# Patient Record
Sex: FEMALE | Race: WHITE | Employment: OTHER | URBAN - METROPOLITAN AREA
[De-identification: names, ages, dates, MRNs, and addresses within clinical notes are randomized per-mention and may not be internally consistent; named-entity substitution may affect disease eponyms.]

---

## 2017-11-30 PROBLEM — M17.11 PRIMARY OSTEOARTHRITIS OF RIGHT KNEE: Status: ACTIVE | Noted: 2017-11-30

## 2019-06-24 ENCOUNTER — OFFICE VISIT (OUTPATIENT)
Dept: OTOLARYNGOLOGY | Facility: CLINIC | Age: 59
End: 2019-06-24
Payer: COMMERCIAL

## 2019-06-24 VITALS
RESPIRATION RATE: 20 BRPM | HEART RATE: 86 BPM | DIASTOLIC BLOOD PRESSURE: 76 MMHG | TEMPERATURE: 97 F | SYSTOLIC BLOOD PRESSURE: 118 MMHG

## 2019-06-24 DIAGNOSIS — R51.9 FACIAL PAIN: ICD-10-CM

## 2019-06-24 DIAGNOSIS — H92.02 REFERRED OTALGIA OF LEFT EAR: Primary | ICD-10-CM

## 2019-06-24 PROCEDURE — 99244 OFF/OP CNSLTJ NEW/EST MOD 40: CPT | Performed by: OTOLARYNGOLOGY

## 2019-06-24 PROCEDURE — 99212 OFFICE O/P EST SF 10 MIN: CPT | Performed by: OTOLARYNGOLOGY

## 2019-06-24 NOTE — PROGRESS NOTES
Tanja Khoury is a 60year old female. Patient presents with:  Ear Pain: Patient present with left ear pain x 2 weeks      HISTORY OF PRESENT ILLNESS  6/24/2019  Patient   with sharp shooting pain in the left ear.   This occurs when she eats cold ice crea member of club or organization: Not on file        Attends meetings of clubs or organizations: Not on file        Relationship status: Not on file      Intimate partner violence:        Fear of current or ex partner: Not on file        Emotionally abused: Exam Normal  normal to inspection   Psychiatric Normal   Appropriate mood and affect.    Lymph Detail Normal  no lymphadenopathy  Minimal neck tension and range of motion    Eyes Normal Conjunctiva - Right: Normal, Left: Normal. Pupil - Right: Normal, Left: tablet, Rfl: 0    ASSESSMENT AND PLAN  1. Referred otalgia of left ear  I believe she has odontogenic pain ordered a CAT scan of the sinuses per her request  - CT SINUS (CPT=70486); Future    2.  Facial pain       Ella Gr MD    6/24/2019    3:17 PM

## 2019-06-30 ENCOUNTER — HOSPITAL ENCOUNTER (OUTPATIENT)
Dept: CT IMAGING | Facility: HOSPITAL | Age: 59
Discharge: HOME | End: 2019-06-30
Attending: OTOLARYNGOLOGY
Payer: COMMERCIAL

## 2019-06-30 DIAGNOSIS — H92.02 REFERRED OTALGIA OF LEFT EAR: ICD-10-CM

## 2019-06-30 PROCEDURE — 70486 CT MAXILLOFACIAL W/O DYE: CPT | Performed by: OTOLARYNGOLOGY

## 2020-02-05 ENCOUNTER — OFFICE VISIT (OUTPATIENT)
Dept: NEUROLOGY | Facility: CLINIC | Age: 60
End: 2020-02-05
Payer: COMMERCIAL

## 2020-02-05 ENCOUNTER — TELEPHONE (OUTPATIENT)
Dept: NEUROLOGY | Facility: CLINIC | Age: 60
End: 2020-02-05

## 2020-02-05 VITALS
SYSTOLIC BLOOD PRESSURE: 126 MMHG | WEIGHT: 205 LBS | HEIGHT: 61 IN | DIASTOLIC BLOOD PRESSURE: 74 MMHG | BODY MASS INDEX: 38.71 KG/M2

## 2020-02-05 DIAGNOSIS — R20.2 PARESTHESIA: ICD-10-CM

## 2020-02-05 DIAGNOSIS — G43.009 MIGRAINE WITHOUT AURA AND WITHOUT STATUS MIGRAINOSUS, NOT INTRACTABLE: ICD-10-CM

## 2020-02-05 DIAGNOSIS — R27.0 ATAXIA: Primary | ICD-10-CM

## 2020-02-05 PROCEDURE — 99204 OFFICE O/P NEW MOD 45 MIN: CPT | Performed by: OTHER

## 2020-02-05 RX ORDER — GABAPENTIN 100 MG/1
CAPSULE ORAL
Qty: 90 CAPSULE | Refills: 3 | Status: SHIPPED | OUTPATIENT
Start: 2020-02-05 | End: 2020-03-04

## 2020-02-05 NOTE — PROGRESS NOTES
Neurology Initial Visit     Referred By: Dr. Gutiérrez ref. provider found    Chief Complaint: Patient presents with:  Neuropathy: Patient presents today c/o numbness in bilateral legs, started a couple years ago.  She states that it goes from right thigh down t that showed some degenerative changes but relatively mild in nature. She was taking tramadol, diclofenac and some other pain medications on a regular basis.     For migraines she was most recently prescribed ajovy, and with that and Maxalt she was able to •  TraMADol HCl (ULTRAM) 50 MG Oral Tab, Take 1 tablet (50 mg total) by mouth every 6 (six) hours as needed for Pain., Disp: 50 tablet, Rfl: 0  •  Triamterene-HCTZ 37.5-25 MG Oral Cap, Take 1 capsule by mouth as needed.   , Disp: , Rfl: 0    No Known Alexandro intact    Gait:  Limping right side gait, possibly antalgic    Labs:    No results found for: TSH  No results found for: HDL, LDL, TRIG  No results found for: HGB, HCT, MCV, WBC, ALC, PLT   No results found for: GLUCOSE, BUN, CREAT, CA, ALT, AST, ALKPHOS, given. All questions were answered. All side effects of drugs were discussed. Return to clinic in: Return in about 4 weeks (around 3/4/2020).     Keyona Salas MD

## 2020-02-05 NOTE — TELEPHONE ENCOUNTER
Called [facility]  for authorization of approval for MRI SPINE THORACIC CPT Code 64438,   MRI SPINE CERVICAL CPT Code 82460, and MRI BRAIN CPT Code 37966-Drqed to Faith Barahona. who states authorization is required from [facility]. Call is transferred to [facility].    Reference # 1-

## 2020-02-06 ENCOUNTER — MED REC SCAN ONLY (OUTPATIENT)
Dept: NEUROLOGY | Facility: CLINIC | Age: 60
End: 2020-02-06

## 2020-02-06 ENCOUNTER — LAB ENCOUNTER (OUTPATIENT)
Dept: LAB | Facility: HOSPITAL | Age: 60
End: 2020-02-06
Attending: Other
Payer: COMMERCIAL

## 2020-02-06 DIAGNOSIS — R20.2 PARESTHESIA: ICD-10-CM

## 2020-02-06 LAB
BASOPHILS # BLD AUTO: 0.07 X10(3) UL (ref 0–0.2)
BASOPHILS NFR BLD AUTO: 1.1 %
DEPRECATED RDW RBC AUTO: 43.7 FL (ref 35.1–46.3)
EOSINOPHIL # BLD AUTO: 0.18 X10(3) UL (ref 0–0.7)
EOSINOPHIL NFR BLD AUTO: 2.7 %
ERYTHROCYTE [DISTWIDTH] IN BLOOD BY AUTOMATED COUNT: 13.4 % (ref 11–15)
HCT VFR BLD AUTO: 39.8 % (ref 35–48)
HCYS SERPL-SCNC: 11.1 UMOL/L (ref 3.2–10.7)
HGB BLD-MCNC: 12.7 G/DL (ref 12–16)
IGA SERPL-MCNC: 132 MG/DL (ref 70–312)
IMM GRANULOCYTES # BLD AUTO: 0.02 X10(3) UL (ref 0–1)
IMM GRANULOCYTES NFR BLD: 0.3 %
LYMPHOCYTES # BLD AUTO: 1.82 X10(3) UL (ref 1–4)
LYMPHOCYTES NFR BLD AUTO: 27.5 %
MCH RBC QN AUTO: 28.1 PG (ref 26–34)
MCHC RBC AUTO-ENTMCNC: 31.9 G/DL (ref 31–37)
MCV RBC AUTO: 88.1 FL (ref 80–100)
MONOCYTES # BLD AUTO: 0.66 X10(3) UL (ref 0.1–1)
MONOCYTES NFR BLD AUTO: 10 %
NEUTROPHILS # BLD AUTO: 3.88 X10 (3) UL (ref 1.5–7.7)
NEUTROPHILS # BLD AUTO: 3.88 X10(3) UL (ref 1.5–7.7)
NEUTROPHILS NFR BLD AUTO: 58.4 %
PLATELET # BLD AUTO: 259 10(3)UL (ref 150–450)
RBC # BLD AUTO: 4.52 X10(6)UL (ref 3.8–5.3)
RHEUMATOID FACT SERPL-ACNC: <10 IU/ML (ref ?–15)
VIT B12 SERPL-MCNC: 846 PG/ML (ref 193–986)
WBC # BLD AUTO: 6.6 X10(3) UL (ref 4–11)

## 2020-02-06 PROCEDURE — 82607 VITAMIN B-12: CPT | Performed by: OTHER

## 2020-02-06 PROCEDURE — 86431 RHEUMATOID FACTOR QUANT: CPT | Performed by: OTHER

## 2020-02-06 PROCEDURE — 86147 CARDIOLIPIN ANTIBODY EA IG: CPT | Performed by: OTHER

## 2020-02-06 PROCEDURE — 83516 IMMUNOASSAY NONANTIBODY: CPT

## 2020-02-06 PROCEDURE — 86039 ANTINUCLEAR ANTIBODIES (ANA): CPT

## 2020-02-06 PROCEDURE — 36415 COLL VENOUS BLD VENIPUNCTURE: CPT

## 2020-02-06 PROCEDURE — 82784 ASSAY IGA/IGD/IGG/IGM EACH: CPT

## 2020-02-06 PROCEDURE — 86255 FLUORESCENT ANTIBODY SCREEN: CPT

## 2020-02-06 PROCEDURE — 86617 LYME DISEASE ANTIBODY: CPT

## 2020-02-06 PROCEDURE — 85025 COMPLETE CBC W/AUTO DIFF WBC: CPT | Performed by: OTHER

## 2020-02-06 PROCEDURE — 86235 NUCLEAR ANTIGEN ANTIBODY: CPT

## 2020-02-06 PROCEDURE — 83876 ASSAY MYELOPEROXIDASE: CPT

## 2020-02-06 PROCEDURE — 86618 LYME DISEASE ANTIBODY: CPT

## 2020-02-06 PROCEDURE — 86225 DNA ANTIBODY NATIVE: CPT

## 2020-02-06 PROCEDURE — 86021 WBC ANTIBODY IDENTIFICATION: CPT

## 2020-02-06 PROCEDURE — 86256 FLUORESCENT ANTIBODY TITER: CPT

## 2020-02-06 PROCEDURE — 83090 ASSAY OF HOMOCYSTEINE: CPT

## 2020-02-06 PROCEDURE — 86038 ANTINUCLEAR ANTIBODIES: CPT

## 2020-02-06 PROCEDURE — 86334 IMMUNOFIX E-PHORESIS SERUM: CPT

## 2020-02-06 NOTE — TELEPHONE ENCOUNTER
Called  [facility] for status authorization for approval  for MRI SPINE THORACIC CPT Code 25042   and MRI SPINE CERVICAL CPT Code 68377. Spoke to clinical nurse Ameena Graf. Who states request above are authorized.    Authorization # [id number] Effective date: 02/05/2

## 2020-02-07 LAB
GLIADIN IGA SER-ACNC: 18 U/ML (ref ?–7)
GLIADIN IGG SER-ACNC: 0.5 U/ML (ref ?–7)
TTG IGA SER-ACNC: 0.2 U/ML (ref ?–7)

## 2020-02-08 LAB
CARDIOLIPIN ANTIBODY, IGA: 0 APL
MYELOPEROX ANTIBODIES, IGG: 0 AU/ML
SERINE PROTEASE3, IGG: 5 AU/ML

## 2020-02-10 ENCOUNTER — TELEPHONE (OUTPATIENT)
Dept: NEUROLOGY | Facility: CLINIC | Age: 60
End: 2020-02-10

## 2020-02-10 DIAGNOSIS — R20.2 PARESTHESIA: Primary | ICD-10-CM

## 2020-02-10 LAB
NEUTROPHIL ASSOCIATED AB: NEGATIVE
NUCLEAR IGG TITR SER IF: POSITIVE {TITER}

## 2020-02-10 NOTE — TELEPHONE ENCOUNTER
I do not think was specifically suggesting any EMG. However it is reasonable to do EMG of both lower extremities if she wants to, it could be scheduled with anyone.

## 2020-02-11 ENCOUNTER — TELEPHONE (OUTPATIENT)
Dept: NEUROLOGY | Facility: CLINIC | Age: 60
End: 2020-02-11

## 2020-02-11 DIAGNOSIS — K90.0 CELIAC DISEASE: Primary | ICD-10-CM

## 2020-02-11 LAB
B. BURGDORFERI AB, IGM BY WB: NEGATIVE
B. BURGDORFERI, IGG WB: POSITIVE
DSDNA AB TITR SER: <10 {TITER}
ENA SM IGG SER QL: NEGATIVE
ENA SM+RNP AB SER QL: NEGATIVE
ENA SS-A AB SER QL IA: NEGATIVE
ENA SS-B AB SER QL IA: NEGATIVE

## 2020-02-11 NOTE — TELEPHONE ENCOUNTER
Left message of patient's test results(hipaa verified).       Also left contact information for YUNIOR KNOX. Dr. Brianna Nazario at [phone]    Order placed

## 2020-02-11 NOTE — TELEPHONE ENCOUNTER
----- Message from Armenta Canavan, MD sent at 2/11/2020  2:54 PM CST -----  Spoke with the patient about some abnormalities that found on her testing, she will need to be seen by gastroenterologist to look into the possibility of celiac disease.     Wo

## 2020-02-12 LAB — ANA NUCLEOLAR TITR SER IF: 160 {TITER}

## 2020-02-13 LAB
CARDIOLIPIN IGG SERPL-ACNC: 4.7 GPL (ref 0–14.9)
CARDIOLIPIN IGM SERPL-ACNC: 15.7 MPL (ref 0–12.4)

## 2020-02-14 ENCOUNTER — TELEPHONE (OUTPATIENT)
Dept: NEUROLOGY | Facility: CLINIC | Age: 60
End: 2020-02-14

## 2020-02-14 NOTE — TELEPHONE ENCOUNTER
Spoke to patient and gave name and number for ID physician, Dr. Benito Louise. She was understanding and thankful for the call.

## 2020-02-14 NOTE — TELEPHONE ENCOUNTER
Spoke with the patient about positive Lyme disease antibody IgG. She will be referred to the ID doctor.

## 2020-02-18 ENCOUNTER — HOSPITAL ENCOUNTER (OUTPATIENT)
Dept: MRI IMAGING | Age: 60
Discharge: HOME | End: 2020-02-18
Attending: Other
Payer: COMMERCIAL

## 2020-02-18 ENCOUNTER — TELEPHONE (OUTPATIENT)
Dept: NEUROLOGY | Facility: CLINIC | Age: 60
End: 2020-02-18

## 2020-02-18 DIAGNOSIS — R20.2 PARESTHESIA: ICD-10-CM

## 2020-02-18 DIAGNOSIS — R27.0 ATAXIA: ICD-10-CM

## 2020-02-18 PROCEDURE — 72141 MRI NECK SPINE W/O DYE: CPT | Performed by: OTHER

## 2020-02-18 PROCEDURE — 72146 MRI CHEST SPINE W/O DYE: CPT | Performed by: OTHER

## 2020-02-18 PROCEDURE — 70551 MRI BRAIN STEM W/O DYE: CPT | Performed by: OTHER

## 2020-02-18 NOTE — TELEPHONE ENCOUNTER
----- Message from Riya MD Jessica sent at 2/18/2020  2:15 PM CST -----  Please let patient know that MRI brain didn't show significant abnormalities.

## 2020-02-18 NOTE — TELEPHONE ENCOUNTER
----- Message from Hao Best MD sent at 2/18/2020  2:17 PM CST -----  Please let patient know that MRI cervical and thoracic spine showed some mild degenerative disc disease, however unlikely to account for her balance problems.

## 2020-02-21 ENCOUNTER — TELEPHONE (OUTPATIENT)
Dept: NEUROLOGY | Facility: CLINIC | Age: 60
End: 2020-02-21

## 2020-02-21 NOTE — TELEPHONE ENCOUNTER
Esther Braun from [facility] is calling in regards to Lime disease form. If  was able to fill out .  please advise [phone]

## 2020-02-21 NOTE — TELEPHONE ENCOUNTER
Called and spoke with Chelo Arce from [facility].     She would like to know the status of the form that was faxed to .    [phone]    Spoke with  and stated it was to be given to ID at the hospital to complete

## 2020-02-24 NOTE — TELEPHONE ENCOUNTER
Spoke to Reagan Cross and notified her that Dr. Holly Barrera referred patient to ID and was not able to answer questions on forms. Informed Reagan Cross of the symptoms patient presented with. She will reach out to patient to get ID physician information.

## 2020-02-26 NOTE — TELEPHONE ENCOUNTER
Pt calling stating that labs need to be sent to [facility], Dr Sigifredo Farrell at [phone]

## 2020-02-27 ENCOUNTER — PROCEDURE VISIT (OUTPATIENT)
Dept: NEUROLOGY | Facility: CLINIC | Age: 60
End: 2020-02-27
Payer: COMMERCIAL

## 2020-02-27 VITALS — BODY MASS INDEX: 41.54 KG/M2 | HEIGHT: 61 IN | WEIGHT: 220 LBS

## 2020-02-27 PROCEDURE — 95886 MUSC TEST DONE W/N TEST COMP: CPT | Performed by: OTHER

## 2020-02-27 PROCEDURE — 95910 NRV CNDJ TEST 7-8 STUDIES: CPT | Performed by: OTHER

## 2020-02-27 NOTE — PROCEDURES
HISTORY:    Severo Alaniz is a 63year old female with a complaint of numbness in her feet. PHYSICAL:    Please refer to the clinic note. TECHNICAL SUMMARY:    There were no significant technical difficulty encountered during this study.   Nerve con R Deep peroneal (Fibular) - EDB      Ankle EDB 3.54 3.6 6.20 Ankle - EDB 8        Ref. ?6.20 ?2.0  Ref. Fib Head EDB 10.36 2.8 6.41 Fib Head - Ankle 30 6.82 44      Ref.      Ref.   ?39      Knee EDB 12.14 3.2 6.56 Knee - Fib Head 10 1.77 56      R Rosa Maria Snell MD

## 2020-03-04 ENCOUNTER — OFFICE VISIT (OUTPATIENT)
Dept: NEUROLOGY | Facility: CLINIC | Age: 60
End: 2020-03-04
Payer: COMMERCIAL

## 2020-03-04 VITALS
BODY MASS INDEX: 41.54 KG/M2 | RESPIRATION RATE: 16 BRPM | DIASTOLIC BLOOD PRESSURE: 80 MMHG | HEART RATE: 64 BPM | SYSTOLIC BLOOD PRESSURE: 124 MMHG | HEIGHT: 61 IN | WEIGHT: 220 LBS

## 2020-03-04 DIAGNOSIS — R20.2 PARESTHESIA: ICD-10-CM

## 2020-03-04 DIAGNOSIS — R27.0 ATAXIA: ICD-10-CM

## 2020-03-04 DIAGNOSIS — G43.009 MIGRAINE WITHOUT AURA AND WITHOUT STATUS MIGRAINOSUS, NOT INTRACTABLE: Primary | ICD-10-CM

## 2020-03-04 PROCEDURE — 99214 OFFICE O/P EST MOD 30 MIN: CPT | Performed by: OTHER

## 2020-03-04 RX ORDER — GABAPENTIN 100 MG/1
CAPSULE ORAL
Qty: 90 CAPSULE | Refills: 3 | Status: SHIPPED | OUTPATIENT
Start: 2020-03-04 | End: 2020-05-07

## 2020-03-04 NOTE — PROGRESS NOTES
Neurology follow-up visit     Referred By: Dr. Gutiérrez ref. provider found    Chief Complaint: Patient presents with:  Numbness: Patient here for follow up for neuropathy to BLE.  States since LOV on 2/5/2020 patient has noticed approx 25% improvement with the 2 migraines she was most recently prescribed ajovy, and with that and Maxalt she was able to manage her migraines much better, she was getting them maybe twice a month and able to abort them very quickly with Maxalt.    - MRI BRAIN (CPT=70551);  Future  - MRI unknown: Yes         Current Outpatient Medications:   •  gabapentin 100 MG Oral Cap, 2 pills 3 times a day for 1 week, then 3 pills 3 times a day, Disp: 90 capsule, Rfl: 3  •  Fremanezumab-vfrm (AJOVY) 225 MG/1.5ML Subcutaneous Solution Prefilled Syringe, repetition, vocabulary    Cranial Nerves:    VII. Face symmetric, no facial weakness  VIII. Hearing intact to whisper. IX. Pallet elevates symmetrically. XI. Shoulder shrug is intact  XII.  Tongue is midline    Motor Exam:  Muscle tone normal  No atrophy

## 2020-03-31 ENCOUNTER — TELEPHONE (OUTPATIENT)
Dept: GASTROENTEROLOGY | Facility: CLINIC | Age: 60
End: 2020-03-31

## 2020-03-31 NOTE — TELEPHONE ENCOUNTER
Please inform patient that I would be happy to give referral for advanced pain management as I should not be the one to manage her chronic back pain. There are new restrictions in place with controlled substances and that it is more appropriate for specialists to manage these medications.    Spoke to Kevin, we rescheduled her OV due to COVID-19.     Future Appointments   Date Time Provider Kelly Saldaña   4/23/2020 10:30 AM Marcelino Blanton MD [facility], [place], operated by [facility] Thong ROBLES blister

## 2020-04-23 ENCOUNTER — TELEMEDICINE (OUTPATIENT)
Dept: GASTROENTEROLOGY | Facility: CLINIC | Age: 60
End: 2020-04-23

## 2020-04-23 ENCOUNTER — TELEPHONE (OUTPATIENT)
Dept: GASTROENTEROLOGY | Facility: CLINIC | Age: 60
End: 2020-04-23

## 2020-04-23 DIAGNOSIS — R89.4 ABNORMAL CELIAC ANTIBODY PANEL: Primary | ICD-10-CM

## 2020-04-23 DIAGNOSIS — Z12.11 SCREENING FOR COLORECTAL CANCER: ICD-10-CM

## 2020-04-23 DIAGNOSIS — Z80.0 FAMILY HISTORY OF COLON CANCER: ICD-10-CM

## 2020-04-23 DIAGNOSIS — Z12.12 SCREENING FOR COLORECTAL CANCER: ICD-10-CM

## 2020-04-23 PROCEDURE — 99243 OFF/OP CNSLTJ NEW/EST LOW 30: CPT | Performed by: INTERNAL MEDICINE

## 2020-04-23 NOTE — PATIENT INSTRUCTIONS
1. Advise upper endoscopy to evaluate the abnormal antibody test and episodic vomiting. 2.  We will obtain a copy of your most recent colonoscopy report.

## 2020-04-23 NOTE — TELEPHONE ENCOUNTER
Electronic request e-sent to Dr. Myla Ortiz at [phone] to obtain copy of last colonoscopy and pathology report as per Dr. Dulce Alcala request.     -Awaiting medical records at this time.

## 2020-04-23 NOTE — PROGRESS NOTES
HPI:    Patient ID: Darryn Duggan is a 63year old female.     HPI    Review of Systems         Current Outpatient Medications   Medication Sig Dispense Refill   • gabapentin 100 MG Oral Cap 2 pills 3 times a day for 1 week, then 3 pills 3 times a day 90

## 2020-04-23 NOTE — TELEPHONE ENCOUNTER
The office of Dr. Tigre Mills is requesting a signed release from the patient. I mailed a release form for patient to sign and return to her home address.

## 2020-04-23 NOTE — PROGRESS NOTES
[facility] Amb Video Visit    The patient verbally consents to an ambulatory video visit and understands and accepts financial responsibility for any deductible, co-insurance and/or co-pays associated with this service.     This visit is conducted using Telemedici months, course to be completed in 10 days  Diclofenac 75 mg daily    Social History:  Non-smoker  The patient is of [facility] descent    Family history:   Mother was diagnosed with colon cancer at the age of 80      Medications (Active prior to today's visit) above    Physical exam:  General: patient is awake, cooperative, no acute distress.   She appears well  HEENT: EOMI, no scleral icterus  Resp: non-labored breathing  Neuro: alert and interactive - oriented to person, time and place   Psych: calm, cooperativ this encounter       Imaging & Referrals:  None     Please note that this visit was completed using two-way, real-time interactive audio and/or video communication.   This has been done in good jessenia to provide continuity of care in the best interest of the

## 2020-04-24 NOTE — TELEPHONE ENCOUNTER
GI schedulers-    Please schedule patient for EGD as per Dr. Brown Townsend virtual visit orders from 4/23/2020 in June, thank you.

## 2020-04-24 NOTE — TELEPHONE ENCOUNTER
Dr. Sloane Sheehan-    Pt contacted and reviewed message below, she verbalized understanding of all and is agreeable to scheduling EGD.      Please advise on timeframe of when pt may be scheduled, pt is OK if she is scheduled in June/July d/t COVID-19 pandemic if need

## 2020-04-24 NOTE — TELEPHONE ENCOUNTER
Please inform the patient that I have reviewed her previous colonoscopy report which was performed in August 2018. The colonoscopy was normal with a good preparation. I would not repeat the patient's colonoscopy at this time.   I would proceed with upper

## 2020-04-27 ENCOUNTER — TELEPHONE (OUTPATIENT)
Dept: NEUROLOGY | Facility: CLINIC | Age: 60
End: 2020-04-27

## 2020-04-27 NOTE — TELEPHONE ENCOUNTER
Scheduled for:  EGD with small bowel bx 69331  Provider Name:  Dr. Ingrid Sabillon  Date:  6/9/20  Location:    [facility]  Sedation:  MAC  Time:  3903 (pt is aware to arrive at 0815)   Prep:  NPO after midnight, sent instructions via [employer]  Meds/Allergies Reconci

## 2020-04-30 NOTE — TELEPHONE ENCOUNTER
I spoke with this patient to give her Dr. Catrachita Jules reply. She verbally understood and will be at the procedure at 0815.

## 2020-05-07 RX ORDER — GABAPENTIN 100 MG/1
300 CAPSULE ORAL 3 TIMES DAILY
Qty: 270 CAPSULE | Refills: 1 | Status: SHIPPED | OUTPATIENT
Start: 2020-05-07 | End: 2020-06-19

## 2020-05-28 ENCOUNTER — OFFICE VISIT (OUTPATIENT)
Dept: GASTROENTEROLOGY | Facility: CLINIC | Age: 60
End: 2020-05-28
Payer: COMMERCIAL

## 2020-05-28 VITALS
WEIGHT: 229.63 LBS | HEIGHT: 61 IN | SYSTOLIC BLOOD PRESSURE: 95 MMHG | DIASTOLIC BLOOD PRESSURE: 64 MMHG | BODY MASS INDEX: 43.35 KG/M2 | HEART RATE: 90 BPM

## 2020-05-28 DIAGNOSIS — R11.11 NON-INTRACTABLE VOMITING WITHOUT NAUSEA, UNSPECIFIED VOMITING TYPE: ICD-10-CM

## 2020-05-28 DIAGNOSIS — R89.4 ABNORMAL CELIAC ANTIBODY PANEL: Primary | ICD-10-CM

## 2020-05-28 PROCEDURE — 99213 OFFICE O/P EST LOW 20 MIN: CPT | Performed by: INTERNAL MEDICINE

## 2020-05-28 RX ORDER — DESLORATADINE 5 MG/1
1 TABLET ORAL DAILY
COMMUNITY
Start: 2010-07-22 | End: 2022-01-10

## 2020-05-28 RX ORDER — OMEPRAZOLE 20 MG/1
20 CAPSULE, DELAYED RELEASE ORAL DAILY
COMMUNITY
Start: 2020-04-02 | End: 2022-01-10

## 2020-05-28 RX ORDER — IBUPROFEN 800 MG/1
800 TABLET ORAL 2 TIMES DAILY PRN
COMMUNITY
Start: 2020-05-21 | End: 2022-01-10

## 2020-05-28 RX ORDER — AZELASTINE HCL 205.5 UG/1
1 SPRAY NASAL 2 TIMES DAILY
COMMUNITY
Start: 2020-05-19 | End: 2022-01-10

## 2020-05-28 RX ORDER — LEVOCETIRIZINE DIHYDROCHLORIDE 5 MG/1
5 TABLET, FILM COATED ORAL ONCE AS NEEDED
COMMUNITY

## 2020-05-28 RX ORDER — CHLORHEXIDINE GLUCONATE 0.12 MG/ML
30 RINSE ORAL DAILY
COMMUNITY
Start: 2020-05-19 | End: 2022-01-10

## 2020-05-29 ENCOUNTER — TELEPHONE (OUTPATIENT)
Dept: GASTROENTEROLOGY | Facility: CLINIC | Age: 60
End: 2020-05-29

## 2020-05-29 NOTE — TELEPHONE ENCOUNTER
GI RN staff: Please contact the patient's surgeon Dr. Dumont Friend office at [phone] to obtain a copy of the patient's gastric surgery operative report and pathology report.

## 2020-05-29 NOTE — PATIENT INSTRUCTIONS
1. Proceed with endoscopy as scheduled. 2.  Follow-up with neurology. 3.  I will obtain records from the stomach surgery in 2013.

## 2020-05-29 NOTE — PROGRESS NOTES
HPI:    Patient ID: Tammy Nunes is a 63year old female. HPI  The patient returns in follow-up today to a telemedicine visit on 4/23/2020. She is scheduled for an upper endoscopy on June 9.     As per previous notes, the patient has had joint and allie kg)  02/18/20 : 220 lb (99.8 kg)  02/05/20 : 205 lb (93 kg)           Current Outpatient Medications   Medication Sig Dispense Refill   • Chlorhexidine Gluconate 0.12 % Mouth/Throat Solution Use as directed 30 mL in the mouth or throat daily.      • Tab Triplett WHEEZING  Egg                     NAUSEA AND VOMITING, SHORTNESS OF                            BREATH, WHEEZING  Iodine (Topical)        PALPITATIONS  Shrimp                  ANAPHYLAXIS, TONGUE SWELLING  Cefuroxime              RASH   PHYSICAL EXAM:   P antibody panel was negative. We discussed that this is likely a false positive, however, endoscopy would be advised to obtain a small bowel biopsy and exclude histologic evidence of celiac disease.   The stomach will also be investigated in light of the pa

## 2020-06-03 NOTE — TELEPHONE ENCOUNTER
The patient had surgery performed in 2013. Can you confirm with the patient that she had the procedure performed by Dr. Tamar Guadalupe or his office? If not does she recall the surgeon's name?

## 2020-06-03 NOTE — TELEPHONE ENCOUNTER
Patient contacted and confirmed  her surgery was performed on July 1st 2013 by Dr. Josephine Hernandez.

## 2020-06-04 NOTE — TELEPHONE ENCOUNTER
NIK Pienda spoke to [facility] in Dr Jaime Suarez office below [phone]. She states in 2013 it may have been done at [facility], and  no longer goes there. She will look for surgical records and fax to me--she has my direct line if problems.    Sta

## 2020-06-05 NOTE — TELEPHONE ENCOUNTER
Noted.  I have reviewed records from [facility] via [facility]. I do not see pathology reports from a gastric resection.

## 2020-06-08 ENCOUNTER — LAB ENCOUNTER (OUTPATIENT)
Dept: LAB | Facility: HOSPITAL | Age: 60
End: 2020-06-08
Attending: INTERNAL MEDICINE
Payer: COMMERCIAL

## 2020-06-08 DIAGNOSIS — Z01.818 PRE-OP TESTING: ICD-10-CM

## 2020-06-08 DIAGNOSIS — R89.4 ABNORMAL CELIAC ANTIBODY PANEL: ICD-10-CM

## 2020-06-08 NOTE — TELEPHONE ENCOUNTER
Records reviewed and sent to scanning. There was a less than 1 cm solid nodule involving the serosal surface of the fundus of the stomach which was excised with a stapling device. A vertical banded gastroplasty was performed.   Pathology, however, was not

## 2020-06-09 ENCOUNTER — HOSPITAL ENCOUNTER (OUTPATIENT)
Facility: HOSPITAL | Age: 60
Setting detail: HOSPITAL OUTPATIENT SURGERY
Discharge: HOME | End: 2020-06-09
Attending: INTERNAL MEDICINE | Admitting: INTERNAL MEDICINE
Payer: COMMERCIAL

## 2020-06-09 ENCOUNTER — ANESTHESIA (OUTPATIENT)
Dept: ENDOSCOPY | Facility: HOSPITAL | Age: 60
End: 2020-06-09
Payer: COMMERCIAL

## 2020-06-09 ENCOUNTER — ANESTHESIA EVENT (OUTPATIENT)
Dept: ENDOSCOPY | Facility: HOSPITAL | Age: 60
End: 2020-06-09
Payer: COMMERCIAL

## 2020-06-09 VITALS
TEMPERATURE: 97 F | SYSTOLIC BLOOD PRESSURE: 152 MMHG | BODY MASS INDEX: 42.48 KG/M2 | DIASTOLIC BLOOD PRESSURE: 98 MMHG | WEIGHT: 225 LBS | HEART RATE: 69 BPM | HEIGHT: 61 IN | RESPIRATION RATE: 16 BRPM | OXYGEN SATURATION: 100 %

## 2020-06-09 DIAGNOSIS — R89.4 ABNORMAL CELIAC ANTIBODY PANEL: Primary | ICD-10-CM

## 2020-06-09 DIAGNOSIS — Z01.818 PRE-OP TESTING: ICD-10-CM

## 2020-06-09 PROCEDURE — 0DB98ZX EXCISION OF DUODENUM, VIA NATURAL OR ARTIFICIAL OPENING ENDOSCOPIC, DIAGNOSTIC: ICD-10-PCS | Performed by: INTERNAL MEDICINE

## 2020-06-09 PROCEDURE — 0DB38ZX EXCISION OF LOWER ESOPHAGUS, VIA NATURAL OR ARTIFICIAL OPENING ENDOSCOPIC, DIAGNOSTIC: ICD-10-PCS | Performed by: INTERNAL MEDICINE

## 2020-06-09 PROCEDURE — 0DB68ZX EXCISION OF STOMACH, VIA NATURAL OR ARTIFICIAL OPENING ENDOSCOPIC, DIAGNOSTIC: ICD-10-PCS | Performed by: INTERNAL MEDICINE

## 2020-06-09 PROCEDURE — 43239 EGD BIOPSY SINGLE/MULTIPLE: CPT | Performed by: INTERNAL MEDICINE

## 2020-06-09 RX ORDER — LIDOCAINE HYDROCHLORIDE 10 MG/ML
INJECTION, SOLUTION EPIDURAL; INFILTRATION; INTRACAUDAL; PERINEURAL AS NEEDED
Status: DISCONTINUED | OUTPATIENT
Start: 2020-06-09 | End: 2020-06-09 | Stop reason: SURG

## 2020-06-09 RX ORDER — NALOXONE HYDROCHLORIDE 0.4 MG/ML
80 INJECTION, SOLUTION INTRAMUSCULAR; INTRAVENOUS; SUBCUTANEOUS AS NEEDED
Status: CANCELLED | OUTPATIENT
Start: 2020-06-09 | End: 2020-06-09

## 2020-06-09 RX ORDER — SODIUM CHLORIDE, SODIUM LACTATE, POTASSIUM CHLORIDE, CALCIUM CHLORIDE 600; 310; 30; 20 MG/100ML; MG/100ML; MG/100ML; MG/100ML
INJECTION, SOLUTION INTRAVENOUS CONTINUOUS
Status: CANCELLED | OUTPATIENT
Start: 2020-06-09

## 2020-06-09 RX ORDER — SODIUM CHLORIDE, SODIUM LACTATE, POTASSIUM CHLORIDE, CALCIUM CHLORIDE 600; 310; 30; 20 MG/100ML; MG/100ML; MG/100ML; MG/100ML
INJECTION, SOLUTION INTRAVENOUS CONTINUOUS
Status: DISCONTINUED | OUTPATIENT
Start: 2020-06-09 | End: 2020-06-09

## 2020-06-09 RX ADMIN — LIDOCAINE HYDROCHLORIDE 50 MG: 10 INJECTION, SOLUTION EPIDURAL; INFILTRATION; INTRACAUDAL; PERINEURAL at 09:36:00

## 2020-06-09 NOTE — ANESTHESIA PREPROCEDURE EVALUATION
Anesthesia PreOp Note    HPI:     Celine Pitts is a 63year old female who presents for preoperative consultation requested by: Gisele Stoll MD    Date of Surgery: 6/9/2020    Procedure(s):  ESOPHAGOGASTRODUODENOSCOPY (EGD)  Indication: Will Paul 6/8/2020  ALPRAZolam 0.25 MG Oral Tab, Take 0.25 mg by mouth as needed. , Disp: , Rfl: 1, 6/2/2020  PROAIR  (90 Base) MCG/ACT Inhalation Aero Soln, USE 1-2 PUFFS EVERY 4-6 HOURS AS NEEDED FOR SHORTNESS OF BREATH.  AND 15-30 MIN BEFORE STRENOUS ACTIV, Highest education level: Not on file    Occupational History      Not on file    Social Needs      Financial resource strain: Not on file      Food insecurity:        Worry: Not on file        Inability: Not on file      Transportation needs:        Keyon Riddles auscultation  (+) sleep apnea,   Cardiovascular - normal exam  Exercise tolerance: poor  (+) hypertension well controlled,     Rhythm: regular    Neuro/Psych      GI/Hepatic/Renal    (+) GERD poorly controlled,     Endo/Other    (+) arthritis    Comments:

## 2020-06-09 NOTE — H&P
History & Physical Examination    Patient Name: Vida Diaz  MRN: [id number]  CSN: [id number]  YOB: 1960    Diagnosis: Abnormal sprue serology      ibuprofen 800 MG Oral Tab, Take 800 mg by mouth 2 (two) times daily as needed. , Disp: , Rf needed. , Disp: , Rfl: , 5/26/2020  Esomeprazole Magnesium 40 MG Oral Capsule Delayed Release, Take 40 mg by mouth daily. , Disp: , Rfl: 4, Taking  Triamterene-HCTZ 37.5-25 MG Oral Cap, Take 1 capsule by mouth as needed.   , Disp: , Rfl: 0,  at PRN      lacta

## 2020-06-09 NOTE — ANESTHESIA POSTPROCEDURE EVALUATION
Patient: Harjinder Francisco    Procedure Summary     Date:  06/09/20 Room / Location:  [facility] ENDOSCOPY 04 / [facility] ENDOSCOPY    Anesthesia Start:  4511 Anesthesia Stop:  0293    Procedure:  ESOPHAGOGASTRODUODENOSCOPY (EGD) (N/A ) Diagnosis:       Abnormal celiac a

## 2020-06-09 NOTE — OPERATIVE REPORT
[facility] Endoscopy Report      Date of Procedure:  06/09/20        Preoperative Diagnosis:  1. Abnormal sprue serology  2. Episodic vomiting post vertical banded gastroplasty  3.   History of laparoscopic excision of a gastric fundal ser location of the band was difficult to identify as fluid has been removed prior to the endoscopy. The stomach distended appropriately with insufflated air.    There were staples present focally in the gastric fundus at the site of prior stromal tumor excisi

## 2020-06-10 NOTE — TELEPHONE ENCOUNTER
I spoke to Dr. Len Johnson. We reviewed the endoscopic findings (Candida esophagitis). Duodenal biopsies were negative for sprue. The patient had a small stromal tumor removed at the time of lap band placement.   We discussed proceeding with a follow-up CT s

## 2020-06-10 NOTE — TELEPHONE ENCOUNTER
I spoke with Dr. Kohler Prior at [phone]  and he states there was no pathology sent. Would like to speak with you directly regarding recent  EGD results. Please call him  back on cell # [phone]. Thank you.

## 2020-06-11 ENCOUNTER — TELEPHONE (OUTPATIENT)
Dept: GASTROENTEROLOGY | Facility: CLINIC | Age: 60
End: 2020-06-11

## 2020-06-12 DIAGNOSIS — Z85.09 HISTORY OF GASTROINTESTINAL STROMAL TUMOR (GIST): Primary | ICD-10-CM

## 2020-06-12 RX ORDER — FLUCONAZOLE 100 MG/1
100 TABLET ORAL DAILY
Qty: 10 TABLET | Refills: 0 | Status: SHIPPED | OUTPATIENT
Start: 2020-06-12 | End: 2020-08-10

## 2020-06-19 ENCOUNTER — HOSPITAL ENCOUNTER (OUTPATIENT)
Dept: CT IMAGING | Facility: HOSPITAL | Age: 60
Discharge: HOME | End: 2020-06-19
Attending: INTERNAL MEDICINE
Payer: COMMERCIAL

## 2020-06-19 ENCOUNTER — OFFICE VISIT (OUTPATIENT)
Dept: NEUROLOGY | Facility: CLINIC | Age: 60
End: 2020-06-19
Payer: COMMERCIAL

## 2020-06-19 VITALS
WEIGHT: 225 LBS | BODY MASS INDEX: 42.48 KG/M2 | DIASTOLIC BLOOD PRESSURE: 70 MMHG | HEIGHT: 61 IN | HEART RATE: 72 BPM | RESPIRATION RATE: 16 BRPM | SYSTOLIC BLOOD PRESSURE: 130 MMHG

## 2020-06-19 DIAGNOSIS — R20.2 PARESTHESIA: ICD-10-CM

## 2020-06-19 DIAGNOSIS — R27.0 ATAXIA: ICD-10-CM

## 2020-06-19 DIAGNOSIS — K90.0 CELIAC DISEASE: ICD-10-CM

## 2020-06-19 DIAGNOSIS — G43.009 MIGRAINE WITHOUT AURA AND WITHOUT STATUS MIGRAINOSUS, NOT INTRACTABLE: Primary | ICD-10-CM

## 2020-06-19 DIAGNOSIS — M54.12 CERVICAL RADICULOPATHY: ICD-10-CM

## 2020-06-19 DIAGNOSIS — Z85.09 HISTORY OF GASTROINTESTINAL STROMAL TUMOR (GIST): ICD-10-CM

## 2020-06-19 PROCEDURE — 99214 OFFICE O/P EST MOD 30 MIN: CPT | Performed by: OTHER

## 2020-06-19 PROCEDURE — 82565 ASSAY OF CREATININE: CPT

## 2020-06-19 PROCEDURE — 74160 CT ABDOMEN W/CONTRAST: CPT | Performed by: INTERNAL MEDICINE

## 2020-06-19 RX ORDER — GABAPENTIN 400 MG/1
400 CAPSULE ORAL 3 TIMES DAILY
Qty: 270 CAPSULE | Refills: 3 | Status: SHIPPED | OUTPATIENT
Start: 2020-06-19 | End: 2020-08-10

## 2020-06-19 NOTE — PROGRESS NOTES
Neurology follow-up visit     Referred By: Dr. Gutiérrez ref. provider found    Chief Complaint: Patient presents with:  Numbness      HPI:     Navneet Calles is a 63year old female, who presents for trouble with walking, pain, headaches.   Apparently patient JENNY,DIRECT,REFLEX TITER + SPECIFIC ANTIBODIES; Future  - ANTICARDIOLIPIN AB, IGA, QN  - ANTICARDIOLIPIN AB, IGG, QN  - ANTICARDIOLIPIN AB, IGM, QN  - CBC WITH DIFFERENTIAL WITH PLATELET  - IMMUNOFIXATION [ Nubia ;  Future  - HOMOCYSTEINE; Future  - MPO/AZ-3 A SURGERY           Social history:    Smoking status: Never Smoker   Smokeless tobacco: Never Used       Alcohol use Yes   Comment: rare       Drug use: No       Family History   Family history unknown: Yes         Current Outpatient Medications:   •  fluco BREATH.  AND 15-30 MIN BEFORE STRENOUS ACTIV, Disp: , Rfl: 2  •  EPINEPHrine 0.3 MG/0.3ML Injection Solution Auto-injector, Inject 0.3 mg into the muscle., Disp: , Rfl:   •  TraMADol HCl (ULTRAM) 50 MG Oral Tab, Take 1 tablet (50 mg total) by mouth every 6 02/06/2020      No results found for: GLUCOSE, BUN, CREAT, CA, ALT, AST, ALKPHOS, ALB, NA, K, CL, CO2   I have reviewed labs. Imaging Studies:  I have independently reviewed imaging.   I have reviewed the EMG tracings independently, as above    MRI of th

## 2020-07-10 ENCOUNTER — TELEPHONE (OUTPATIENT)
Dept: NEUROLOGY | Facility: CLINIC | Age: 60
End: 2020-07-10

## 2020-07-13 ENCOUNTER — OFFICE VISIT (OUTPATIENT)
Dept: PHYSICAL THERAPY | Age: 60
End: 2020-07-13
Attending: PODIATRIST
Payer: COMMERCIAL

## 2020-07-13 DIAGNOSIS — M54.12 CERVICAL RADICULOPATHY: ICD-10-CM

## 2020-07-13 DIAGNOSIS — R20.2 PARESTHESIA: ICD-10-CM

## 2020-07-13 PROCEDURE — 97110 THERAPEUTIC EXERCISES: CPT

## 2020-07-13 PROCEDURE — 97161 PT EVAL LOW COMPLEX 20 MIN: CPT

## 2020-07-13 NOTE — PROGRESS NOTES
LUMBAR SPINE EVALUATION:   Referring Physician: Dr. Lisa David  Diagnosis: Paresthesia (R20.2)  Cervical radiculopathy (M54.12) Date of Service: 7/13/2020     PATIENT SUMMARY   Severo Alaniz is a 63year old y/o female who presents to therapy today wit CPAP PRESCRIBED - PT DOES NOT USE D/T CLAUSTROPHOBIA           ASSESSMENT:   Sarahi presents to physical therapy evaluation with primary c/o L shoulder pain that radiates into L upper arm.  She will occasionally get tingling into her fingertips however thi sideglide mobility throughout     Palpation: moderate restrictions at c/s paraspinals with reported tenderness; moderate restrictions at L pecs  Sensation: intact to light touch  Special Tests:   Cervical Compression: NE  Cervical Distraction: better    To covid    Patient was advised of these findings, precautions, and treatment options and has agreed to actively participate in planning and for this course of care.     Thank you for your referral. Please co-sign or sign and return this letter via fax as soon

## 2020-07-15 ENCOUNTER — OFFICE VISIT (OUTPATIENT)
Dept: PHYSICAL THERAPY | Age: 60
End: 2020-07-15
Attending: Other
Payer: COMMERCIAL

## 2020-07-15 PROCEDURE — 97110 THERAPEUTIC EXERCISES: CPT

## 2020-07-15 PROCEDURE — 97140 MANUAL THERAPY 1/> REGIONS: CPT

## 2020-07-15 NOTE — PROGRESS NOTES
Diagnosis: Paresthesia (R20.2)  Cervical radiculopathy (M54.12)  Insurance (Authorized # of Visits):  [employer] PPO (8 per POC)      Authorizing Physician: Dr. Uribe  Next MD visit: none scheduled  Fall Risk: standard         Precautions: n/a           Canc TherEx x2, Man x1       Total Timed Treatment: 40 min  Total Treatment Time: 40 min

## 2020-07-20 ENCOUNTER — OFFICE VISIT (OUTPATIENT)
Dept: PHYSICAL THERAPY | Age: 60
End: 2020-07-20
Attending: Other
Payer: COMMERCIAL

## 2020-07-20 DIAGNOSIS — M54.12 CERVICAL RADICULOPATHY: ICD-10-CM

## 2020-07-20 DIAGNOSIS — R20.2 PARESTHESIA: ICD-10-CM

## 2020-07-20 PROCEDURE — 97140 MANUAL THERAPY 1/> REGIONS: CPT

## 2020-07-20 PROCEDURE — 97110 THERAPEUTIC EXERCISES: CPT

## 2020-07-20 NOTE — PROGRESS NOTES
Diagnosis: Paresthesia (R20.2)  Cervical radiculopathy (M54.12)  Insurance (Authorized # of Visits):  [employer] PPO (8 per POC)      Authorizing Physician: Dr. Domitila Cooper MD visit: none scheduled  Fall Risk: standard         Precautions: n/a           Canc as indicated. Progress scapular stability with focus on decreasing cervical compensation    Goals:    1. Pt will verbalize and demo compliance with HEP at least 75% of the time to allow for independent management of symptoms at discharge - in progress  2.

## 2020-07-22 ENCOUNTER — OFFICE VISIT (OUTPATIENT)
Dept: PHYSICAL THERAPY | Age: 60
End: 2020-07-22
Attending: Other
Payer: COMMERCIAL

## 2020-07-22 DIAGNOSIS — R20.2 PARESTHESIA: ICD-10-CM

## 2020-07-22 DIAGNOSIS — M54.12 CERVICAL RADICULOPATHY: ICD-10-CM

## 2020-07-22 PROCEDURE — 97140 MANUAL THERAPY 1/> REGIONS: CPT

## 2020-07-22 PROCEDURE — 97110 THERAPEUTIC EXERCISES: CPT

## 2020-07-22 NOTE — PROGRESS NOTES
Diagnosis: Paresthesia (R20.2)  Cervical radiculopathy (M54.12)  Insurance (Authorized # of Visits):  [employer] PPO (8 per POC)      Authorizing Physician: Dr. Amy Harrison  Next MD visit: none scheduled  Fall Risk: standard         Precautions: n/a           Canc release/STM B post RTC and scapulothoracic mms Supine: STM cervical paraspinals, cervical manual traction    Supine: STM L post RTC  Supine occipital release   Neuromuscular Re-education   Educated sitting posture with lumbar roll and supine with towel rol

## 2020-07-27 ENCOUNTER — APPOINTMENT (OUTPATIENT)
Dept: PHYSICAL THERAPY | Age: 60
End: 2020-07-27
Attending: Other
Payer: COMMERCIAL

## 2020-07-29 ENCOUNTER — TELEPHONE (OUTPATIENT)
Dept: PHYSICAL THERAPY | Age: 60
End: 2020-07-29

## 2020-07-29 ENCOUNTER — APPOINTMENT (OUTPATIENT)
Dept: PHYSICAL THERAPY | Age: 60
End: 2020-07-29
Attending: Other
Payer: COMMERCIAL

## 2020-08-04 ENCOUNTER — OFFICE VISIT (OUTPATIENT)
Dept: PHYSICAL THERAPY | Age: 60
End: 2020-08-04
Attending: Other
Payer: COMMERCIAL

## 2020-08-04 DIAGNOSIS — R20.2 PARESTHESIA: ICD-10-CM

## 2020-08-04 DIAGNOSIS — M54.12 CERVICAL RADICULOPATHY: ICD-10-CM

## 2020-08-04 PROCEDURE — 97140 MANUAL THERAPY 1/> REGIONS: CPT

## 2020-08-04 PROCEDURE — 97110 THERAPEUTIC EXERCISES: CPT

## 2020-08-04 NOTE — PROGRESS NOTES
Diagnosis: Paresthesia (R20.2)  Cervical radiculopathy (M54.12)  Insurance (Authorized # of Visits):  [employer] PPO (8 per POC)      Authorizing Physician: Dr. Christopher Moe  Next MD visit: none scheduled  Fall Risk: standard         Precautions: n/a           Canc hold  -Seated C-ret with OP x 10 (c/o pulling in back)  -Seated scap squeeze 15 x5 sec hold  -standing high/low rows with BTT  x20 ea with 5 sec hold  -Standing ext with BTT x20  -Standing fwd punch with BTT x20  -Standing self release of post RTC and scap work - in progress  4. Pt will demo normalized c/s segmental mobility to  neural tension contributing to radicular symptoms. - in progress      Charges:  TherEx x1, Man x2       Total Timed Treatment: 42 min  Total Treatment Time: 45 min

## 2020-08-06 ENCOUNTER — OFFICE VISIT (OUTPATIENT)
Dept: PHYSICAL THERAPY | Age: 60
End: 2020-08-06
Attending: Other
Payer: COMMERCIAL

## 2020-08-06 DIAGNOSIS — M54.12 CERVICAL RADICULOPATHY: ICD-10-CM

## 2020-08-06 DIAGNOSIS — R20.2 PARESTHESIA: ICD-10-CM

## 2020-08-06 PROCEDURE — 97110 THERAPEUTIC EXERCISES: CPT

## 2020-08-06 PROCEDURE — 97140 MANUAL THERAPY 1/> REGIONS: CPT

## 2020-08-06 NOTE — PROGRESS NOTES
ProgressSummary  Pt has attended 6 visits in Physical Therapy.      Diagnosis: Paresthesia (R20.2)  Cervical radiculopathy (M54.12)  Insurance (Authorized # of Visits):  [facility] PPO (8 per POC)      Authorizing Physician: Dr. Gino Garcia  Next MD visit: none s mechanics     ROM:      Cervical         Pain (+/-)   Flexion 60/60     Extension 70/70     R Sidebend 45/45     L sidebend 40/45     R Rotation 75/80     L Rotation 75/80        Shoulder AROM: WNL all directions.     EXTREMITY STRENGTH:   -/5     Right Le contact me at Dept: [phone]. Sincerely,  Electronically signed by therapist: Tashia Joseph, PT ,DPT,SB-C2    Physician's certification required:  Yes  Please co-sign or sign and return this letter via fax as soon as possible to [phone].    I doorway pec stretch [id number]   Manual Therapy Supine cervical manual traction, STM cervical paraspinals, cervical sideglide    Seated TP release UTs B Supine: STM cervical paraspinals, cervical manual traction    Supine: GHJ mobilizations B    Supine: STM

## 2020-08-07 ENCOUNTER — OFFICE VISIT (OUTPATIENT)
Dept: OTOLARYNGOLOGY | Facility: CLINIC | Age: 60
End: 2020-08-07
Payer: COMMERCIAL

## 2020-08-07 VITALS
BODY MASS INDEX: 43 KG/M2 | SYSTOLIC BLOOD PRESSURE: 108 MMHG | HEART RATE: 80 BPM | DIASTOLIC BLOOD PRESSURE: 75 MMHG | WEIGHT: 225 LBS

## 2020-08-07 DIAGNOSIS — H92.01 RIGHT EAR PAIN: Primary | ICD-10-CM

## 2020-08-07 PROCEDURE — 99213 OFFICE O/P EST LOW 20 MIN: CPT | Performed by: OTOLARYNGOLOGY

## 2020-08-07 PROCEDURE — 3078F DIAST BP <80 MM HG: CPT | Performed by: OTOLARYNGOLOGY

## 2020-08-07 PROCEDURE — 3074F SYST BP LT 130 MM HG: CPT | Performed by: OTOLARYNGOLOGY

## 2020-08-07 RX ORDER — CELECOXIB 200 MG/1
200 CAPSULE ORAL DAILY PRN
Qty: 30 CAPSULE | Refills: 0 | Status: SHIPPED | OUTPATIENT
Start: 2020-08-07 | End: 2020-09-06

## 2020-08-07 NOTE — PROGRESS NOTES
Ashwini Dias is a 63year old female.   Patient presents with:  Ear Problem: right ear, pt having drainage-crusting, pt states had dental work on that side and since than she has been having ear pain ,       HISTORY OF PRESENT ILLNESS    She presents wit Cardio Negative Chest pain, irregular heartbeat/palpitations and syncope. GI Negative Abdominal pain and diarrhea. Endocrine Negative Cold intolerance and heat intolerance. Neuro Negative Tremors. Psych Negative Anxiety and depression.    Integume mouth 3 (three) times daily. , Disp: 270 capsule, Rfl: 3  •  Chlorhexidine Gluconate 0.12 % Mouth/Throat Solution, Use as directed 30 mL in the mouth or throat daily. , Disp: , Rfl:   •  Desloratadine (CLARINEX) 5 MG Oral Tab, Take 1 tablet by mouth daily. , Take 20 mg by mouth daily. , Disp: , Rfl:   ASSESSMENT AND PLAN    1. Right ear pain  Appears to be musculoskeletal in nature. More likely related to her recent dental work. Start Celebrex warm heat soft diet and chewing both sides of mouth.   Return to se

## 2020-08-10 ENCOUNTER — OFFICE VISIT (OUTPATIENT)
Dept: NEUROLOGY | Facility: CLINIC | Age: 60
End: 2020-08-10
Payer: COMMERCIAL

## 2020-08-10 VITALS — WEIGHT: 225 LBS | BODY MASS INDEX: 42.48 KG/M2 | HEIGHT: 61 IN

## 2020-08-10 DIAGNOSIS — R27.0 ATAXIA: ICD-10-CM

## 2020-08-10 DIAGNOSIS — M54.12 CERVICAL RADICULOPATHY: ICD-10-CM

## 2020-08-10 DIAGNOSIS — G43.009 MIGRAINE WITHOUT AURA AND WITHOUT STATUS MIGRAINOSUS, NOT INTRACTABLE: Primary | ICD-10-CM

## 2020-08-10 DIAGNOSIS — R20.2 PARESTHESIA: ICD-10-CM

## 2020-08-10 PROCEDURE — 3008F BODY MASS INDEX DOCD: CPT | Performed by: OTHER

## 2020-08-10 PROCEDURE — 99214 OFFICE O/P EST MOD 30 MIN: CPT | Performed by: OTHER

## 2020-08-10 RX ORDER — GABAPENTIN 300 MG/1
300 CAPSULE ORAL 3 TIMES DAILY
COMMUNITY
End: 2020-08-10

## 2020-08-10 RX ORDER — RIZATRIPTAN BENZOATE 10 MG
10 TABLET ORAL DAILY
Qty: 9 TABLET | Refills: 5 | Status: SHIPPED | OUTPATIENT
Start: 2020-08-10 | End: 2021-06-23

## 2020-08-10 RX ORDER — GABAPENTIN 300 MG/1
300 CAPSULE ORAL 3 TIMES DAILY
Qty: 270 CAPSULE | Refills: 3 | Status: SHIPPED | OUTPATIENT
Start: 2020-08-10 | End: 2022-01-10

## 2020-08-10 NOTE — PROGRESS NOTES
Neurology follow-up visit     Referred By: Dr. Gutiérrez ref. provider found    Chief Complaint: Patient presents with:  Neurologic Problem: LOV: 6/19/20. F/U on migraine and neuropathy.  Patient states that she has been doing PT and feels it has given some reli basis.     For migraines she was most recently prescribed Ajovy her gynecologist, and with that and Maxalt she was able to manage her migraines much better, she was getting them maybe twice a month and able to abort them very quickly with Maxalt.    - MRI B Diagnosis Date   • High blood pressure    • PONV (postoperative nausea and vomiting)    • Sleep apnea     CPAP PRESCRIBED - PT DOES NOT USE D/T CLAUSTROPHOBIA       Past Surgical History:   Procedure Laterality Date   • CARPAL TUNNEL RELEASE Bilateral capsule by mouth as needed. , Disp: , Rfl: 0  •  MAXALT 10 MG Oral Tab, Take 10 mg by mouth daily. , Disp: , Rfl: 1  •  Montelukast Sodium 10 MG Oral Tab, Take 10 mg by mouth daily. , Disp: , Rfl: 2  •  Diclofenac Sodium 75 MG Oral Tab EC, Take 75 mg by susan Pallet elevates symmetrically. XI. Shoulder shrug is intact  XII.  Tongue is midline    Motor Exam:  Muscle tone normal  No atrophy or fasciculations  Strength- upper extremities 5/5 proximally and distally  Rapid alternating movements intact    Gait:  Ospina to patient. Instructed patient to call my office or seek medical attention immediately if symptoms worsen. Patient verbalized understanding of information given. All questions were answered. All side effects of drugs were discussed.      Return to clinic i

## 2020-08-12 ENCOUNTER — OFFICE VISIT (OUTPATIENT)
Dept: PHYSICAL THERAPY | Age: 60
End: 2020-08-12
Attending: FAMILY MEDICINE
Payer: COMMERCIAL

## 2020-08-12 PROCEDURE — 97140 MANUAL THERAPY 1/> REGIONS: CPT

## 2020-08-12 PROCEDURE — 97110 THERAPEUTIC EXERCISES: CPT

## 2020-08-12 NOTE — PROGRESS NOTES
Diagnosis: Paresthesia (R20.2)  Cervical radiculopathy (M54.12)  Insurance (Authorized # of Visits):  BCBS PPO (8 per POC)      Authorizing Physician: Dr. Julio Camp  Next MD visit: none scheduled  Fall Risk: standard         Precautions: n/a           Canc with ext x10 (c/o feel stretching) -Seated C-ret x10 with 5 sec hold  -Seated C-ret with OP x 10 (c/o pulling in back)  -Seated scap squeeze 15 x5 sec hold  -standing high/low rows with BTT  x20 ea with 5 sec hold  -Standing ext with BTT x20  -Standing fwd 8/6: Instructed pt on focus on following exercises: sidely thoracic rotations, rows GTB, low rows GTB, self release with tennis ball    Plan: Continued extension based C-spine and T-spine exercises, posture education, mid back and scapular strengthening

## 2020-08-19 ENCOUNTER — APPOINTMENT (OUTPATIENT)
Dept: PHYSICAL THERAPY | Age: 60
End: 2020-08-19
Attending: FAMILY MEDICINE
Payer: COMMERCIAL

## 2020-08-20 ENCOUNTER — OFFICE VISIT (OUTPATIENT)
Dept: NEUROLOGY | Facility: CLINIC | Age: 60
End: 2020-08-20
Payer: COMMERCIAL

## 2020-08-20 VITALS — HEIGHT: 61 IN | WEIGHT: 225 LBS | BODY MASS INDEX: 42.48 KG/M2

## 2020-08-20 DIAGNOSIS — M54.12 CERVICAL RADICULOPATHY: Primary | ICD-10-CM

## 2020-08-20 DIAGNOSIS — M25.561 CHRONIC PAIN OF RIGHT KNEE: ICD-10-CM

## 2020-08-20 DIAGNOSIS — G89.29 CHRONIC PAIN OF RIGHT KNEE: ICD-10-CM

## 2020-08-20 DIAGNOSIS — R20.2 PARESTHESIA: ICD-10-CM

## 2020-08-20 PROCEDURE — 99244 OFF/OP CNSLTJ NEW/EST MOD 40: CPT | Performed by: PHYSICAL MEDICINE & REHABILITATION

## 2020-08-20 PROCEDURE — 3008F BODY MASS INDEX DOCD: CPT | Performed by: PHYSICAL MEDICINE & REHABILITATION

## 2020-08-20 RX ORDER — ACETAMINOPHEN AND CODEINE PHOSPHATE 300; 30 MG/1; MG/1
1 TABLET ORAL 2 TIMES DAILY PRN
Qty: 60 TABLET | Refills: 0 | Status: SHIPPED | OUTPATIENT
Start: 2020-08-20 | End: 2020-09-24

## 2020-08-20 RX ORDER — DULOXETIN HYDROCHLORIDE 30 MG/1
30 CAPSULE, DELAYED RELEASE ORAL DAILY
Qty: 30 CAPSULE | Refills: 0 | Status: SHIPPED | OUTPATIENT
Start: 2020-08-20 | End: 2020-09-14

## 2020-08-20 NOTE — H&P
Mame Israel 36    History and Physical    Jj Medico Patient Status:  No patient class for patient encounter    1960 MRN [id number]   Location [place] [address] Attending No att. providers found   Hosp Day # 0 movements of the left shoulder. She has no history of previous neck or shoulder problems in the past.  She has done physical therapy, but the symptoms persist.    The patient also has a approximately 2-year history of right lateral knee numbness.   After u ANAPHYLAXIS, HIVES, RASH, SHORTNESS                           OF BREATH, Tightness in Chest,                            WHEEZING  Egg                     NAUSEA AND VOMITING, SHORTNESS OF                            BREATH, WHEEZING  Iodine (Topical) motion:   Forward flexion: 160 degrees  Abduction: 160 degrees  Internal rotation: symmetric, T9  External rotation: no restriction to passive ROM    Provocative test:  Supraspinatus test: negative  Hawkin's test: negative  Neer's test: negative    right kn is mild-moderate central canal narrowing. C5-C6:  Broad-based disc bulge/osteophyte complex which contacts the cord without cord deformity or signal change. There is moderate central canal narrowing.   C6-C7:  Broad-based disc bulge/osteophyte complex cau jax.     Nehemias Gannon DO  8/20/2020

## 2020-08-24 ENCOUNTER — OFFICE VISIT (OUTPATIENT)
Dept: PHYSICAL THERAPY | Age: 60
End: 2020-08-24
Attending: Other
Payer: COMMERCIAL

## 2020-08-24 PROCEDURE — 97110 THERAPEUTIC EXERCISES: CPT

## 2020-08-24 PROCEDURE — 97140 MANUAL THERAPY 1/> REGIONS: CPT

## 2020-08-24 NOTE — PROGRESS NOTES
Diagnosis: Paresthesia (R20.2)  Cervical radiculopathy (M54.12)  Insurance (Authorized # of Visits):  BCBS PPO (8 per POC)      Authorizing Physician: Dr. Betty Shankar  Next MD visit: none scheduled  Fall Risk: standard         Precautions: n/a           Canc x5 sec hold  -standing high/low rows with BTT  x20 ea with 5 sec hold  -Standing ext with BTT x20  -Standing fwd punch with BTT x20  -Standing self release of post RTC and scapulothoracic mms with tennis ball at wall x10  -Wall push u x10  -Wall hori abd w roll and supine with towel roll under pillow     Educated sitting posture with lumbar roll and supine with towel roll under pillow       Current HEP:   7/13: supine DNF, doorway pec stretch, scapular retractions - handout issued  7/15: standing rows and lo

## 2020-08-31 ENCOUNTER — TELEPHONE (OUTPATIENT)
Dept: PHYSICAL THERAPY | Age: 60
End: 2020-08-31

## 2020-09-01 ENCOUNTER — OFFICE VISIT (OUTPATIENT)
Dept: PHYSICAL THERAPY | Age: 60
End: 2020-09-01
Attending: Other
Payer: COMMERCIAL

## 2020-09-01 PROCEDURE — 97110 THERAPEUTIC EXERCISES: CPT

## 2020-09-01 PROCEDURE — 97140 MANUAL THERAPY 1/> REGIONS: CPT

## 2020-09-01 NOTE — PROGRESS NOTES
Diagnosis: Paresthesia (R20.2)  Cervical radiculopathy (M54.12)  Insurance (Authorized # of Visits):  BCBS PPO (8 per POC)      Authorizing Physician: Dr. Gino Garcia  Next MD visit: none scheduled  Fall Risk: standard         Precautions: n/a           Canc rot with OP x10  -Seated C-ret with L rot with OP x10  -Seated scap squeeze 15 x5 sec hold  -S/L open book x10  -Prone thoracic ext x10  -Prone thoracic ext left rot x10  -Supine LTR x10    -Standing high/low rows with BTT  x20 ea with 5 sec hold -Scifit L and T    Plan: Continued extension based C-spine and T-spine exercises, posture education, mid back and scapular strengthening exercises. Charges:  TherEx x2, Man x1       Total Timed Treatment: 43 min  Total Treatment Time: 45 min

## 2020-09-03 ENCOUNTER — APPOINTMENT (OUTPATIENT)
Dept: PHYSICAL THERAPY | Age: 60
End: 2020-09-03
Attending: Other
Payer: COMMERCIAL

## 2020-09-09 ENCOUNTER — OFFICE VISIT (OUTPATIENT)
Dept: PHYSICAL THERAPY | Age: 60
End: 2020-09-09
Attending: Other
Payer: COMMERCIAL

## 2020-09-09 PROCEDURE — 97140 MANUAL THERAPY 1/> REGIONS: CPT

## 2020-09-09 PROCEDURE — 97110 THERAPEUTIC EXERCISES: CPT

## 2020-09-09 NOTE — PROGRESS NOTES
Neeraj  Pt has attended 10 visits in Physical Therapy.      Diagnosis: Paresthesia (R20.2)  Cervical radiculopathy (M54.12)  Insurance (Authorized # of Visits):  [facility] PPO (8 per POC)      Authorizing Physician: Dr. Nita Cooper MD visit: none improved L shoulder and scapular strength to at least 4/5 for improved stability with reaching and lifting/carrying -Partially MET (Reaching c/o pain, able to carrying 2#)  3.  Pt will demo proper mechanics for bending/lifting to decrease risk for re-injury BTB x20  -Prone scap squeeze 10 x3 sec hold  -Prone ext/rows x10  -Prone T x10 -Scifit L1 x3 min back/fwd  -Doorway stretch 10 x5 sec hold  -Wall slide x10  -wall slide with YTB x10  -Wall hori abd with YTB x5 (c/o pain)  -Wall shoulder daignol flex and ab

## 2020-09-13 DIAGNOSIS — M54.12 CERVICAL RADICULOPATHY: ICD-10-CM

## 2020-09-13 DIAGNOSIS — R20.2 PARESTHESIA: ICD-10-CM

## 2020-09-14 RX ORDER — DULOXETIN HYDROCHLORIDE 30 MG/1
CAPSULE, DELAYED RELEASE ORAL
Qty: 30 CAPSULE | Refills: 0 | Status: SHIPPED | OUTPATIENT
Start: 2020-09-14 | End: 2020-09-24

## 2020-09-14 NOTE — TELEPHONE ENCOUNTER
Medication request: Duloxetine 30mg daily    LOV 8/20/20  NOV 9/24/20    Last refill: 8/20/20    Per Dr. Socorro Fried at LOV-Recommend a trial of duloxetine 30 mg daily; uptitrate next visit if no relief.   She was instructed to stop the tramadol to avoid intera

## 2020-09-24 ENCOUNTER — OFFICE VISIT (OUTPATIENT)
Dept: NEUROLOGY | Facility: CLINIC | Age: 60
End: 2020-09-24
Payer: COMMERCIAL

## 2020-09-24 VITALS — HEIGHT: 61 IN | WEIGHT: 224 LBS | BODY MASS INDEX: 42.29 KG/M2

## 2020-09-24 DIAGNOSIS — R20.2 PARESTHESIA: ICD-10-CM

## 2020-09-24 DIAGNOSIS — M25.561 CHRONIC PAIN OF RIGHT KNEE: Primary | ICD-10-CM

## 2020-09-24 DIAGNOSIS — M54.12 CERVICAL RADICULOPATHY: ICD-10-CM

## 2020-09-24 DIAGNOSIS — G89.29 CHRONIC PAIN OF RIGHT KNEE: Primary | ICD-10-CM

## 2020-09-24 PROCEDURE — 99214 OFFICE O/P EST MOD 30 MIN: CPT | Performed by: PHYSICAL MEDICINE & REHABILITATION

## 2020-09-24 PROCEDURE — 3008F BODY MASS INDEX DOCD: CPT | Performed by: PHYSICAL MEDICINE & REHABILITATION

## 2020-09-24 RX ORDER — ACETAMINOPHEN AND CODEINE PHOSPHATE 300; 30 MG/1; MG/1
1 TABLET ORAL 2 TIMES DAILY PRN
Qty: 60 TABLET | Refills: 0 | Status: SHIPPED | OUTPATIENT
Start: 2020-09-24 | End: 2020-11-03

## 2020-09-24 RX ORDER — DULOXETIN HYDROCHLORIDE 60 MG/1
60 CAPSULE, DELAYED RELEASE ORAL DAILY
Qty: 30 CAPSULE | Refills: 0 | Status: SHIPPED | OUTPATIENT
Start: 2020-09-24 | End: 2020-10-19

## 2020-09-24 NOTE — PROGRESS NOTES
HPI:    Patient ID: Ayo Lawson is a 63year old female. 63year old female presents for follow-up. She has completed physical therapy earlier this month.   She continues to have episodic stabbing pain in the left arm radiating to the lateral aspect Solution Use as directed 30 mL in the mouth or throat daily. • Desloratadine (CLARINEX) 5 MG Oral Tab Take 1 tablet by mouth daily. • Azelastine HCl 0.15 % Nasal Solution 1 spray by Nasal route 2 (two) times daily.      • hydrocortisone 2.5 % Extern motion: 40 degrees with cervical extension. 50 degrees with cervical flexion. 75 degrees with right cervical rotation. 75 degrees with left cervical rotation. Palpation: ttp left upper trapezius; no ttp left cervical paraspinals.     Provocative maneuver

## 2020-09-24 NOTE — PATIENT INSTRUCTIONS
If you find yourself to be overly tired with your current dose of duloxetine and gabapentin, then I would recommend weaning off the gabapentin as follows:    1. Take the gabapentin in the morning and then at nighttime for 1 week, then  2.   Take the gabape

## 2020-10-08 ENCOUNTER — MED REC SCAN ONLY (OUTPATIENT)
Dept: NEUROLOGY | Facility: CLINIC | Age: 60
End: 2020-10-08

## 2020-10-13 DIAGNOSIS — M54.12 CERVICAL RADICULOPATHY: ICD-10-CM

## 2020-10-13 DIAGNOSIS — R20.2 PARESTHESIA: ICD-10-CM

## 2020-10-13 RX ORDER — DULOXETIN HYDROCHLORIDE 30 MG/1
CAPSULE, DELAYED RELEASE ORAL
Qty: 30 CAPSULE | Refills: 0 | OUTPATIENT
Start: 2020-10-13

## 2020-10-13 NOTE — TELEPHONE ENCOUNTER
Patient was prescribed duloxetine 60 mg on 9/24. Spoke to patient. She does not need a refill on 30 mg. She states that she is taking the 60 mg caps.

## 2020-10-17 DIAGNOSIS — M25.561 CHRONIC PAIN OF RIGHT KNEE: ICD-10-CM

## 2020-10-17 DIAGNOSIS — G89.29 CHRONIC PAIN OF RIGHT KNEE: ICD-10-CM

## 2020-10-17 DIAGNOSIS — M54.12 CERVICAL RADICULOPATHY: ICD-10-CM

## 2020-10-17 DIAGNOSIS — R20.2 PARESTHESIA: ICD-10-CM

## 2020-10-19 RX ORDER — DULOXETIN HYDROCHLORIDE 60 MG/1
CAPSULE, DELAYED RELEASE ORAL
Qty: 30 CAPSULE | Refills: 0 | Status: SHIPPED | OUTPATIENT
Start: 2020-10-19 | End: 2022-01-10

## 2020-10-19 NOTE — TELEPHONE ENCOUNTER
Refill request for duloxetine 60 mg, take 1 cap daily, #30, no refills    LOV: 9/24/20  NOV: none  Last refilled on 9/24/20

## 2020-11-02 ENCOUNTER — TELEPHONE (OUTPATIENT)
Dept: NEUROLOGY | Facility: CLINIC | Age: 60
End: 2020-11-02

## 2020-11-02 DIAGNOSIS — R20.2 PARESTHESIA: ICD-10-CM

## 2020-11-02 DIAGNOSIS — M25.561 CHRONIC PAIN OF RIGHT KNEE: ICD-10-CM

## 2020-11-02 DIAGNOSIS — G89.29 CHRONIC PAIN OF RIGHT KNEE: ICD-10-CM

## 2020-11-02 DIAGNOSIS — M54.12 CERVICAL RADICULOPATHY: ICD-10-CM

## 2020-11-02 DIAGNOSIS — M54.12 CERVICAL RADICULOPATHY: Primary | ICD-10-CM

## 2020-11-02 NOTE — TELEPHONE ENCOUNTER
Spoke to patient. She previously did PT for neck and shoulder which really helped. She would like to try PT again.  She is scheduled for PT in December through [facility] but would like script mailed to her as she will be going closer to home if she is able

## 2020-11-02 NOTE — TELEPHONE ENCOUNTER
Refill request for tylenol #3, BID PRN, #60, no refills    LOV: 9/24/20  NOV: none  [place]

## 2020-11-03 RX ORDER — ACETAMINOPHEN AND CODEINE PHOSPHATE 300; 30 MG/1; MG/1
1 TABLET ORAL 2 TIMES DAILY PRN
Qty: 60 TABLET | Refills: 0 | Status: SHIPPED | OUTPATIENT
Start: 2020-11-03 | End: 2022-01-10

## 2020-12-01 ENCOUNTER — OFFICE VISIT (OUTPATIENT)
Dept: PHYSICAL THERAPY | Age: 60
End: 2020-12-01
Attending: Other
Payer: COMMERCIAL

## 2020-12-01 DIAGNOSIS — R20.2 PARESTHESIA: ICD-10-CM

## 2020-12-01 DIAGNOSIS — M54.12 CERVICAL RADICULOPATHY: ICD-10-CM

## 2020-12-01 PROCEDURE — 97110 THERAPEUTIC EXERCISES: CPT

## 2020-12-01 PROCEDURE — 97161 PT EVAL LOW COMPLEX 20 MIN: CPT

## 2020-12-01 NOTE — PROGRESS NOTES
P.T. EVALUATION:   Referring Physician: Dr. Nita Louie  Diagnosis: Cervical radiculopathy (M54.12)  Paresthesia (R20.2)     Date of Onset: June 2020 Date of Service: 12/1/2020     PATIENT SUMMARY   Nolan Richards is a 63year old y/o female who presents t pain  C4: (+) pain spinous process and left transverse process  C5-T1: (+) pain along spinous processes    Thoracic spine:  PA assessment:   Generalized hypomobility    Strength/MMT:   Shoulder flx: R 5/5, L 4+/5  Shoulder abd: R 5/5, L 4/5  Shoulder ER: R From: 12/1/2020  To:3/1/2021

## 2020-12-03 ENCOUNTER — OFFICE VISIT (OUTPATIENT)
Dept: PHYSICAL THERAPY | Age: 60
End: 2020-12-03
Attending: Other
Payer: COMMERCIAL

## 2020-12-03 PROCEDURE — 97140 MANUAL THERAPY 1/> REGIONS: CPT

## 2020-12-03 PROCEDURE — 97110 THERAPEUTIC EXERCISES: CPT

## 2020-12-03 NOTE — PROGRESS NOTES
Diagnosis: Cervical radiculopathy (M54.12)  Paresthesia (R20.2)    Next MD visit: none scheduled  Fall Risk: standard         Precautions: n/a          Medication Changes since last visit?: No    Subjective: Pt reports no current pain.  Pt reports last nigh

## 2020-12-08 ENCOUNTER — OFFICE VISIT (OUTPATIENT)
Dept: PHYSICAL THERAPY | Age: 60
End: 2020-12-08
Attending: Other
Payer: COMMERCIAL

## 2020-12-08 PROCEDURE — 97110 THERAPEUTIC EXERCISES: CPT

## 2020-12-08 NOTE — PROGRESS NOTES
Diagnosis: Cervical radiculopathy (M54.12)  Paresthesia (R20.2)    Next MD visit: none scheduled  Fall Risk: standard         Precautions: n/a          Medication Changes since last visit?: No    Subjective: Pt reports no current pain.  Pt reports she is st c-distraction/traction on/off, cervical rotation/lateral flx x 12 minutes   Therapeutic Activity    -    Modalities    -                 Assessment: Pt displaying improved cervical ROM with reassessment today.        Plan: continue PT     Charges: Ex 3   To

## 2020-12-10 ENCOUNTER — OFFICE VISIT (OUTPATIENT)
Dept: PHYSICAL THERAPY | Age: 60
End: 2020-12-10
Attending: Other
Payer: COMMERCIAL

## 2020-12-10 PROCEDURE — 97110 THERAPEUTIC EXERCISES: CPT

## 2020-12-10 NOTE — PROGRESS NOTES
Diagnosis: Cervical radiculopathy (M54.12)  Paresthesia (R20.2)    Next MD visit: none scheduled  Fall Risk: standard         Precautions: n/a          Medication Changes since last visit?: No    Subjective: Pt reports 0/10 current pain.  She reports overal over ball 10x  - supine c-retraction with towel under occiput 10x  - supine B chest press 3# 2x10  - supine alternating B shoulder flexion 1# 10x  - supine B shoulder horizontal abd/add 1# 10x  - repeat supine c-retraction with towel under occiput 10x  - s

## 2020-12-15 ENCOUNTER — APPOINTMENT (OUTPATIENT)
Dept: PHYSICAL THERAPY | Age: 60
End: 2020-12-15
Attending: Other
Payer: COMMERCIAL

## 2020-12-16 ENCOUNTER — TELEPHONE (OUTPATIENT)
Dept: PHYSICAL THERAPY | Facility: HOSPITAL | Age: 60
End: 2020-12-16

## 2020-12-17 ENCOUNTER — APPOINTMENT (OUTPATIENT)
Dept: PHYSICAL THERAPY | Age: 60
End: 2020-12-17
Attending: Other
Payer: COMMERCIAL

## 2020-12-22 ENCOUNTER — APPOINTMENT (OUTPATIENT)
Dept: PHYSICAL THERAPY | Age: 60
End: 2020-12-22
Attending: Other
Payer: COMMERCIAL

## 2020-12-24 ENCOUNTER — APPOINTMENT (OUTPATIENT)
Dept: PHYSICAL THERAPY | Age: 60
End: 2020-12-24
Attending: Other
Payer: COMMERCIAL

## 2020-12-31 ENCOUNTER — APPOINTMENT (OUTPATIENT)
Dept: PHYSICAL THERAPY | Age: 60
End: 2020-12-31
Attending: Other
Payer: COMMERCIAL

## 2021-01-05 ENCOUNTER — OFFICE VISIT (OUTPATIENT)
Dept: PHYSICAL THERAPY | Age: 61
End: 2021-01-05
Attending: Other
Payer: COMMERCIAL

## 2021-01-05 PROCEDURE — 97110 THERAPEUTIC EXERCISES: CPT

## 2021-01-05 NOTE — PROGRESS NOTES
Diagnosis: Cervical radiculopathy (M54.12)  Paresthesia (R20.2)    Next MD visit: none scheduled  Fall Risk: standard         Precautions: n/a          Medication Changes since last visit?: No  Subjective: Pt reports she went to [facility] clinic and had an x-ra 2x10  - supine B chest press 3# 2x15  - supine B shoulder flexion 1# 2x10  - supine B shoulder horizontal abd/add 1# 2x10  - sidelying R/L upper trunk rotation 15x ea  - standing B shoulder scap rows with GSC 2x10  - standing B shoulder horizontal abd/add Initiated additional UE exercises this session to address symptoms possibly due to RTC involvement.       Plan: continue PT     Charges: Ex 3   Total Timed Treatment: 40 min  Total Treatment Time: 45 min

## 2021-01-06 ENCOUNTER — TELEPHONE (OUTPATIENT)
Dept: PHYSICAL THERAPY | Facility: HOSPITAL | Age: 61
End: 2021-01-06

## 2021-01-07 ENCOUNTER — APPOINTMENT (OUTPATIENT)
Dept: PHYSICAL THERAPY | Age: 61
End: 2021-01-07
Attending: Other
Payer: COMMERCIAL

## 2021-01-12 ENCOUNTER — OFFICE VISIT (OUTPATIENT)
Dept: PHYSICAL THERAPY | Age: 61
End: 2021-01-12
Attending: Other
Payer: COMMERCIAL

## 2021-01-12 PROCEDURE — 97110 THERAPEUTIC EXERCISES: CPT

## 2021-01-12 NOTE — PROGRESS NOTES
Diagnosis: Cervical radiculopathy (M54.12)  Paresthesia (R20.2)    Next MD visit: none scheduled  Fall Risk: standard         Precautions: n/a          Medication Changes since last visit?: No  Subjective: Pt reports slight pain in her left lateral deltoid with deb   15#-20# 2x10     - seated c-extension with towel 10x  - seated R/L c-rotation 10x ea  - supine c-retraction with towel under occiput 10x 5 sec holds  - supine c-retraction with self OP & towel under occiput 10x  - supine c-retraction with left r wall 2x10 (relief)   Manual Therapy       - supine c-distraction/traction on/off, cervical rotation/lateral flx x 12 minutes   Therapeutic Activity       -    Modalities       -                  Assessment: Session focused on rotator cuff and scapular stre

## 2021-01-19 ENCOUNTER — OFFICE VISIT (OUTPATIENT)
Dept: PHYSICAL THERAPY | Age: 61
End: 2021-01-19
Attending: Other
Payer: COMMERCIAL

## 2021-01-19 PROCEDURE — 97110 THERAPEUTIC EXERCISES: CPT

## 2021-01-19 NOTE — PROGRESS NOTES
Diagnosis: Cervical radiculopathy (M54.12)  Paresthesia (R20.2)    Next MD visit: none scheduled  Fall Risk: standard         Precautions: n/a          Medication Changes since last visit?: No  Subjective: Pt reports intermittent neck pain and B shoulder p standing B shoulder ext with deb   20# 2x10  - standing B shoulder extension 2x10  - standing B scaption 0# 2x10     - reassessment cervical ROM, shoulder ROM  - supine R/L c-rotation 10x ea  - supine B chest press 2-3# 2x10  - supine B shoulder flexion 1# 2x10  - supine alternating B shoulder flexion 1# 10x  - supine B shoulder horizontal abd/add 1# 10x  - repeat supine c-retraction with towel under occiput 10x  - seated c-extension 10x (left neck pain elicited)   - standing B shoulder scap rows with GSC 2x

## 2021-02-02 ENCOUNTER — TELEPHONE (OUTPATIENT)
Dept: PHYSICAL THERAPY | Facility: HOSPITAL | Age: 61
End: 2021-02-02

## 2021-02-03 ENCOUNTER — LAB ENCOUNTER (OUTPATIENT)
Dept: LAB | Age: 61
End: 2021-02-03
Attending: NURSE PRACTITIONER
Payer: COMMERCIAL

## 2021-02-03 ENCOUNTER — OFFICE VISIT (OUTPATIENT)
Dept: PHYSICAL THERAPY | Age: 61
End: 2021-02-03
Attending: Other
Payer: COMMERCIAL

## 2021-02-03 DIAGNOSIS — Z01.818 PREOP EXAMINATION: Primary | ICD-10-CM

## 2021-02-03 LAB
ALBUMIN SERPL-MCNC: 3.7 G/DL (ref 3.4–5)
ALBUMIN/GLOB SERPL: 1 {RATIO} (ref 1–2)
ALP LIVER SERPL-CCNC: 101 U/L
ALT SERPL-CCNC: 24 U/L
ANION GAP SERPL CALC-SCNC: 7 MMOL/L (ref 0–18)
AST SERPL-CCNC: 17 U/L (ref 15–37)
BASOPHILS # BLD AUTO: 0.07 X10(3) UL (ref 0–0.2)
BASOPHILS NFR BLD AUTO: 0.9 %
BILIRUB SERPL-MCNC: 0.6 MG/DL (ref 0.1–2)
BUN BLD-MCNC: 14 MG/DL (ref 7–18)
BUN/CREAT SERPL: 14.6 (ref 10–20)
CALCIUM BLD-MCNC: 9.5 MG/DL (ref 8.5–10.1)
CHLORIDE SERPL-SCNC: 105 MMOL/L (ref 98–112)
CO2 SERPL-SCNC: 25 MMOL/L (ref 21–32)
CREAT BLD-MCNC: 0.96 MG/DL
DEPRECATED RDW RBC AUTO: 45.4 FL (ref 35.1–46.3)
EOSINOPHIL # BLD AUTO: 0.1 X10(3) UL (ref 0–0.7)
EOSINOPHIL NFR BLD AUTO: 1.2 %
ERYTHROCYTE [DISTWIDTH] IN BLOOD BY AUTOMATED COUNT: 14.1 % (ref 11–15)
GLOBULIN PLAS-MCNC: 3.7 G/DL (ref 2.8–4.4)
GLUCOSE BLD-MCNC: 96 MG/DL (ref 70–99)
HCT VFR BLD AUTO: 42.5 %
HGB BLD-MCNC: 13.2 G/DL
IMM GRANULOCYTES # BLD AUTO: 0.02 X10(3) UL (ref 0–1)
IMM GRANULOCYTES NFR BLD: 0.2 %
INR BLD: 1 (ref 0.9–1.2)
LYMPHOCYTES # BLD AUTO: 2.45 X10(3) UL (ref 1–4)
LYMPHOCYTES NFR BLD AUTO: 29.8 %
M PROTEIN MFR SERPL ELPH: 7.4 G/DL (ref 6.4–8.2)
MCH RBC QN AUTO: 27.3 PG (ref 26–34)
MCHC RBC AUTO-ENTMCNC: 31.1 G/DL (ref 31–37)
MCV RBC AUTO: 87.8 FL
MONOCYTES # BLD AUTO: 0.72 X10(3) UL (ref 0.1–1)
MONOCYTES NFR BLD AUTO: 8.8 %
NEUTROPHILS # BLD AUTO: 4.85 X10 (3) UL (ref 1.5–7.7)
NEUTROPHILS # BLD AUTO: 4.85 X10(3) UL (ref 1.5–7.7)
NEUTROPHILS NFR BLD AUTO: 59.1 %
OSMOLALITY SERPL CALC.SUM OF ELEC: 284 MOSM/KG (ref 275–295)
PATIENT FASTING Y/N/NP: YES
PLATELET # BLD AUTO: 220 10(3)UL (ref 150–450)
POTASSIUM SERPL-SCNC: 4.3 MMOL/L (ref 3.5–5.1)
PROTHROMBIN TIME: 13 SECONDS (ref 11.8–14.5)
RBC # BLD AUTO: 4.84 X10(6)UL
SODIUM SERPL-SCNC: 137 MMOL/L (ref 136–145)
WBC # BLD AUTO: 8.2 X10(3) UL (ref 4–11)

## 2021-02-03 PROCEDURE — 36415 COLL VENOUS BLD VENIPUNCTURE: CPT

## 2021-02-03 PROCEDURE — 80053 COMPREHEN METABOLIC PANEL: CPT

## 2021-02-03 PROCEDURE — 85610 PROTHROMBIN TIME: CPT

## 2021-02-03 PROCEDURE — 85025 COMPLETE CBC W/AUTO DIFF WBC: CPT

## 2021-02-03 PROCEDURE — 97110 THERAPEUTIC EXERCISES: CPT

## 2021-02-03 NOTE — PROGRESS NOTES
Diagnosis: Cervical radiculopathy (M54.12)  Paresthesia (R20.2)    Next MD visit: none scheduled  Fall Risk: standard         Precautions: n/a          Medication Changes since last visit?: No  Subjective: Pt reports left upper arm pain rated as 4-5/10 cur shoulder flexion 2# 2x10  - supine B shoulder horizontal abd/add 1-2# 2x10  - supine R/L shoulder ER with YTB above wrists 1x10 ea  - sidelying L shoulder ER 1# 2x10  - standing B shoulder mid scap rows with GSC 2x10 ea  - standing B shoulder ext [facility] 1x10 cervical rotation 10x  - seated R/L c-rotation 10x ea  - supine c-retraction with towel under occiput 10x 5 sec holds  - supine c-retraction with left rotation 10x  - supine B chest press 3# 2x10  - supine B shoulder flexion 1# 2x10  - supine B shoulder ho

## 2021-02-09 ENCOUNTER — OFFICE VISIT (OUTPATIENT)
Dept: PHYSICAL THERAPY | Age: 61
End: 2021-02-09
Attending: Other
Payer: COMMERCIAL

## 2021-02-09 PROCEDURE — 97110 THERAPEUTIC EXERCISES: CPT

## 2021-02-09 NOTE — PROGRESS NOTES
Diagnosis: Cervical radiculopathy (M54.12)  Paresthesia (R20.2)    Next MD visit: none scheduled  Fall Risk: standard         Precautions: n/a          Medication Changes since last visit?: No  Subjective: Pt reports 0/10 current neck and shoulder pain. 1x10 5 sec holds - Shoulder strength reassessment  - Cervical ROM reassessment  - seated c-retraction 2x10  - supine B shoulder flexion with wand 2x10  - supine R/L c-rotation 15x ea  - supine B chest press 3# 2x10  - supine B shoulder flexion 2# 2x10  - s horizontal abd/add with [address] 2x10  - standing B shoulder ext with deb   20# 1x10  - standing c-retraction with towel at wall 2x10    - seated c-extension 10x  - seated c-retraction with self OP 10x  - seated c-retraction with left cervical rotation 10x  - s

## 2021-02-09 NOTE — PROGRESS NOTES
Diagnosis: Cervical radiculopathy (M54.12)  Paresthesia (R20.2)    Next MD visit: none scheduled  Fall Risk: standard         Precautions: n/a          Medication Changes since last visit?: No  Subjective: Pt reports 0/10 current neck and shoulder pain. 2x10  - supine R/L c-rotation 15x ea  - supine B chest press 3# 2x10  - supine B shoulder flexion 2# 2x10  - supine B shoulder horizontal abd/add 1-2# 2x10  - supine R/L shoulder ER with YTB above wrists 1x10 ea  - sidelying L shoulder ER 1# 2x10  - standi

## 2021-02-18 ENCOUNTER — APPOINTMENT (OUTPATIENT)
Dept: PHYSICAL THERAPY | Age: 61
End: 2021-02-18
Attending: Other
Payer: COMMERCIAL

## 2021-02-23 ENCOUNTER — APPOINTMENT (OUTPATIENT)
Dept: PHYSICAL THERAPY | Age: 61
End: 2021-02-23
Attending: Other
Payer: COMMERCIAL

## 2021-02-23 ENCOUNTER — OFFICE VISIT (OUTPATIENT)
Dept: PHYSICAL THERAPY | Age: 61
End: 2021-02-23
Attending: FAMILY MEDICINE
Payer: COMMERCIAL

## 2021-02-23 PROCEDURE — 97110 THERAPEUTIC EXERCISES: CPT

## 2021-02-23 NOTE — PROGRESS NOTES
Physical Therapy Discharge Summary    Diagnosis: Cervical radiculopathy (M54.12)  Paresthesia (R20.2)    Next MD visit: none scheduled  Fall Risk: standard         Precautions: n/a          Medication Changes since last visit?: No  Subjective: Pt reports 5 ea  - supine B chest press 3# 2x10  - supine B shoulder flexion 2# 2x10  - supine B shoulder horizontal abd/add 1-2# 2x10  - supine R/L shoulder ER with YTB above wrists 1x10 ea  - sidelying L shoulder ER 1# 2x10  - standing B shoulder mid scap rows with G

## 2021-03-09 DIAGNOSIS — Z23 NEED FOR VACCINATION: ICD-10-CM

## 2021-06-23 ENCOUNTER — OFFICE VISIT (OUTPATIENT)
Dept: NEUROLOGY | Facility: CLINIC | Age: 61
End: 2021-06-23
Payer: COMMERCIAL

## 2021-06-23 VITALS
BODY MASS INDEX: 40.59 KG/M2 | HEIGHT: 61 IN | WEIGHT: 215 LBS | SYSTOLIC BLOOD PRESSURE: 126 MMHG | DIASTOLIC BLOOD PRESSURE: 80 MMHG

## 2021-06-23 DIAGNOSIS — G43.009 MIGRAINE WITHOUT AURA AND WITHOUT STATUS MIGRAINOSUS, NOT INTRACTABLE: Primary | ICD-10-CM

## 2021-06-23 PROCEDURE — 3074F SYST BP LT 130 MM HG: CPT | Performed by: OTHER

## 2021-06-23 PROCEDURE — 3008F BODY MASS INDEX DOCD: CPT | Performed by: OTHER

## 2021-06-23 PROCEDURE — 3079F DIAST BP 80-89 MM HG: CPT | Performed by: OTHER

## 2021-06-23 PROCEDURE — 99214 OFFICE O/P EST MOD 30 MIN: CPT | Performed by: OTHER

## 2021-06-23 RX ORDER — RIZATRIPTAN BENZOATE 10 MG
10 TABLET ORAL DAILY
Qty: 9 TABLET | Refills: 5 | Status: SHIPPED | OUTPATIENT
Start: 2021-06-23 | End: 2021-12-09

## 2021-06-23 RX ORDER — FREMANEZUMAB-VFRM 225 MG/1.5ML
225 INJECTION SUBCUTANEOUS
Qty: 3 EACH | Refills: 3 | Status: SHIPPED | OUTPATIENT
Start: 2021-06-23 | End: 2022-01-10

## 2021-06-23 RX ORDER — AMITRIPTYLINE HYDROCHLORIDE 25 MG/1
TABLET, FILM COATED ORAL
Qty: 90 TABLET | Refills: 3 | Status: SHIPPED | OUTPATIENT
Start: 2021-06-23 | End: 2021-07-13

## 2021-06-23 NOTE — PROGRESS NOTES
Neurology follow-up visit     Referred By: Dr. Gutiérrez ref. provider found    Chief Complaint: Patient presents with:  Migraine: LOV 8/10/20. States had headaches every day last week. Was relieved by maxalt and then reoccurred.  Usually takes maxalt 8-10 times much better, she was getting them maybe twice a month and able to abort them very quickly with Maxalt.    - MRI BRAIN (CPT=70551); Future  - MRI SPINE CERVICAL (CPT=72141); Future  - MRI SPINE THORACIC (CPT=72146);  Future  - JENNY,DIRECT,REFLEX TITER + SPECI about a week of headache and 3 weeks of no headaches. And there was 5 days or 7 days of headache she would use the Maxalt up to twice a day.   She also was complaining of worsening of quality of sleep that might have been contributing to her increased head Auto-injector, Inject 0.3 mg into the muscle., Disp: , Rfl:   •  Acetaminophen-Codeine (TYLENOL WITH CODEINE #3) 300-30 MG Oral Tab, Take 1 tablet by mouth 2 (two) times daily as needed (severe pain).  (Patient not taking: Reported on 6/23/2021 ), Disp: 60 apparent distress, well nourished, well groomed. Head- Normocephalic, atraumatic  Eyes- No redness or swelling  ENT- Hearing intake, normal glutition  Neck- No masses or adenopathy    Neurological:     Mental Status- Alert and oriented x3.   Normal attenti be continued with Ajovy and Maxalt but additionally amitriptyline will be started especially to help her quality of sleep as well as migraine control. Education and counseling provided to patient.  Instructed patient to call my office or seek medi

## 2021-07-12 ENCOUNTER — TELEPHONE (OUTPATIENT)
Dept: NEUROLOGY | Facility: CLINIC | Age: 61
End: 2021-07-12

## 2021-07-12 NOTE — TELEPHONE ENCOUNTER
Dr Moustapha Fall prescribed ( Amitriptyline HCl 25 MG Oral Tab) she wanted to inform the doctor they make her drowsy in the a.m.'s can they be cut in half? Or a smaller dose.   Please call pt

## 2021-07-13 RX ORDER — AMITRIPTYLINE HYDROCHLORIDE 10 MG/1
TABLET, FILM COATED ORAL
Qty: 90 TABLET | Refills: 3 | Status: SHIPPED | OUTPATIENT
Start: 2021-07-13 | End: 2021-12-27

## 2021-07-13 NOTE — TELEPHONE ENCOUNTER
Spoke to patient, advised Dr Dena Haque has written new Rx for amitriptyline today. Will call back with update after changing dose.

## 2021-09-17 RX ORDER — AMITRIPTYLINE HYDROCHLORIDE 25 MG/1
TABLET, FILM COATED ORAL
Qty: 270 TABLET | Refills: 1 | OUTPATIENT
Start: 2021-09-17

## 2021-09-17 NOTE — TELEPHONE ENCOUNTER
Amitriptyline 25 mg made patient drowsy so Dr. Concepción Block decreased it to amitriptyline 10 mg.

## 2021-12-08 DIAGNOSIS — G43.009 MIGRAINE WITHOUT AURA AND WITHOUT STATUS MIGRAINOSUS, NOT INTRACTABLE: Primary | ICD-10-CM

## 2021-12-08 NOTE — TELEPHONE ENCOUNTER
Rizatriptan Benzoate 10 MG   Take 1 tablet by mouth every day  #9, 5 refills    LOV: 6/23/21  NOV: None scheduled   Last refilled:  6/23/21

## 2021-12-09 RX ORDER — RIZATRIPTAN BENZOATE 10 MG/1
TABLET ORAL
Qty: 9 TABLET | Refills: 5 | Status: SHIPPED | OUTPATIENT
Start: 2021-12-09

## 2021-12-24 DIAGNOSIS — G43.009 MIGRAINE WITHOUT AURA AND WITHOUT STATUS MIGRAINOSUS, NOT INTRACTABLE: Primary | ICD-10-CM

## 2021-12-27 RX ORDER — AMITRIPTYLINE HYDROCHLORIDE 25 MG/1
TABLET, FILM COATED ORAL
Qty: 270 TABLET | Refills: 1 | Status: SHIPPED | OUTPATIENT
Start: 2021-12-27 | End: 2022-01-10

## 2021-12-27 NOTE — TELEPHONE ENCOUNTER
Amitriptyline 25MG Oral Tablet   Take 3 tablets at bedtime  #270, no refills    LOV: 6/23/21  NOV: None scheduled   Last refilled: 7/13/21

## 2021-12-30 DIAGNOSIS — G43.009 MIGRAINE WITHOUT AURA AND WITHOUT STATUS MIGRAINOSUS, NOT INTRACTABLE: Primary | ICD-10-CM

## 2021-12-30 RX ORDER — AMITRIPTYLINE HYDROCHLORIDE 10 MG/1
TABLET, FILM COATED ORAL NIGHTLY
Qty: 90 TABLET | Refills: 3 | OUTPATIENT
Start: 2021-12-30

## 2021-12-30 NOTE — TELEPHONE ENCOUNTER
Per Epic review Amitriptyline HCL 25mg made pt too drowsy & dose decreased to 10mg tabs.  Called & spoke to pt who reports she has been taking 2-3 of the 10mg tabs nightly    Medication:Amitriptyline HCl 10 MG        LOV:6/23/21  NOV:none    [place]

## 2022-01-10 ENCOUNTER — OFFICE VISIT (OUTPATIENT)
Dept: NEUROLOGY | Facility: CLINIC | Age: 62
End: 2022-01-10
Payer: MEDICARE

## 2022-01-10 ENCOUNTER — TELEPHONE (OUTPATIENT)
Dept: NEUROLOGY | Facility: CLINIC | Age: 62
End: 2022-01-10

## 2022-01-10 VITALS — WEIGHT: 210 LBS | HEIGHT: 61 IN | BODY MASS INDEX: 39.65 KG/M2

## 2022-01-10 DIAGNOSIS — M54.12 CERVICAL RADICULOPATHY: ICD-10-CM

## 2022-01-10 DIAGNOSIS — R20.2 PARESTHESIA: ICD-10-CM

## 2022-01-10 DIAGNOSIS — G89.29 CHRONIC PAIN OF RIGHT KNEE: ICD-10-CM

## 2022-01-10 DIAGNOSIS — G43.009 MIGRAINE WITHOUT AURA AND WITHOUT STATUS MIGRAINOSUS, NOT INTRACTABLE: Primary | ICD-10-CM

## 2022-01-10 DIAGNOSIS — M25.561 CHRONIC PAIN OF RIGHT KNEE: ICD-10-CM

## 2022-01-10 PROCEDURE — 3008F BODY MASS INDEX DOCD: CPT | Performed by: OTHER

## 2022-01-10 PROCEDURE — 99213 OFFICE O/P EST LOW 20 MIN: CPT | Performed by: OTHER

## 2022-01-10 RX ORDER — MILNACIPRAN HCL 12.5 MG
2 TABLET ORAL DAILY
Qty: 180 TABLET | Refills: 1 | Status: SHIPPED | OUTPATIENT
Start: 2022-01-10 | End: 2022-01-17

## 2022-01-10 RX ORDER — FREMANEZUMAB-VFRM 225 MG/1.5ML
225 INJECTION SUBCUTANEOUS
Qty: 3 EACH | Refills: 3 | Status: SHIPPED | OUTPATIENT
Start: 2022-01-10

## 2022-01-10 RX ORDER — ROSUVASTATIN CALCIUM 10 MG/1
10 TABLET, COATED ORAL EVERY EVENING
COMMUNITY
Start: 2020-12-03

## 2022-01-10 RX ORDER — TRAMADOL HYDROCHLORIDE 50 MG/1
50 TABLET ORAL AS NEEDED
COMMUNITY
Start: 2021-03-03

## 2022-01-10 RX ORDER — AMITRIPTYLINE HYDROCHLORIDE 10 MG/1
1 TABLET, FILM COATED ORAL DAILY
COMMUNITY
Start: 2022-01-05

## 2022-01-10 NOTE — PROGRESS NOTES
Neurology follow-up visit     Referred By: Dr. Gutiérrez ref. provider found    Chief Complaint: No chief complaint on file. HPI:     Adriana Soria is a 57year old female, who presents for trouble with walking, pain, headaches.   Apparently patient has h JENNY,DIRECT,REFLEX TITER + SPECIFIC ANTIBODIES; Future  - ANTICARDIOLIPIN AB, IGA, QN  - ANTICARDIOLIPIN AB, IGG, QN  - ANTICARDIOLIPIN AB, IGM, QN  - CBC WITH DIFFERENTIAL WITH PLATELET  - IMMUNOFIXATION [ [place];  Future  - HOMOCYSTEINE; Future  - MPO/IL-3 A contributing to her increased headaches. In the meantime she stopped gabapentin since it was unhelpful and causing some weight gain. Amitriptyline was added to Maxalt and Ajovy. Patient came back for follow-up in January 2022.     Point patient also wa taking: Reported on 6/23/2021 ), Disp: 270 capsule, Rfl: 3  •  Chlorhexidine Gluconate 0.12 % Mouth/Throat Solution, Use as directed 30 mL in the mouth or throat daily. , Disp: , Rfl:   •  Desloratadine (CLARINEX) 5 MG Oral Tab, Take 1 tablet by mouth daily SWELLING  Cefuroxime              RASH    ROS:   As in HPI, the rest of the 14 system review was done and was negative      Physical Exam:  There were no vitals filed for this visit. General: No apparent distress, well nourished, well groomed.   Head- No suggestion  For her fibromyalgia she will be continued on Savella. 2. Cervical radiculopathy   Patient will continue working with physiatrist    3. Migraine without aura and without status migrainosus, not intractable  Still not well controlled.   Fouzia

## 2022-01-13 NOTE — TELEPHONE ENCOUNTER
Patient dropped off PA forms that she received for [facility]. Forms filled out. Reviewed and signed by Dr. Linwood Cox and faxed to [address] [place] [address] at [phone].

## 2022-01-17 ENCOUNTER — LAB ENCOUNTER (OUTPATIENT)
Dept: LAB | Facility: HOSPITAL | Age: 62
End: 2022-01-17
Attending: INTERNAL MEDICINE
Payer: MEDICARE

## 2022-01-17 ENCOUNTER — OFFICE VISIT (OUTPATIENT)
Dept: RHEUMATOLOGY | Facility: CLINIC | Age: 62
End: 2022-01-17
Payer: MEDICARE

## 2022-01-17 VITALS
BODY MASS INDEX: 39.65 KG/M2 | WEIGHT: 210 LBS | SYSTOLIC BLOOD PRESSURE: 97 MMHG | DIASTOLIC BLOOD PRESSURE: 64 MMHG | HEART RATE: 93 BPM | RESPIRATION RATE: 16 BRPM | HEIGHT: 61 IN

## 2022-01-17 DIAGNOSIS — M79.7 FIBROMYALGIA: ICD-10-CM

## 2022-01-17 DIAGNOSIS — R76.8 POSITIVE ANA (ANTINUCLEAR ANTIBODY): ICD-10-CM

## 2022-01-17 DIAGNOSIS — R76.0 LUPUS ANTICOAGULANT POSITIVE: ICD-10-CM

## 2022-01-17 DIAGNOSIS — Z86.19 HX OF LYME DISEASE: ICD-10-CM

## 2022-01-17 DIAGNOSIS — R76.8 POSITIVE ANA (ANTINUCLEAR ANTIBODY): Primary | ICD-10-CM

## 2022-01-17 LAB
CK SERPL-CCNC: 67 U/L
CRP SERPL-MCNC: 1.14 MG/DL (ref ?–0.3)
ERYTHROCYTE [SEDIMENTATION RATE] IN BLOOD: 24 MM/HR

## 2022-01-17 PROCEDURE — 3074F SYST BP LT 130 MM HG: CPT | Performed by: INTERNAL MEDICINE

## 2022-01-17 PROCEDURE — 85652 RBC SED RATE AUTOMATED: CPT

## 2022-01-17 PROCEDURE — 36415 COLL VENOUS BLD VENIPUNCTURE: CPT

## 2022-01-17 PROCEDURE — 82550 ASSAY OF CK (CPK): CPT

## 2022-01-17 PROCEDURE — 86618 LYME DISEASE ANTIBODY: CPT

## 2022-01-17 PROCEDURE — 99204 OFFICE O/P NEW MOD 45 MIN: CPT | Performed by: INTERNAL MEDICINE

## 2022-01-17 PROCEDURE — 82085 ASSAY OF ALDOLASE: CPT

## 2022-01-17 PROCEDURE — 86617 LYME DISEASE ANTIBODY: CPT

## 2022-01-17 PROCEDURE — 86140 C-REACTIVE PROTEIN: CPT

## 2022-01-17 PROCEDURE — 3008F BODY MASS INDEX DOCD: CPT | Performed by: INTERNAL MEDICINE

## 2022-01-17 PROCEDURE — 3078F DIAST BP <80 MM HG: CPT | Performed by: INTERNAL MEDICINE

## 2022-01-17 RX ORDER — DULOXETIN HYDROCHLORIDE 30 MG/1
30 CAPSULE, DELAYED RELEASE ORAL DAILY
Qty: 30 CAPSULE | Refills: 1 | Status: SHIPPED | OUTPATIENT
Start: 2022-01-17

## 2022-01-17 NOTE — PROGRESS NOTES
Gilford Cane is a 57year old female who presents for Patient presents with:  Consult: Arthritis and Fibromyalgia  Abnormal Labs: Positive JENNY  Numbness: numbness in legs and tingling in left arm. Leg Pain  Foot Pain  .    HPI:     I had the pleasure of again.   She feels in half a day she gets too sore. She gets bruising in her arms - she was told it was solar purpura by dermatologist at [facility] in 9/2021 -she has gone off nsaids and still getting it.    She does see hematologist Dr. Jemma Akhtar at [facility] PRESCRIBED - PT DOES NOT USE D/T CLAUSTROPHOBIA      Past Surgical History:   Procedure Laterality Date   • CARPAL TUNNEL RELEASE Bilateral    • COLONOSCOPY  2017    At outside facility reported as normal per patient   • FOOT SURGERY Right 12/2021   • KNEE no hx of thyroid disease, no hx of DM  Joint/Muscluskeltal: see HPI, no lower back pain,   All other ROS are negative.      EXAM:   BP 97/64   Pulse 93   Resp 16   Ht 5' 1\" (1.549 m)   Wt 210 lb (95.3 kg)   BMI 39.68 kg/m²   HEENT: Clear oropharynx, no ora x10(3) uL 8.2   RBC      3.80 - 5.30 x10(6)uL 4.84   Hemoglobin      12.0 - 16.0 g/dL 13.2   Hematocrit      35.0 - 48.0 % 42.5   MCV      80.0 - 100.0 fL 87.8   MCH      26.0 - 34.0 pg 27.3   MCHC      31.0 - 37.0 g/dL 31.1   RDW-SD      35.1 - 46.3 fL 45 Fibromyalgia  Abnormal Labs: Positive JENNY  Numbness: numbness in legs and tingling in left arm. Leg Pain  Foot Pain    1.  Fibromyalgia/osteoarthritis - joitn pain jayce in legs   Was on savella - now not able to take   Try dulxoetine 30mg a day   In the pas her neuropathy as neurology was the one to initially check this test.     Sarah Rangel MD  2/1/2022   10:35 AM

## 2022-01-17 NOTE — PATIENT INSTRUCTIONS
1. Check labs   2. Check lymes test again   3. Start duloxetine 30mg a day -   4. Return to clinic in 3-4 weeks.  -    Duloxetine Delayed Release Oral Capsule 30 mg  Uses  This medicine is used for the following purposes:  · anxiety  · depression  · eating medicine. Family should check on the patient often. Call the doctor if patient becomes more depressed, has thoughts of suicide, or shows changes in behavior. Call the doctor if there are any signs of confusion or unusual changes in behavior.   Tell the do stool  · dark, tarry stool  · blurring or changes of vision  · severe or persistent vomiting  A few people may have an allergic reaction to this medicine. Symptoms can include difficulty breathing, skin rash, itching, swelling, or severe dizziness.  If you

## 2022-01-19 LAB
ALDOLASE, SERUM: 5.3 U/L
B BURGDOR IGG+IGM SER QL: POSITIVE

## 2022-01-22 LAB
B. BURGDORFERI AB, IGM BY WB: NEGATIVE
B. BURGDORFERI, IGG WB: POSITIVE

## 2022-01-31 ENCOUNTER — TELEPHONE (OUTPATIENT)
Dept: RHEUMATOLOGY | Facility: CLINIC | Age: 62
End: 2022-01-31

## 2022-01-31 NOTE — TELEPHONE ENCOUNTER
Fax received from [facility] and [facility] about patient with positive lyme disease result. Completed what I could. Forms provided for provider to review and complete.

## 2022-02-01 ENCOUNTER — OFFICE VISIT (OUTPATIENT)
Dept: OTOLARYNGOLOGY | Facility: CLINIC | Age: 62
End: 2022-02-01
Payer: MEDICARE

## 2022-02-01 VITALS — WEIGHT: 210 LBS | HEIGHT: 61 IN | BODY MASS INDEX: 39.65 KG/M2

## 2022-02-01 DIAGNOSIS — H69.83 DYSFUNCTION OF BOTH EUSTACHIAN TUBES: ICD-10-CM

## 2022-02-01 DIAGNOSIS — H61.20 WAX IN EAR: Primary | ICD-10-CM

## 2022-02-01 PROCEDURE — 3008F BODY MASS INDEX DOCD: CPT | Performed by: OTOLARYNGOLOGY

## 2022-02-01 PROCEDURE — 69210 REMOVE IMPACTED EAR WAX UNI: CPT | Performed by: OTOLARYNGOLOGY

## 2022-02-01 RX ORDER — PREDNISONE 20 MG/1
TABLET ORAL
Qty: 7 TABLET | Refills: 0 | Status: SHIPPED | OUTPATIENT
Start: 2022-02-01

## 2022-02-01 RX ORDER — MELATONIN
COMMUNITY
Start: 2022-01-10

## 2022-02-01 RX ORDER — OMEPRAZOLE 40 MG/1
CAPSULE, DELAYED RELEASE ORAL
COMMUNITY
Start: 2022-01-07

## 2022-02-01 RX ORDER — MELOXICAM 15 MG/1
15 TABLET ORAL DAILY
COMMUNITY
Start: 2022-01-16 | End: 2022-02-11

## 2022-02-01 RX ORDER — TIZANIDINE 2 MG/1
2 TABLET ORAL EVERY 8 HOURS PRN
COMMUNITY
Start: 2022-01-12

## 2022-02-01 RX ORDER — NAPROXEN 500 MG/1
TABLET ORAL
COMMUNITY
Start: 2022-01-21 | End: 2022-02-11

## 2022-02-01 RX ORDER — IBUPROFEN 800 MG/1
TABLET ORAL
COMMUNITY
Start: 2022-01-23

## 2022-02-01 NOTE — TELEPHONE ENCOUNTER
Susie Grijalva of [facility] , [phone].  Asking for completed Lyme Disease report to be faxed back to: [phone]

## 2022-02-09 NOTE — TELEPHONE ENCOUNTER
Last filled: 1/17/22 #30 cap with 1 refill   LOV: 1/17/22  Future Appointments   Date Time Provider Kelly Saldaña   2/11/2022  1:30 PM Judie Estrada MD [facility]

## 2022-02-10 RX ORDER — DULOXETIN HYDROCHLORIDE 30 MG/1
CAPSULE, DELAYED RELEASE ORAL
Qty: 30 CAPSULE | Refills: 1 | Status: SHIPPED | OUTPATIENT
Start: 2022-02-10 | End: 2022-02-11

## 2022-02-11 ENCOUNTER — TELEPHONE (OUTPATIENT)
Dept: RHEUMATOLOGY | Facility: CLINIC | Age: 62
End: 2022-02-11

## 2022-02-11 ENCOUNTER — OFFICE VISIT (OUTPATIENT)
Dept: RHEUMATOLOGY | Facility: CLINIC | Age: 62
End: 2022-02-11
Payer: MEDICARE

## 2022-02-11 VITALS
RESPIRATION RATE: 16 BRPM | SYSTOLIC BLOOD PRESSURE: 119 MMHG | HEART RATE: 97 BPM | WEIGHT: 210 LBS | BODY MASS INDEX: 39.65 KG/M2 | HEIGHT: 61 IN | DIASTOLIC BLOOD PRESSURE: 83 MMHG

## 2022-02-11 DIAGNOSIS — M79.7 FIBROMYALGIA: Primary | ICD-10-CM

## 2022-02-11 DIAGNOSIS — Z86.19 HX OF LYME DISEASE: ICD-10-CM

## 2022-02-11 PROCEDURE — 99214 OFFICE O/P EST MOD 30 MIN: CPT | Performed by: INTERNAL MEDICINE

## 2022-02-11 PROCEDURE — 3008F BODY MASS INDEX DOCD: CPT | Performed by: INTERNAL MEDICINE

## 2022-02-11 PROCEDURE — 3074F SYST BP LT 130 MM HG: CPT | Performed by: INTERNAL MEDICINE

## 2022-02-11 PROCEDURE — 3079F DIAST BP 80-89 MM HG: CPT | Performed by: INTERNAL MEDICINE

## 2022-02-11 RX ORDER — DULOXETIN HYDROCHLORIDE 30 MG/1
30 CAPSULE, DELAYED RELEASE ORAL 2 TIMES DAILY
Qty: 60 CAPSULE | Refills: 1 | Status: SHIPPED | OUTPATIENT
Start: 2022-02-11 | End: 2022-03-07

## 2022-02-11 NOTE — TELEPHONE ENCOUNTER
Sudha Olivas from [facility] stated they need the quantity of the following medication .      Naltrexone HCl Does not apply Powder    Fax [phone]

## 2022-02-11 NOTE — PATIENT INSTRUCTIONS
Summary:  1. Try low dose naltrexone 4.5mg a day   2. Stop Tramadol 50mg a day -   3. If not better with low dose nalterxone , then can try to increaseduloxetine 30mg twice a day. 4. Return to clinic in 4 weeks.  -

## 2022-02-16 ENCOUNTER — TELEPHONE (OUTPATIENT)
Dept: OTOLARYNGOLOGY | Facility: CLINIC | Age: 62
End: 2022-02-16

## 2022-02-16 RX ORDER — SULFAMETHOXAZOLE AND TRIMETHOPRIM 800; 160 MG/1; MG/1
1 TABLET ORAL 2 TIMES DAILY
Qty: 28 TABLET | Refills: 0 | Status: SHIPPED | OUTPATIENT
Start: 2022-02-16 | End: 2022-03-02

## 2022-02-16 NOTE — TELEPHONE ENCOUNTER
Per pt ears still feel clogged, now having congestion in her nose,  Sinus pressure pain on face, green drainage from nose, pt has tired sudafed. Per pt clogged ears is also making her feel very dizzy. pleaes advise

## 2022-02-16 NOTE — TELEPHONE ENCOUNTER
Per pt still having sinus problems, asking if antibiotics can be prescribed? Please advise thank you.

## 2022-02-24 ENCOUNTER — TELEPHONE (OUTPATIENT)
Dept: NEUROLOGY | Facility: CLINIC | Age: 62
End: 2022-02-24

## 2022-02-25 ENCOUNTER — TELEPHONE (OUTPATIENT)
Dept: NEUROLOGY | Facility: CLINIC | Age: 62
End: 2022-02-25

## 2022-02-25 NOTE — TELEPHONE ENCOUNTER
[employer] help desk is trying to get the Patient a prescription [facility] into [facility] for this medication  Milnacipran HCl (SAVELLA) 12.5 MG Oral Tab

## 2022-02-28 NOTE — TELEPHONE ENCOUNTER
Dawn Mathew was denied by patient's insurance as they are want her to try and fail [employer]. Left detailed message for patient notifying her of denial.  Asked her to call the office back to see if she has tried emgality before.

## 2022-03-02 NOTE — TELEPHONE ENCOUNTER
Spoke to Jorden Mitchell. She states that savella was approved until 1/11/23. As for Ajovy -- it was denied but pharmacy run a test claim on Em[employer]. She states that it was prescribed by Dr. Hiral Braun. Explained that this is not the office for Dr. Hiral Braun. She was understanding.

## 2022-03-07 RX ORDER — DULOXETIN HYDROCHLORIDE 30 MG/1
CAPSULE, DELAYED RELEASE ORAL
Qty: 60 CAPSULE | Refills: 1 | Status: SHIPPED | OUTPATIENT
Start: 2022-03-07 | End: 2022-03-10

## 2022-03-07 NOTE — TELEPHONE ENCOUNTER
LOV:2/11/22   Last Refilled:#60, 1rf 2/11/22    Future Appointments   Date Time Provider Kelly Saldaña   3/10/2022  4:30 PM Omar Owen MD [facility] [place]       Summary:  1. Try low dose naltrexone 4.5mg a day   2. Stop Tramadol 50mg a day -   3. If not better with low dose nalterxone , then can try to increaseduloxetine 30mg twice a day. 4. Return to clinic in 4 weeks. -   - wait to hear from 1900 Kaiser Nielsen MD  1/17/2022      Please advise.

## 2022-03-10 ENCOUNTER — OFFICE VISIT (OUTPATIENT)
Dept: RHEUMATOLOGY | Facility: CLINIC | Age: 62
End: 2022-03-10
Payer: MEDICARE

## 2022-03-10 VITALS
HEART RATE: 98 BPM | SYSTOLIC BLOOD PRESSURE: 145 MMHG | DIASTOLIC BLOOD PRESSURE: 83 MMHG | HEIGHT: 61 IN | WEIGHT: 212 LBS | BODY MASS INDEX: 40.02 KG/M2 | RESPIRATION RATE: 16 BRPM

## 2022-03-10 DIAGNOSIS — R76.8 POSITIVE ANA (ANTINUCLEAR ANTIBODY): ICD-10-CM

## 2022-03-10 DIAGNOSIS — Z86.19 HX OF LYME DISEASE: ICD-10-CM

## 2022-03-10 DIAGNOSIS — M79.7 FIBROMYALGIA: Primary | ICD-10-CM

## 2022-03-10 PROCEDURE — 3079F DIAST BP 80-89 MM HG: CPT | Performed by: INTERNAL MEDICINE

## 2022-03-10 PROCEDURE — 99214 OFFICE O/P EST MOD 30 MIN: CPT | Performed by: INTERNAL MEDICINE

## 2022-03-10 PROCEDURE — 3077F SYST BP >= 140 MM HG: CPT | Performed by: INTERNAL MEDICINE

## 2022-03-10 PROCEDURE — 3008F BODY MASS INDEX DOCD: CPT | Performed by: INTERNAL MEDICINE

## 2022-03-10 RX ORDER — AMITRIPTYLINE HYDROCHLORIDE 10 MG/1
20 TABLET, FILM COATED ORAL NIGHTLY
Qty: 180 TABLET | Refills: 1 | Status: SHIPPED | OUTPATIENT
Start: 2022-03-10

## 2022-03-10 NOTE — PATIENT INSTRUCTIONS
1. Try low dose naltrexone 4.5mg a day again  2. Cont. Tramadol 50mg a dya   3. Cont. tizandine for arm pain   4. Cont. amitrpityline - increase from 10mg to 20mg at night  5. . Return to clinic in 4 -6 weeks.  -     -consider CBD oil

## 2022-04-01 RX ORDER — AMITRIPTYLINE HYDROCHLORIDE 10 MG/1
20 TABLET, FILM COATED ORAL NIGHTLY
Qty: 180 TABLET | Refills: 1 | Status: CANCELLED | OUTPATIENT
Start: 2022-04-01

## 2022-04-12 ENCOUNTER — OFFICE VISIT (OUTPATIENT)
Dept: RHEUMATOLOGY | Facility: CLINIC | Age: 62
End: 2022-04-12
Payer: MEDICARE

## 2022-04-12 VITALS
WEIGHT: 212 LBS | HEIGHT: 61 IN | RESPIRATION RATE: 16 BRPM | DIASTOLIC BLOOD PRESSURE: 77 MMHG | HEART RATE: 112 BPM | SYSTOLIC BLOOD PRESSURE: 115 MMHG | BODY MASS INDEX: 40.02 KG/M2

## 2022-04-12 DIAGNOSIS — M79.7 FIBROMYALGIA: Primary | ICD-10-CM

## 2022-04-12 DIAGNOSIS — R76.8 POSITIVE ANA (ANTINUCLEAR ANTIBODY): ICD-10-CM

## 2022-04-12 PROCEDURE — 3008F BODY MASS INDEX DOCD: CPT | Performed by: INTERNAL MEDICINE

## 2022-04-12 PROCEDURE — 3074F SYST BP LT 130 MM HG: CPT | Performed by: INTERNAL MEDICINE

## 2022-04-12 PROCEDURE — 3078F DIAST BP <80 MM HG: CPT | Performed by: INTERNAL MEDICINE

## 2022-04-12 PROCEDURE — 99214 OFFICE O/P EST MOD 30 MIN: CPT | Performed by: INTERNAL MEDICINE

## 2022-04-12 RX ORDER — HYDROXYCHLOROQUINE SULFATE 200 MG/1
200 TABLET, FILM COATED ORAL 2 TIMES DAILY
Qty: 60 TABLET | Refills: 1 | Status: SHIPPED | OUTPATIENT
Start: 2022-04-12

## 2022-04-12 NOTE — PATIENT INSTRUCTIONS
1. Will try savella 12.5mg twice aday   - 2. If can't get that covered finish antibiotic from infectious disease doctor   3. Then start hydroxychlroquine 200mg twic a day   4. . Cont. Tramadol 50mg a dya   5.. Cont. tizandine for arm pain   6. Cont. amitrpityline 20mg at night  7. Return to clinic in 2-3 months.

## 2022-06-02 ENCOUNTER — OFFICE VISIT (OUTPATIENT)
Dept: OTOLARYNGOLOGY | Facility: CLINIC | Age: 62
End: 2022-06-02
Payer: MEDICARE

## 2022-06-02 VITALS — HEIGHT: 61 IN | TEMPERATURE: 98 F | WEIGHT: 212 LBS | BODY MASS INDEX: 40.02 KG/M2

## 2022-06-02 DIAGNOSIS — H69.83 DYSFUNCTION OF BOTH EUSTACHIAN TUBES: Primary | ICD-10-CM

## 2022-06-02 DIAGNOSIS — J01.90 ACUTE SINUSITIS, RECURRENCE NOT SPECIFIED, UNSPECIFIED LOCATION: ICD-10-CM

## 2022-06-02 DIAGNOSIS — Z91.09 ENVIRONMENTAL ALLERGIES: ICD-10-CM

## 2022-06-02 PROCEDURE — 99213 OFFICE O/P EST LOW 20 MIN: CPT | Performed by: OTOLARYNGOLOGY

## 2022-06-02 PROCEDURE — 3008F BODY MASS INDEX DOCD: CPT | Performed by: OTOLARYNGOLOGY

## 2022-06-02 RX ORDER — DEXAMETHASONE 6 MG/1
TABLET ORAL
Qty: 4 TABLET | Refills: 0 | Status: SHIPPED | OUTPATIENT
Start: 2022-06-02

## 2022-06-02 RX ORDER — SULFAMETHOXAZOLE AND TRIMETHOPRIM 800; 160 MG/1; MG/1
1 TABLET ORAL EVERY 12 HOURS
Qty: 14 TABLET | Refills: 0 | Status: SHIPPED | OUTPATIENT
Start: 2022-06-02

## 2022-06-13 ENCOUNTER — TELEPHONE (OUTPATIENT)
Dept: RHEUMATOLOGY | Facility: CLINIC | Age: 62
End: 2022-06-13

## 2022-06-13 ENCOUNTER — OFFICE VISIT (OUTPATIENT)
Dept: RHEUMATOLOGY | Facility: CLINIC | Age: 62
End: 2022-06-13
Payer: MEDICARE

## 2022-06-13 VITALS
WEIGHT: 203 LBS | DIASTOLIC BLOOD PRESSURE: 83 MMHG | SYSTOLIC BLOOD PRESSURE: 122 MMHG | BODY MASS INDEX: 38.33 KG/M2 | HEIGHT: 61 IN | HEART RATE: 105 BPM | RESPIRATION RATE: 16 BRPM

## 2022-06-13 DIAGNOSIS — M79.7 FIBROMYALGIA: Primary | ICD-10-CM

## 2022-06-13 DIAGNOSIS — Z86.19 HX OF LYME DISEASE: ICD-10-CM

## 2022-06-13 PROCEDURE — 3008F BODY MASS INDEX DOCD: CPT | Performed by: INTERNAL MEDICINE

## 2022-06-13 PROCEDURE — 3079F DIAST BP 80-89 MM HG: CPT | Performed by: INTERNAL MEDICINE

## 2022-06-13 PROCEDURE — 3074F SYST BP LT 130 MM HG: CPT | Performed by: INTERNAL MEDICINE

## 2022-06-13 PROCEDURE — 99214 OFFICE O/P EST MOD 30 MIN: CPT | Performed by: INTERNAL MEDICINE

## 2022-06-13 RX ORDER — HYDROXYCHLOROQUINE SULFATE 200 MG/1
TABLET, FILM COATED ORAL
Qty: 60 TABLET | Refills: 1 | OUTPATIENT
Start: 2022-06-13

## 2022-06-13 NOTE — PATIENT INSTRUCTIONS
1. increase savella 25mg twice aday   2. Will send in [employer] assist   3. Monitor effects   4. . Cont. Tramadol 50mg a dya   5.. Cont. tizandine for arm pain   6. Cont. amitrpityline 20mg at night  7. Return to clinic in 3 months.      Bryant Youngblood MD  - integrative medicine -     [facility]  [address] [place] [address] 77, [address] [phone]

## 2022-06-13 NOTE — TELEPHONE ENCOUNTER
Dr. Jayden Esteves requested to complete patient assistance for [facility]   Patient completed her form and provided to us. Provider form completed.    Faxed to Fady Kelly

## 2022-06-13 NOTE — TELEPHONE ENCOUNTER
Last filled: 4/12/22 #60 tab with 1 refill   LOV: 4/12/22  Future Appointments   Date Time Provider Kelly Piper   6/13/2022  3:10 PM Kyle Magana MD 2014 [facility]     Labs:   Component      Latest Ref Rng & Units 1/17/2022   B.  BURGDORFERI, IGG WB      Negative Positive (A)   B. BURGDORFERI AB, IGM BY WB      Negative Negative   ALDOLASE, SERUM      1.5 - 8.1 U/L 5.3   CK      26 - 192 U/L 67   C-REACTIVE PROTEIN      <0.30 mg/dL 1.14 (H)   SED RATE      0 - 30 mm/Hr 24   Lyme Screen IgG and IgM      Negative Positive (A)

## 2022-08-08 RX ORDER — MILNACIPRAN HCL 12.5 MG
TABLET ORAL
Qty: 60 TABLET | Refills: 3 | OUTPATIENT
Start: 2022-08-08

## 2022-08-08 NOTE — TELEPHONE ENCOUNTER
Left message to call back. Will confirm with patient if she needs refill for [facility]. She is receiving it through [place]. Patient is on 25mg.

## 2022-08-12 DIAGNOSIS — G43.009 MIGRAINE WITHOUT AURA AND WITHOUT STATUS MIGRAINOSUS, NOT INTRACTABLE: Primary | ICD-10-CM

## 2022-08-12 RX ORDER — AMITRIPTYLINE HYDROCHLORIDE 10 MG/1
20 TABLET, FILM COATED ORAL NIGHTLY
Qty: 180 TABLET | Refills: 1 | Status: SHIPPED | OUTPATIENT
Start: 2022-08-12

## 2022-08-12 NOTE — TELEPHONE ENCOUNTER
amitriptyline 10 MG Oral Tab  Take 2 tablets (20 mg total) by mouth nightly.   #180, 1 refill     LOV: 1/10/22  NOV: None Scheduled  Last refilled: 3/10/22    Last refilled by Dr. Carolyn Blackmon

## 2022-09-02 DIAGNOSIS — G43.009 MIGRAINE WITHOUT AURA AND WITHOUT STATUS MIGRAINOSUS, NOT INTRACTABLE: ICD-10-CM

## 2022-09-02 RX ORDER — AMITRIPTYLINE HYDROCHLORIDE 25 MG/1
TABLET, FILM COATED ORAL
Qty: 270 TABLET | Refills: 1 | OUTPATIENT
Start: 2022-09-02

## 2022-09-02 NOTE — TELEPHONE ENCOUNTER
Amitriptyline was refilled on 8/12 for 90 day with 1 refill for amitriptyline 10 mg.  Per office note, patient only able to tolerate small dose of amitriptyline.

## 2022-09-13 ENCOUNTER — OFFICE VISIT (OUTPATIENT)
Dept: RHEUMATOLOGY | Facility: CLINIC | Age: 62
End: 2022-09-13
Payer: MEDICARE

## 2022-09-13 VITALS
BODY MASS INDEX: 36.63 KG/M2 | WEIGHT: 194 LBS | SYSTOLIC BLOOD PRESSURE: 134 MMHG | HEIGHT: 61 IN | HEART RATE: 105 BPM | DIASTOLIC BLOOD PRESSURE: 87 MMHG | RESPIRATION RATE: 16 BRPM

## 2022-09-13 DIAGNOSIS — M79.7 FIBROMYALGIA: Primary | ICD-10-CM

## 2022-09-13 DIAGNOSIS — Z86.19 HX OF LYME DISEASE: ICD-10-CM

## 2022-09-13 PROCEDURE — 3075F SYST BP GE 130 - 139MM HG: CPT | Performed by: INTERNAL MEDICINE

## 2022-09-13 PROCEDURE — 99214 OFFICE O/P EST MOD 30 MIN: CPT | Performed by: INTERNAL MEDICINE

## 2022-09-13 PROCEDURE — 3008F BODY MASS INDEX DOCD: CPT | Performed by: INTERNAL MEDICINE

## 2022-09-13 PROCEDURE — 3079F DIAST BP 80-89 MM HG: CPT | Performed by: INTERNAL MEDICINE

## 2022-09-13 RX ORDER — TRAMADOL HYDROCHLORIDE 50 MG/1
50 TABLET ORAL
Qty: 30 TABLET | Refills: 5 | Status: SHIPPED | OUTPATIENT
Start: 2022-09-13

## 2022-09-13 NOTE — PATIENT INSTRUCTIONS
1. Cont.  savella 25mg twice aday   2. Cont. abbvie assist   3. Monitor effects   4. . Cont. Tramadol 50mg a day   5.. Cont. tizandine for arm pain   6. Cont. amitrpityline - ok to take 20mg at night  7. Return to clinic in 3- 6  months.      Books on authrs on lyme disease -   Lori Eddy MD

## 2022-09-28 ENCOUNTER — TELEPHONE (OUTPATIENT)
Dept: RHEUMATOLOGY | Facility: CLINIC | Age: 62
End: 2022-09-28

## 2022-09-28 NOTE — TELEPHONE ENCOUNTER
Called [facility] at [phone] spoke with rep Cassia Vasquez) and informed that this medicine is not received and must have been delivered to patient. She said she was able to locate documents to delivery it to patient. She will fix that in her system. They made an error and sent medicine to wrong address. Marcelo Wells said they will correct this and send it to patient address. Patient notified of above. She will bo us if she has any issues receiving Savella. No further action needed.

## 2022-09-28 NOTE — TELEPHONE ENCOUNTER
Bessie Jacob- [facility] [employer] Patient  Assistance calling to inform that Shu Balzarine was delivered 9/26/2022 according to [employer] and signed for by Noreen Alex,  but office is stating they do not have it.    She wanted to provide order# [id number], patients name might not be on package due to HIPAA, please reach out to patient to inform either way

## 2022-10-04 ENCOUNTER — TELEPHONE (OUTPATIENT)
Dept: RHEUMATOLOGY | Facility: CLINIC | Age: 62
End: 2022-10-04

## 2022-10-04 NOTE — TELEPHONE ENCOUNTER
Please let her know that skin bleeding from [facility] has been reported (greater than 1%). Since she had the problem before [facility], that makes it less likely that it is from the medication. I advise her to continue [facility] for now. Discuss with Dr. Mahamed Felipe  October 7th at her appointment. Please let her know. Thanks.

## 2022-10-04 NOTE — TELEPHONE ENCOUNTER
Spoke with patient - name and  verified. She was informed of Dr. Ouida Mcburney note below and verbalized understanding.

## 2022-10-07 ENCOUNTER — OFFICE VISIT (OUTPATIENT)
Dept: RHEUMATOLOGY | Facility: CLINIC | Age: 62
End: 2022-10-07
Payer: MEDICARE

## 2022-10-07 VITALS
WEIGHT: 191 LBS | SYSTOLIC BLOOD PRESSURE: 120 MMHG | DIASTOLIC BLOOD PRESSURE: 75 MMHG | BODY MASS INDEX: 36.06 KG/M2 | HEIGHT: 61 IN | HEART RATE: 101 BPM | RESPIRATION RATE: 16 BRPM

## 2022-10-07 DIAGNOSIS — R23.3 BLEEDING INTO THE SKIN: Primary | ICD-10-CM

## 2022-10-07 DIAGNOSIS — R76.0 LUPUS ANTICOAGULANT POSITIVE: ICD-10-CM

## 2022-10-07 DIAGNOSIS — M79.7 FIBROMYALGIA: ICD-10-CM

## 2022-10-07 PROCEDURE — 3078F DIAST BP <80 MM HG: CPT | Performed by: INTERNAL MEDICINE

## 2022-10-07 PROCEDURE — 99214 OFFICE O/P EST MOD 30 MIN: CPT | Performed by: INTERNAL MEDICINE

## 2022-10-07 PROCEDURE — 3074F SYST BP LT 130 MM HG: CPT | Performed by: INTERNAL MEDICINE

## 2022-10-07 PROCEDURE — 3008F BODY MASS INDEX DOCD: CPT | Performed by: INTERNAL MEDICINE

## 2022-10-07 NOTE — PATIENT INSTRUCTIONS
1. Ok stop  savella 25mg twice aday x 2 weeks, - if not better - then go back on it. 2. Cont. abbvie assist   3. Check labs   4. . Cont. Tramadol 50mg a day   5.. Cont. tizandine for arm pain   6. Cont. amitrpityline 20mg at night  7. Return to clinic in3- 6  months.

## 2022-10-12 ENCOUNTER — LAB REQUISITION (OUTPATIENT)
Dept: LAB | Age: 62
End: 2022-10-12

## 2022-10-12 LAB
PA AA PRP: 67 %
PA ADP HI DOSE PRP: 75 %
PA ADP LO DOSE PRP: 76 %
PA COLL PRP: 74 %
PA EPINEPH PRP: 71 %
PA PRP-IMP: NORMAL
PA RIST HI DOSE PRP: 81 %
PA RIST LO DOSE PRP: 0 %
PLATELET # BLD: 343 K/MCL (ref 140–450)

## 2022-10-12 PROCEDURE — 85049 AUTOMATED PLATELET COUNT: CPT | Performed by: CLINICAL MEDICAL LABORATORY

## 2022-10-12 PROCEDURE — PSEU8377 PLATELET COUNT: Performed by: CLINICAL MEDICAL LABORATORY

## 2022-10-12 PROCEDURE — 85576 BLOOD PLATELET AGGREGATION: CPT | Performed by: CLINICAL MEDICAL LABORATORY

## 2022-10-12 PROCEDURE — PSEU8411 PLATELET AGGREGATION: Performed by: CLINICAL MEDICAL LABORATORY

## 2022-10-13 LAB — PLATELET # BLD AUTO: 286 K/MCL (ref 140–450)

## 2022-10-19 ENCOUNTER — TELEPHONE (OUTPATIENT)
Dept: RHEUMATOLOGY | Facility: CLINIC | Age: 62
End: 2022-10-19

## 2022-10-19 NOTE — TELEPHONE ENCOUNTER
Patient came to office stating [facility] sent Gennette Spurling to office again. Patient notified we have not received. Robert Dean gave her the one we received on 10/07/2022. Patient stated Thierno Poe needs new refills. Informed we will send a new assistance form. Fax confirmation received. Patient notified.

## 2022-10-20 NOTE — TELEPHONE ENCOUNTER
Caprice a representative with [employer] assist is requesting a note on letterhead explaining that patient's Savella medication was not received and a replacement is needed. Please fax note to [phone]. Per Wesley Huerta, patient's medication was delivered to the [facility] [address] on 10/13 at 9:58am. Delivery confirmation was signed by [employer]

## 2022-10-24 ENCOUNTER — TELEPHONE (OUTPATIENT)
Dept: RHEUMATOLOGY | Facility: CLINIC | Age: 62
End: 2022-10-24

## 2022-10-25 NOTE — TELEPHONE ENCOUNTER
Medication  by pt [facility]

## 2022-11-16 ENCOUNTER — TELEPHONE (OUTPATIENT)
Facility: CLINIC | Age: 62
End: 2022-11-16

## 2022-11-16 ENCOUNTER — OFFICE VISIT (OUTPATIENT)
Facility: CLINIC | Age: 62
End: 2022-11-16
Payer: MEDICARE

## 2022-11-16 VITALS
WEIGHT: 193 LBS | HEIGHT: 61 IN | BODY MASS INDEX: 36.44 KG/M2 | SYSTOLIC BLOOD PRESSURE: 144 MMHG | HEART RATE: 109 BPM | DIASTOLIC BLOOD PRESSURE: 92 MMHG

## 2022-11-16 DIAGNOSIS — K21.9 GASTROESOPHAGEAL REFLUX DISEASE, UNSPECIFIED WHETHER ESOPHAGITIS PRESENT: Primary | ICD-10-CM

## 2022-11-16 DIAGNOSIS — Z80.0 FAMILY HISTORY OF COLON CANCER: ICD-10-CM

## 2022-11-16 DIAGNOSIS — Z12.11 SCREENING FOR COLON CANCER: Primary | ICD-10-CM

## 2022-11-16 PROCEDURE — 3008F BODY MASS INDEX DOCD: CPT | Performed by: INTERNAL MEDICINE

## 2022-11-16 PROCEDURE — 99213 OFFICE O/P EST LOW 20 MIN: CPT | Performed by: INTERNAL MEDICINE

## 2022-11-16 PROCEDURE — 3080F DIAST BP >= 90 MM HG: CPT | Performed by: INTERNAL MEDICINE

## 2022-11-16 PROCEDURE — 3077F SYST BP >= 140 MM HG: CPT | Performed by: INTERNAL MEDICINE

## 2022-11-16 RX ORDER — SODIUM, POTASSIUM,MAG SULFATES 17.5-3.13G
SOLUTION, RECONSTITUTED, ORAL ORAL
Qty: 1 EACH | Refills: 0 | Status: SHIPPED | OUTPATIENT
Start: 2022-11-16

## 2022-11-16 NOTE — TELEPHONE ENCOUNTER
Scheduled for:  Colonoscopy [address], EGD [address]  Provider Name:  Dr Serina Alarcon  Date:  12/5/2022  [facility]  Sedation:  MAC  Time:  10:00am, (pt is aware that [facility] will call the day before to confirm arrival time)  Prep:  Suprep  Meds/Allergies Reconciled?:  Physician reviewed     Diagnosis with codes:  Screening for colon cancer Z12.11, Family hx of colon cancer Z80.0  Was patient informed to call insurance with codes (Y/N):  Yes      Referral sent?:  Yes   [facility] or [address] notified?:  Electronic case request was sent to [facility] via [employer]. Medication Orders:  N/A   Misc Orders:  Patient was informed that they will need a COVID 19 test prior to their procedure. Patient verbally understood & will await a phone call from [facility] to schedule.      Further instructions given by staff:  Prep instructions were given to patient

## 2022-11-16 NOTE — PATIENT INSTRUCTIONS
1.  Schedule colonoscopy for colorectal cancer screening and a family history of colon cancer following a split dose Suprep and monitored anesthesia care.   2.  Schedule concurrent upper endoscopy for gastroesophageal reflux

## 2022-12-02 ENCOUNTER — LAB REQUISITION (OUTPATIENT)
Dept: SURGERY | Age: 62
End: 2022-12-02
Payer: MEDICARE

## 2022-12-02 DIAGNOSIS — Z01.818 PREOPERATIVE EXAMINATION, UNSPECIFIED: ICD-10-CM

## 2022-12-03 LAB — SARS-COV-2 RNA RESP QL NAA+PROBE: NOT DETECTED

## 2022-12-05 ENCOUNTER — SURGERY CENTER DOCUMENTATION (OUTPATIENT)
Dept: SURGERY | Age: 62
End: 2022-12-05

## 2022-12-05 ENCOUNTER — LAB REQUISITION (OUTPATIENT)
Dept: SURGERY | Age: 62
End: 2022-12-05
Payer: MEDICARE

## 2022-12-05 DIAGNOSIS — Z12.11 SPECIAL SCREENING FOR MALIGNANT NEOPLASMS, COLON: ICD-10-CM

## 2022-12-05 DIAGNOSIS — Z80.0 FAMILY HISTORY MALIGNANT NEOPLASM OF BILIARY TRACT: ICD-10-CM

## 2022-12-05 PROCEDURE — 88305 TISSUE EXAM BY PATHOLOGIST: CPT | Performed by: INTERNAL MEDICINE

## 2022-12-05 PROCEDURE — 88312 SPECIAL STAINS GROUP 1: CPT | Performed by: INTERNAL MEDICINE

## 2022-12-05 NOTE — PROCEDURES
[address] Endoscopy Report      Date of Procedure:  12/05/22      Preoperative Diagnosis:  1. Colorectal cancer screening  2. Family history of colon cancer  3. Gastroesophageal reflux      Postoperative Diagnosis:  1. Colon polyps  2. Sigmoid colon diverticulosis  3. Internal hemorrhoids  4. Small hiatal hernia  5. Surgical changes post vertical banded gastroplasty and stromal tumor excision  6. Rule out Candida esophagitis      Procedure:    Colonoscopy with polypectomy  Esophagogastroduodenoscopy with biopsy      Surgeon:  Monty Farias M.D. Anesthesia:  Monitored anesthesia care  Cecal withdrawal time: 19 minutes  EBL:  Insignificant      Brief History: This is a 60year old female who presents for a screening colonoscopy in the setting of a family history of colon cancer in her mother over the age of 61 and a personal history of colon polyps. The patient's last colonoscopy was 5 years prior. She notes occasional left lower quadrant abdominal discomfort, constipation and hemorrhoidal bleeding. The patient also has a recurrence of gastroesophageal reflux symptoms. 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 A concurrent upper endoscopy is being performed as well. Technique:  After informed consent, the patient was placed in the left lateral recumbent position. Digital rectal examination revealed no palpable intraluminal abnormalities. An Olympus variable stiffness 190 series HD pediatric colonoscope was inserted into the rectum and advanced under direct vision by following the lumen to the terminal ileum. The colon was examined upon withdrawal in the left lateral recumbent position. Following colonoscopy, an Olympus adult HD gastroscope was inserted into the hypopharynx and advanced under direct vision into the esophagus, stomach and duodenum.   The endoscope was withdrawn to the stomach where retroflexion of the angulus, body, cardia and fundus was performed. The instrument was straightened, insufflated air and fluid were suctioned and the endoscope was withdrawn. The procedures were well tolerated without immediate complication. Findings:  The preparation of the colon was very good. The terminal ileum was examined for 5 cm and visually normal.  The ileocecal valve was well preserved. The visualized colonic mucosa from the cecum to the anal verge was normal with an intact vascular pattern. There were #3 polyps seen within the colon which removed as follows:    1. In the cecum there was a 9-10 mm sessile polyp which was cold snare excised and retrieved. The site was closed with #3 hemostatic clips. 2.  At the junction of the ascending colon and cecum there was a 5 mm sessile polyp which was cold snare excised and retrieved. 3.  In the proximal descending colon near the splenic flexure there was a 3-4 mm sessile polyp which was cold snare excised and retrieved. Inspection of all sites revealed no evidence of ongoing bleeding. There were multiple diverticula seen in the sigmoid colon without current signs of complication. There were no other colonic polyps, mass lesions, vascular anomalies or signs of inflammation seen. Retroflexion in the right colon and rectum revealed internal hemorrhoids with overlying red kenneth markings in the latter. The esophagus was normal with the exception of a few punctate areas of beige exudate in the mid esophagus which was biopsied. The GE junction and diaphragmatic impression were at 35/36 cm with a small 1 cm sliding hiatal hernia. There was no resistance to passage of the endoscope through the gastroesophageal junction. The stomach distended appropriately with insufflated air.   The mucosa of the stomach including cardia, fundus, gastric body and antrum was normal with the exception of subepithelial prominence due to the patient's gastric band and suture from prior stromal tumor excision. There were no signs of band erosion. The duodenal bulb and post bulbar regions were normal.      Impression:  1. Colon polyps  2. Uncomplicated sigmoid colon diverticulosis  3. Internal hemorrhoids with stigmata of bleeding  4. Small hiatal hernia  5. Surgical changes post of vertical banded gastroplasty and stromal tumor excision  6. Rule out Candida esophagitis    Recommendations:  1. Follow-up biopsy results. 2.  Further recommendations pending biopsy.           Ashley Martinez MD  12/5/2022

## 2022-12-06 ENCOUNTER — MED REC SCAN ONLY (OUTPATIENT)
Facility: CLINIC | Age: 62
End: 2022-12-06

## 2022-12-06 RX ORDER — FLUCONAZOLE 200 MG/1
TABLET ORAL
Qty: 15 TABLET | Refills: 0 | Status: SHIPPED | OUTPATIENT
Start: 2022-12-06

## 2022-12-08 ENCOUNTER — TELEPHONE (OUTPATIENT)
Facility: CLINIC | Age: 62
End: 2022-12-08

## 2022-12-08 NOTE — TELEPHONE ENCOUNTER
Health maintenance updated.  Last colonoscopy done 12/5/2022, recall placed into [facility]reach, next due on December 2025 per Dr. Tuan Bailey

## 2022-12-08 NOTE — TELEPHONE ENCOUNTER
----- Message from Hunter Durant MD sent at 12/6/2022  6:40 PM CST -----  I left a message on the patient's voicemail. She had #3 polyps removed. #2 were tubular adenomas including an adenoma that was 10 mm in size. The third polyp was hyperplastic. The upper endoscopy revealed normal changes post a gastroplasty. Candida esophagitis was present which the patient has had before. I queried the patient regarding recent antibiotics, oral or inhaled/swallowed steroids. I recommended a 14-day course of fluconazole which I sent to the pharmacy. I advised a surveillance colonoscopy in 3 years. I also sent the following [employer] message to the patient:    \"Sarahi,    I left a message on your voicemail. There is no drug interaction between the antifungal medicine and your current medications. Please let me know if you have any questions. Dr. Bindu Lal"    GI RNs: Please enter colonoscopy recall for 3 years.

## 2022-12-14 ENCOUNTER — TELEPHONE (OUTPATIENT)
Dept: RHEUMATOLOGY | Facility: CLINIC | Age: 62
End: 2022-12-14

## 2022-12-14 ENCOUNTER — OFFICE VISIT (OUTPATIENT)
Dept: RHEUMATOLOGY | Facility: CLINIC | Age: 62
End: 2022-12-14
Payer: MEDICARE

## 2022-12-14 VITALS
HEIGHT: 61 IN | BODY MASS INDEX: 36.25 KG/M2 | SYSTOLIC BLOOD PRESSURE: 109 MMHG | RESPIRATION RATE: 16 BRPM | HEART RATE: 108 BPM | DIASTOLIC BLOOD PRESSURE: 77 MMHG | WEIGHT: 192 LBS

## 2022-12-14 DIAGNOSIS — M79.7 FIBROMYALGIA: Primary | ICD-10-CM

## 2022-12-14 DIAGNOSIS — R23.3 BLEEDING INTO THE SKIN: ICD-10-CM

## 2022-12-14 PROCEDURE — 3008F BODY MASS INDEX DOCD: CPT | Performed by: INTERNAL MEDICINE

## 2022-12-14 PROCEDURE — 3074F SYST BP LT 130 MM HG: CPT | Performed by: INTERNAL MEDICINE

## 2022-12-14 PROCEDURE — 99214 OFFICE O/P EST MOD 30 MIN: CPT | Performed by: INTERNAL MEDICINE

## 2022-12-14 PROCEDURE — 3078F DIAST BP <80 MM HG: CPT | Performed by: INTERNAL MEDICINE

## 2022-12-14 NOTE — TELEPHONE ENCOUNTER
Send in new form for [employer] assist - or new script to my [employer] assist   - can you make sure patient mitesh' need a new form filled out for 2023 - her last form was 10/2022.

## 2022-12-14 NOTE — PATIENT INSTRUCTIONS
1. Cont. savella 25mg twice aday   2. Cont. abbvie assist   3. Cont. amitrpityline 20mg at night  4. . Cont. Tramadol 50mg a day   5.. Cont. tizandine for arm pain   6. Return to clinic in 6  months.

## 2022-12-30 ENCOUNTER — TELEPHONE (OUTPATIENT)
Dept: RHEUMATOLOGY | Facility: CLINIC | Age: 62
End: 2022-12-30

## 2022-12-30 NOTE — TELEPHONE ENCOUNTER
Spoke to patient and informed script ready for  at . Patient verbalized understanding and had no further questions at this time.

## 2022-12-30 NOTE — TELEPHONE ENCOUNTER
Patient is calling to confirm if the medication [facility]) is ready for  at the office.

## 2023-01-06 ENCOUNTER — TELEPHONE (OUTPATIENT)
Dept: RHEUMATOLOGY | Facility: CLINIC | Age: 63
End: 2023-01-06

## 2023-01-06 NOTE — TELEPHONE ENCOUNTER
Pt states that she is having an allergic reaction to her arthritis medication. Transferred call to [facility] nurse.

## 2023-01-06 NOTE — TELEPHONE ENCOUNTER
Discussed with patient. She saw her dermatologist and was informed that the bruising and bleeding she has been having is likely a reaction of the savella. This provider also informed her that stopping for 2 weeks was likely not enough and would need to stop for 3 months. They then recommended discussing the use of other arthritis medications to control her fibromyalgia. No difficulty breathing or swelling of mouth, throat, or tongue. Patient requested earlier follow up. Scheduled for Monday.      Future Appointments   Date Time Provider Kelly Saldaña   1/9/2023  3:10 PM Ludwig Parish MD 2014 [facility]   6/14/2023 10:00 AM Ludwig Parish MD 2014 [facility]

## 2023-01-09 ENCOUNTER — OFFICE VISIT (OUTPATIENT)
Dept: RHEUMATOLOGY | Facility: CLINIC | Age: 63
End: 2023-01-09
Payer: MEDICARE

## 2023-01-09 VITALS
SYSTOLIC BLOOD PRESSURE: 114 MMHG | HEART RATE: 108 BPM | WEIGHT: 195 LBS | BODY MASS INDEX: 36.82 KG/M2 | DIASTOLIC BLOOD PRESSURE: 81 MMHG | RESPIRATION RATE: 16 BRPM | HEIGHT: 61 IN

## 2023-01-09 DIAGNOSIS — R23.3 BLEEDING INTO THE SKIN: ICD-10-CM

## 2023-01-09 DIAGNOSIS — Z51.81 THERAPEUTIC DRUG MONITORING: ICD-10-CM

## 2023-01-09 DIAGNOSIS — M06.4 UNDIFFERENTIATED INFLAMMATORY ARTHRITIS (HCC): Primary | ICD-10-CM

## 2023-01-09 DIAGNOSIS — R76.8 POSITIVE ANA (ANTINUCLEAR ANTIBODY): ICD-10-CM

## 2023-01-09 DIAGNOSIS — M79.7 FIBROMYALGIA: ICD-10-CM

## 2023-01-09 PROCEDURE — 99214 OFFICE O/P EST MOD 30 MIN: CPT | Performed by: INTERNAL MEDICINE

## 2023-01-09 PROCEDURE — 3074F SYST BP LT 130 MM HG: CPT | Performed by: INTERNAL MEDICINE

## 2023-01-09 PROCEDURE — 3079F DIAST BP 80-89 MM HG: CPT | Performed by: INTERNAL MEDICINE

## 2023-01-09 PROCEDURE — 3008F BODY MASS INDEX DOCD: CPT | Performed by: INTERNAL MEDICINE

## 2023-01-09 RX ORDER — LEFLUNOMIDE 10 MG/1
10 TABLET ORAL DAILY
Qty: 30 TABLET | Refills: 1 | Status: SHIPPED | OUTPATIENT
Start: 2023-01-09 | End: 2023-01-09

## 2023-01-09 NOTE — PATIENT INSTRUCTIONS
1.  Stop savella - taper to 25mg a day x 1 week, then 12.5mg a day x 1 week, then off   2. Try leflunomide 10mg a day -   3. Cont. amitrpityline 20mg at night  4. . Cont. Tramadol 50mg a day   5.. Cont. tizandine for arm pain   6.   Return to clinic in 1 month - Wednesday at noon

## 2023-01-11 RX ORDER — LEFLUNOMIDE 10 MG/1
10 TABLET ORAL DAILY
Qty: 30 TABLET | Refills: 1 | Status: SHIPPED | OUTPATIENT
Start: 2023-01-11

## 2023-02-01 RX ORDER — LEFLUNOMIDE 10 MG/1
TABLET ORAL
Qty: 30 TABLET | Refills: 1 | Status: SHIPPED | OUTPATIENT
Start: 2023-02-01

## 2023-02-09 DIAGNOSIS — G43.009 MIGRAINE WITHOUT AURA AND WITHOUT STATUS MIGRAINOSUS, NOT INTRACTABLE: ICD-10-CM

## 2023-02-09 RX ORDER — AMITRIPTYLINE HYDROCHLORIDE 10 MG/1
TABLET, FILM COATED ORAL
Qty: 180 TABLET | Refills: 1 | Status: SHIPPED | OUTPATIENT
Start: 2023-02-09

## 2023-02-09 NOTE — TELEPHONE ENCOUNTER
Refill Request    Medication:amitriptyline 10 MG Oral Tab  Sig - Route: Take 2 tablets (20 mg total) by mouth nightly.  - Oral    LOV:01/10/2022  NOV:none    [place]08/22/22

## 2023-02-10 ENCOUNTER — LAB ENCOUNTER (OUTPATIENT)
Dept: LAB | Facility: HOSPITAL | Age: 63
End: 2023-02-10
Attending: INTERNAL MEDICINE
Payer: MEDICARE

## 2023-02-10 ENCOUNTER — OFFICE VISIT (OUTPATIENT)
Dept: RHEUMATOLOGY | Facility: CLINIC | Age: 63
End: 2023-02-10

## 2023-02-10 VITALS
HEART RATE: 89 BPM | SYSTOLIC BLOOD PRESSURE: 109 MMHG | HEIGHT: 61 IN | BODY MASS INDEX: 37.19 KG/M2 | DIASTOLIC BLOOD PRESSURE: 75 MMHG | WEIGHT: 197 LBS | RESPIRATION RATE: 16 BRPM

## 2023-02-10 DIAGNOSIS — M06.4 UNDIFFERENTIATED INFLAMMATORY ARTHRITIS (HCC): ICD-10-CM

## 2023-02-10 DIAGNOSIS — M06.4 UNDIFFERENTIATED INFLAMMATORY ARTHRITIS (HCC): Primary | ICD-10-CM

## 2023-02-10 DIAGNOSIS — Z51.81 THERAPEUTIC DRUG MONITORING: ICD-10-CM

## 2023-02-10 DIAGNOSIS — M79.7 FIBROMYALGIA: ICD-10-CM

## 2023-02-10 LAB
ALT SERPL-CCNC: 17 U/L
AST SERPL-CCNC: 16 U/L (ref 15–37)
CREAT BLD-MCNC: 0.85 MG/DL
CRP SERPL-MCNC: 0.71 MG/DL (ref ?–0.3)
DEPRECATED RDW RBC AUTO: 47.1 FL (ref 35.1–46.3)
ERYTHROCYTE [DISTWIDTH] IN BLOOD BY AUTOMATED COUNT: 14 % (ref 11–15)
ERYTHROCYTE [SEDIMENTATION RATE] IN BLOOD: 50 MM/HR
GFR SERPLBLD BASED ON 1.73 SQ M-ARVRAT: 77 ML/MIN/1.73M2 (ref 60–?)
HCT VFR BLD AUTO: 40.9 %
HGB BLD-MCNC: 12.7 G/DL
MCH RBC QN AUTO: 28.3 PG (ref 26–34)
MCHC RBC AUTO-ENTMCNC: 31.1 G/DL (ref 31–37)
MCV RBC AUTO: 91.1 FL
PLATELET # BLD AUTO: 245 10(3)UL (ref 150–450)
RBC # BLD AUTO: 4.49 X10(6)UL
WBC # BLD AUTO: 7.7 X10(3) UL (ref 4–11)

## 2023-02-10 PROCEDURE — 36415 COLL VENOUS BLD VENIPUNCTURE: CPT

## 2023-02-10 PROCEDURE — 3008F BODY MASS INDEX DOCD: CPT | Performed by: INTERNAL MEDICINE

## 2023-02-10 PROCEDURE — 82565 ASSAY OF CREATININE: CPT

## 2023-02-10 PROCEDURE — 84450 TRANSFERASE (AST) (SGOT): CPT

## 2023-02-10 PROCEDURE — 3078F DIAST BP <80 MM HG: CPT | Performed by: INTERNAL MEDICINE

## 2023-02-10 PROCEDURE — 99214 OFFICE O/P EST MOD 30 MIN: CPT | Performed by: INTERNAL MEDICINE

## 2023-02-10 PROCEDURE — 3074F SYST BP LT 130 MM HG: CPT | Performed by: INTERNAL MEDICINE

## 2023-02-10 PROCEDURE — 84460 ALANINE AMINO (ALT) (SGPT): CPT

## 2023-02-10 PROCEDURE — 86140 C-REACTIVE PROTEIN: CPT

## 2023-02-10 PROCEDURE — 85027 COMPLETE CBC AUTOMATED: CPT

## 2023-02-10 PROCEDURE — 85652 RBC SED RATE AUTOMATED: CPT

## 2023-02-10 RX ORDER — LEFLUNOMIDE 10 MG/1
10 TABLET ORAL DAILY
Qty: 30 TABLET | Refills: 1 | Status: SHIPPED | OUTPATIENT
Start: 2023-02-10

## 2023-02-10 RX ORDER — TIZANIDINE 2 MG/1
2 TABLET ORAL EVERY 8 HOURS PRN
Qty: 60 TABLET | Refills: 2 | Status: SHIPPED | OUTPATIENT
Start: 2023-02-10

## 2023-02-10 RX ORDER — TRAMADOL HYDROCHLORIDE 50 MG/1
50 TABLET ORAL
Qty: 30 TABLET | Refills: 5 | Status: SHIPPED | OUTPATIENT
Start: 2023-02-10

## 2023-02-10 NOTE — PATIENT INSTRUCTIONS
1.  Still stay off  savella  2. Cont. leflunomide 10mg a day - for now. 3.  Cont. amitrpityline 20mg at night  4. . Cont. Tramadol 50mg a day   5.. Cont. tizandine for arm pain   6. Return to clinic in 2 month  7.  Check labs today and  Then again in 2  month

## 2023-03-28 ENCOUNTER — TELEPHONE (OUTPATIENT)
Facility: CLINIC | Age: 63
End: 2023-03-28

## 2023-03-28 DIAGNOSIS — Z98.84 LAP-BAND SURGERY STATUS: Primary | ICD-10-CM

## 2023-03-28 NOTE — TELEPHONE ENCOUNTER
Pt states that she has been having a lot of problems with heartburn and would like to speak to RN. Please call.

## 2023-03-28 NOTE — TELEPHONE ENCOUNTER
Dr Melvi Selby and spoke to the patient,  and name verified. The patient reported worsening \"heartburn\" for 2 weeks associated w/ chest discomfort, usually occurs at night. No shortness of breath. She stated, she is avoiding food triggers,caffeine (the patient mentioned some foods). Does not go to bed immediately after dinner and she is elevating her head in bed. She is taking Protonix BID , gaviscon prn. It was advised by her PCP to see you or do an xray d/t hx esophagitis. Please advise    Thank you.

## 2023-03-29 NOTE — TELEPHONE ENCOUNTER
I spoke to the patient. Symptoms as below. She has heartburn during the day and at night. She has been taking pantoprazole for 2 weeks (in the morning and after dinner) to no avail. The symptoms are dissimilar to the symptoms that she experienced at the time of the endoscopy revealing Candida esophagitis. She has fluid in the lap band. I have recommended that she take pantoprazole twice daily 15-30 minutes before a meal and add 20 mg of famotidine at bedtime. I am recommending an esophagram as the next step. Order placed. Further recommendations pending this result and clinical course.

## 2023-04-02 ENCOUNTER — LAB ENCOUNTER (OUTPATIENT)
Dept: LAB | Facility: HOSPITAL | Age: 63
End: 2023-04-02
Attending: INTERNAL MEDICINE
Payer: MEDICARE

## 2023-04-02 DIAGNOSIS — Z98.84 LAP-BAND SURGERY STATUS: ICD-10-CM

## 2023-04-03 LAB — SARS-COV-2 RNA RESP QL NAA+PROBE: NOT DETECTED

## 2023-04-05 ENCOUNTER — HOSPITAL ENCOUNTER (OUTPATIENT)
Dept: GENERAL RADIOLOGY | Facility: HOSPITAL | Age: 63
Discharge: HOME | End: 2023-04-05
Attending: INTERNAL MEDICINE
Payer: MEDICARE

## 2023-04-05 DIAGNOSIS — Z98.84 LAP-BAND SURGERY STATUS: ICD-10-CM

## 2023-04-05 PROCEDURE — 74220 X-RAY XM ESOPHAGUS 1CNTRST: CPT | Performed by: INTERNAL MEDICINE

## 2023-04-24 ENCOUNTER — OFFICE VISIT (OUTPATIENT)
Dept: RHEUMATOLOGY | Facility: CLINIC | Age: 63
End: 2023-04-24

## 2023-04-24 ENCOUNTER — LAB ENCOUNTER (OUTPATIENT)
Dept: LAB | Facility: HOSPITAL | Age: 63
End: 2023-04-24
Attending: INTERNAL MEDICINE
Payer: MEDICARE

## 2023-04-24 VITALS
SYSTOLIC BLOOD PRESSURE: 111 MMHG | WEIGHT: 189.81 LBS | HEIGHT: 61 IN | BODY MASS INDEX: 35.84 KG/M2 | HEART RATE: 77 BPM | RESPIRATION RATE: 16 BRPM | DIASTOLIC BLOOD PRESSURE: 72 MMHG

## 2023-04-24 DIAGNOSIS — Z51.81 THERAPEUTIC DRUG MONITORING: ICD-10-CM

## 2023-04-24 DIAGNOSIS — R23.3 BLEEDING INTO THE SKIN: ICD-10-CM

## 2023-04-24 DIAGNOSIS — M06.4 UNDIFFERENTIATED INFLAMMATORY ARTHRITIS (HCC): Primary | ICD-10-CM

## 2023-04-24 DIAGNOSIS — M06.4 UNDIFFERENTIATED INFLAMMATORY ARTHRITIS (HCC): ICD-10-CM

## 2023-04-24 LAB
CRP SERPL-MCNC: <0.29 MG/DL (ref ?–0.3)
ERYTHROCYTE [SEDIMENTATION RATE] IN BLOOD: 10 MM/HR

## 2023-04-24 PROCEDURE — 99214 OFFICE O/P EST MOD 30 MIN: CPT | Performed by: INTERNAL MEDICINE

## 2023-04-24 PROCEDURE — 3008F BODY MASS INDEX DOCD: CPT | Performed by: INTERNAL MEDICINE

## 2023-04-24 PROCEDURE — 3074F SYST BP LT 130 MM HG: CPT | Performed by: INTERNAL MEDICINE

## 2023-04-24 PROCEDURE — 86140 C-REACTIVE PROTEIN: CPT

## 2023-04-24 PROCEDURE — 3078F DIAST BP <80 MM HG: CPT | Performed by: INTERNAL MEDICINE

## 2023-04-24 PROCEDURE — 83516 IMMUNOASSAY NONANTIBODY: CPT

## 2023-04-24 PROCEDURE — 85652 RBC SED RATE AUTOMATED: CPT

## 2023-04-24 PROCEDURE — 36415 COLL VENOUS BLD VENIPUNCTURE: CPT

## 2023-04-24 RX ORDER — LEFLUNOMIDE 10 MG/1
10 TABLET ORAL DAILY
Qty: 90 TABLET | Refills: 1 | Status: SHIPPED | OUTPATIENT
Start: 2023-04-24

## 2023-04-24 NOTE — PATIENT INSTRUCTIONS
1.  Will check for pemphigus - check lbs   2. Cont. leflunomide 10mg a day - for now. 3 if anitbody testing is negative , then ok to stop leflunomide and restart savella 25mg twice a day   4. . Cont. Tramadol 50mg a day as needed. 5.. Cont. tizandine for arm pain as needed  6. Return to clinic in 2 month  7.  Cont. amitrpityline 20mg at night

## 2023-04-27 LAB
Lab: 10.1 U/ML
Lab: 7.2 U/ML

## 2023-05-17 ENCOUNTER — TELEPHONE (OUTPATIENT)
Dept: RHEUMATOLOGY | Facility: CLINIC | Age: 63
End: 2023-05-17

## 2023-05-17 ENCOUNTER — OFFICE VISIT (OUTPATIENT)
Facility: CLINIC | Age: 63
End: 2023-05-17

## 2023-05-17 VITALS
HEART RATE: 89 BPM | DIASTOLIC BLOOD PRESSURE: 73 MMHG | SYSTOLIC BLOOD PRESSURE: 107 MMHG | HEIGHT: 61 IN | BODY MASS INDEX: 35.68 KG/M2 | WEIGHT: 189 LBS

## 2023-05-17 DIAGNOSIS — Z80.0 FAMILY HISTORY OF COLON CANCER: ICD-10-CM

## 2023-05-17 DIAGNOSIS — R12 HEARTBURN: Primary | ICD-10-CM

## 2023-05-17 DIAGNOSIS — K21.9 GASTROESOPHAGEAL REFLUX DISEASE WITHOUT ESOPHAGITIS: ICD-10-CM

## 2023-05-17 DIAGNOSIS — Z86.010 HISTORY OF COLON POLYPS: ICD-10-CM

## 2023-05-17 PROCEDURE — 3008F BODY MASS INDEX DOCD: CPT | Performed by: INTERNAL MEDICINE

## 2023-05-17 PROCEDURE — 3074F SYST BP LT 130 MM HG: CPT | Performed by: INTERNAL MEDICINE

## 2023-05-17 PROCEDURE — 99213 OFFICE O/P EST LOW 20 MIN: CPT | Performed by: INTERNAL MEDICINE

## 2023-05-17 PROCEDURE — 3078F DIAST BP <80 MM HG: CPT | Performed by: INTERNAL MEDICINE

## 2023-05-17 RX ORDER — PANTOPRAZOLE SODIUM 40 MG/1
40 TABLET, DELAYED RELEASE ORAL
Qty: 60 TABLET | Refills: 1 | Status: SHIPPED | OUTPATIENT
Start: 2023-05-17

## 2023-05-17 NOTE — TELEPHONE ENCOUNTER
Patient was in office for appointment with GI today. Patient was inform today that Dr. Evelin Lambert had sent refill on 5/2 and was confirm receive. She should contact pharmacy to verify if her medication was mail to her home.

## 2023-05-17 NOTE — TELEPHONE ENCOUNTER
Patient is asking if her medication was put in or if it has been delivered here. Please fu. Salvella 25mg.

## 2023-05-20 NOTE — PATIENT INSTRUCTIONS
1.  Continue pantoprazole twice daily before breakfast and supper. 2.  Please contact me with a symptom update over the next few weeks. If symptoms continue repeat upper endoscopy will be performed.

## 2023-05-26 ENCOUNTER — TELEPHONE (OUTPATIENT)
Dept: RHEUMATOLOGY | Facility: CLINIC | Age: 63
End: 2023-05-26

## 2023-05-26 NOTE — TELEPHONE ENCOUNTER
Called  And left a VM to patient, inform we receive her medication today from [facility]. Please  today before 4pm or til Tuesday office will be open due to [facility] holiday weekend.

## 2023-06-14 ENCOUNTER — OFFICE VISIT (OUTPATIENT)
Dept: RHEUMATOLOGY | Facility: CLINIC | Age: 63
End: 2023-06-14

## 2023-06-14 ENCOUNTER — LAB ENCOUNTER (OUTPATIENT)
Dept: LAB | Facility: HOSPITAL | Age: 63
End: 2023-06-14
Attending: INTERNAL MEDICINE
Payer: MEDICARE

## 2023-06-14 VITALS
RESPIRATION RATE: 16 BRPM | BODY MASS INDEX: 36.13 KG/M2 | HEART RATE: 85 BPM | SYSTOLIC BLOOD PRESSURE: 106 MMHG | DIASTOLIC BLOOD PRESSURE: 70 MMHG | WEIGHT: 191.38 LBS | HEIGHT: 61 IN

## 2023-06-14 DIAGNOSIS — M06.4 UNDIFFERENTIATED INFLAMMATORY ARTHRITIS (HCC): ICD-10-CM

## 2023-06-14 DIAGNOSIS — Z51.81 THERAPEUTIC DRUG MONITORING: ICD-10-CM

## 2023-06-14 DIAGNOSIS — M06.4 UNDIFFERENTIATED INFLAMMATORY ARTHRITIS (HCC): Primary | ICD-10-CM

## 2023-06-14 DIAGNOSIS — R23.3 BLEEDING INTO THE SKIN: ICD-10-CM

## 2023-06-14 DIAGNOSIS — G43.009 MIGRAINE WITHOUT AURA AND WITHOUT STATUS MIGRAINOSUS, NOT INTRACTABLE: ICD-10-CM

## 2023-06-14 LAB
ALBUMIN SERPL-MCNC: 3 G/DL (ref 3.4–5)
ALBUMIN/GLOB SERPL: 0.9 {RATIO} (ref 1–2)
ALP LIVER SERPL-CCNC: 69 U/L
ALT SERPL-CCNC: 68 U/L
ANION GAP SERPL CALC-SCNC: 4 MMOL/L (ref 0–18)
AST SERPL-CCNC: 56 U/L (ref 15–37)
BILIRUB SERPL-MCNC: 0.8 MG/DL (ref 0.1–2)
BUN BLD-MCNC: 10 MG/DL (ref 7–18)
BUN/CREAT SERPL: 12.7 (ref 10–20)
CALCIUM BLD-MCNC: 9 MG/DL (ref 8.5–10.1)
CHLORIDE SERPL-SCNC: 110 MMOL/L (ref 98–112)
CO2 SERPL-SCNC: 29 MMOL/L (ref 21–32)
CREAT BLD-MCNC: 0.79 MG/DL
CRP SERPL-MCNC: 0.31 MG/DL (ref ?–0.3)
DEPRECATED RDW RBC AUTO: 49.1 FL (ref 35.1–46.3)
ERYTHROCYTE [DISTWIDTH] IN BLOOD BY AUTOMATED COUNT: 15 % (ref 11–15)
ERYTHROCYTE [SEDIMENTATION RATE] IN BLOOD: 22 MM/HR
FASTING STATUS PATIENT QL REPORTED: NO
GFR SERPLBLD BASED ON 1.73 SQ M-ARVRAT: 83 ML/MIN/1.73M2 (ref 60–?)
GLOBULIN PLAS-MCNC: 3.5 G/DL (ref 2.8–4.4)
GLUCOSE BLD-MCNC: 73 MG/DL (ref 70–99)
HCT VFR BLD AUTO: 38 %
HGB BLD-MCNC: 12.1 G/DL
MCH RBC QN AUTO: 28.8 PG (ref 26–34)
MCHC RBC AUTO-ENTMCNC: 31.8 G/DL (ref 31–37)
MCV RBC AUTO: 90.5 FL
OSMOLALITY SERPL CALC.SUM OF ELEC: 294 MOSM/KG (ref 275–295)
PLATELET # BLD AUTO: 260 10(3)UL (ref 150–450)
POTASSIUM SERPL-SCNC: 3.7 MMOL/L (ref 3.5–5.1)
PROT SERPL-MCNC: 6.5 G/DL (ref 6.4–8.2)
RBC # BLD AUTO: 4.2 X10(6)UL
SODIUM SERPL-SCNC: 143 MMOL/L (ref 136–145)
WBC # BLD AUTO: 8 X10(3) UL (ref 4–11)

## 2023-06-14 PROCEDURE — 80053 COMPREHEN METABOLIC PANEL: CPT

## 2023-06-14 PROCEDURE — 3078F DIAST BP <80 MM HG: CPT | Performed by: INTERNAL MEDICINE

## 2023-06-14 PROCEDURE — 86140 C-REACTIVE PROTEIN: CPT

## 2023-06-14 PROCEDURE — 3074F SYST BP LT 130 MM HG: CPT | Performed by: INTERNAL MEDICINE

## 2023-06-14 PROCEDURE — 99214 OFFICE O/P EST MOD 30 MIN: CPT | Performed by: INTERNAL MEDICINE

## 2023-06-14 PROCEDURE — 85027 COMPLETE CBC AUTOMATED: CPT

## 2023-06-14 PROCEDURE — 36415 COLL VENOUS BLD VENIPUNCTURE: CPT

## 2023-06-14 PROCEDURE — 3008F BODY MASS INDEX DOCD: CPT | Performed by: INTERNAL MEDICINE

## 2023-06-14 PROCEDURE — 85652 RBC SED RATE AUTOMATED: CPT

## 2023-06-14 RX ORDER — TRAMADOL HYDROCHLORIDE 50 MG/1
50 TABLET ORAL
Qty: 30 TABLET | Refills: 5 | Status: SHIPPED | OUTPATIENT
Start: 2023-06-14

## 2023-06-14 RX ORDER — TIZANIDINE 2 MG/1
2 TABLET ORAL EVERY 8 HOURS PRN
Qty: 180 TABLET | Refills: 1 | Status: SHIPPED | OUTPATIENT
Start: 2023-06-14

## 2023-06-14 RX ORDER — AMITRIPTYLINE HYDROCHLORIDE 10 MG/1
20 TABLET, FILM COATED ORAL NIGHTLY
Qty: 180 TABLET | Refills: 1 | Status: SHIPPED | OUTPATIENT
Start: 2023-06-14

## 2023-06-14 NOTE — PATIENT INSTRUCTIONS
1. Check labs today   2. Cont. leflunomide 10mg a day - for now. 3. Cont.  savella 25mg once a day - for now - plan is to increase to 25mg twice a day in the future   4. . Cont. Tramadol 50mg a day as needed. 5.. Cont. tizandine for arm pain as needed  6. Return to clinic in 3-4 month  7.  Cont. amitrpityline 20mg at night

## 2023-06-15 ENCOUNTER — TELEPHONE (OUTPATIENT)
Dept: RHEUMATOLOGY | Facility: CLINIC | Age: 63
End: 2023-06-15

## 2023-06-15 DIAGNOSIS — R79.89 ELEVATED LIVER FUNCTION TESTS: Primary | ICD-10-CM

## 2023-06-15 NOTE — TELEPHONE ENCOUNTER
Patient is calling to advise Dr. Lemon Lav she saw her primary care provider yesterday and was advised to stop taking the leflunomide and to take [address] and to recheck the liver in 2 months. Patient is requesting a call back.

## 2023-06-15 NOTE — TELEPHONE ENCOUNTER
ttalked to pt. - Ok to stop the lefulnomdie - and ok to increase the savella to bid - to montior the bruising   Ok to check liver tests in 2 weeks.      In the future - then can try leflunomie 20mg every other day ambulatory

## 2023-06-29 PROBLEM — I83.90 VARICOSE VEINS OF LOWER EXTREMITY: Status: ACTIVE | Noted: 2023-06-29

## 2023-06-29 PROBLEM — M75.52 SUBACROMIAL BURSITIS OF LEFT SHOULDER JOINT: Status: ACTIVE | Noted: 2022-05-18

## 2023-06-29 PROBLEM — I50.30 HEART FAILURE WITH NORMAL EJECTION FRACTION (HCC): Status: ACTIVE | Noted: 2022-08-01

## 2023-06-29 PROBLEM — S99.921A INJURY OF PLANTAR PLATE OF RIGHT FOOT: Status: ACTIVE | Noted: 2020-09-25

## 2023-06-29 PROBLEM — M75.22 BICIPITAL TENDINITIS, LEFT SHOULDER: Status: ACTIVE | Noted: 2021-01-29

## 2023-06-29 PROBLEM — T14.90XA CLOSED WOUND: Status: ACTIVE | Noted: 2023-04-04

## 2023-06-29 PROBLEM — M75.110 PARTIAL THICKNESS ROTATOR CUFF TEAR: Status: ACTIVE | Noted: 2023-02-20

## 2023-06-29 PROBLEM — J45.20 MILD INTERMITTENT ASTHMA: Status: ACTIVE | Noted: 2018-07-25

## 2023-06-29 PROBLEM — J45.20 MILD INTERMITTENT ASTHMA (HCC): Status: ACTIVE | Noted: 2018-07-25

## 2023-06-29 PROBLEM — R79.1 ELEVATED PARTIAL THROMBOPLASTIN TIME (PTT): Status: ACTIVE | Noted: 2022-10-09

## 2023-06-29 PROBLEM — G47.33 OBSTRUCTIVE SLEEP APNEA (ADULT) (PEDIATRIC): Status: ACTIVE | Noted: 2021-03-01

## 2023-06-29 PROBLEM — M50.122 DISORDER OF INTERVERTEBRAL DISC AT C5-C6 LEVEL WITH RADICULOPATHY: Status: ACTIVE | Noted: 2022-01-09

## 2023-06-29 PROBLEM — M25.561 CHRONIC PAIN OF RIGHT KNEE: Status: ACTIVE | Noted: 2020-09-25

## 2023-06-29 PROBLEM — E66.9 OBESITY WITH BODY MASS INDEX 30 OR GREATER: Status: ACTIVE | Noted: 2020-12-29

## 2023-06-29 PROBLEM — L29.9 PRURITIC DISORDER: Status: ACTIVE | Noted: 2023-04-04

## 2023-06-29 PROBLEM — M79.605 PAIN IN BOTH LOWER EXTREMITIES: Status: ACTIVE | Noted: 2023-04-04

## 2023-06-29 PROBLEM — G44.89 CHRONIC MIXED HEADACHE SYNDROME: Status: ACTIVE | Noted: 2021-03-01

## 2023-06-29 PROBLEM — M54.2 NECK PAIN: Status: ACTIVE | Noted: 2020-12-29

## 2023-06-29 PROBLEM — K21.00 GASTROESOPHAGEAL REFLUX DISEASE WITH ESOPHAGITIS WITHOUT HEMORRHAGE: Status: ACTIVE | Noted: 2022-08-01

## 2023-06-29 PROBLEM — M79.7 FIBROMYALGIA: Status: ACTIVE | Noted: 2021-10-24

## 2023-06-29 PROBLEM — M67.919 DISORDER OF ROTATOR CUFF: Status: ACTIVE | Noted: 2021-01-29

## 2023-06-29 PROBLEM — L81.9 DISCOLORATION OF SKIN: Status: ACTIVE | Noted: 2023-04-04

## 2023-06-29 PROBLEM — E53.8 B12 DEFICIENCY: Status: ACTIVE | Noted: 2020-09-25

## 2023-06-29 PROBLEM — I87.309 VENOUS HYPERTENSION: Status: ACTIVE | Noted: 2023-04-04

## 2023-06-29 PROBLEM — R00.2 PALPITATIONS: Status: ACTIVE | Noted: 2022-08-01

## 2023-06-29 PROBLEM — M79.604 PAIN IN BOTH LOWER EXTREMITIES: Status: ACTIVE | Noted: 2023-04-04

## 2023-06-29 PROBLEM — R29.898: Status: ACTIVE | Noted: 2023-04-04

## 2023-06-29 PROBLEM — E87.70 HYPERVOLEMIA: Status: ACTIVE | Noted: 2023-06-29

## 2023-06-29 PROBLEM — M79.89 SWELLING OF LOWER LIMB: Status: ACTIVE | Noted: 2023-04-04

## 2023-06-29 PROBLEM — G89.29 CHRONIC PAIN OF RIGHT KNEE: Status: ACTIVE | Noted: 2020-09-25

## 2023-06-29 PROBLEM — T14.8XXA BRUISING: Status: ACTIVE | Noted: 2022-01-09

## 2023-06-29 PROBLEM — I25.10 ARTERIOSCLEROSIS OF CORONARY ARTERY: Status: ACTIVE | Noted: 2021-02-01

## 2023-06-29 PROBLEM — C49.A0 GASTROINTESTINAL STROMAL TUMOR (GIST) (HCC): Status: ACTIVE | Noted: 2023-06-29

## 2023-06-29 PROBLEM — E54 VITAMIN C DEFICIENCY: Status: ACTIVE | Noted: 2022-10-05

## 2023-06-29 PROBLEM — G62.89 AXONAL NEUROPATHY: Status: ACTIVE | Noted: 2020-09-25

## 2023-06-29 PROBLEM — R79.83 HOMOCYSTEINEMIA: Status: ACTIVE | Noted: 2020-09-25

## 2023-06-29 PROBLEM — L57.0 ACTINIC KERATOSIS: Status: ACTIVE | Noted: 2022-01-09

## 2023-06-29 PROBLEM — M51.24 THORACIC DISC HERNIATION: Status: ACTIVE | Noted: 2022-08-01

## 2023-06-29 PROBLEM — R23.3 SPONTANEOUS ECCHYMOSIS: Status: ACTIVE | Noted: 2021-08-24

## 2023-07-25 NOTE — TELEPHONE ENCOUNTER
Patient is requesting a refill for the medicine below    Savella 25mg 2 x daily    This medicine was supposed to be sent to pharmacy when patient was in the office.   Send to [facility] per patient Dr Cecilia Monroy has sent to this pharmacy before

## 2023-07-25 NOTE — TELEPHONE ENCOUNTER
Requested Prescriptions     Pending Prescriptions Disp Refills    Milnacipran HCl 25 MG Oral Tab 120 tablet 0     Sig: Take 25 mg by mouth in the morning and 25 mg before bedtime. LF: 5/2/23 #120 TAB W/ 11 RF DISCONTINUED  LOV: 6/14/23  Future Appointments   Date Time Provider Kelly Saldaña   7/27/2023  2:30 PM Alexei Ellison MD [facility]   9/18/2023 11:00 AM Maximino Morales MD [address] [place] [address]     Labs:   mponent  Ref Range & Units 6/28/23  9:18 AM   ALBUMIN  3.6 - 5.0 GM/DL 3.8   PROTEIN, TOTAL  6.5 - 8.3 GM/DL 6.5   ALKALINE PHOSPHATASE  30 - 110 U/L 81   ALT (SGPT)  7 - 35 U/L 17   AST (SGOT)  10 - 40 U/L 21   BILIRUBIN,TOTAL  0.2 - 1.4 MG/DL 0.6   BILIRUBIN,DIRECT  0.0 - 0.3 MG/DL 0.1   Resulting Agency [facility]     Specimen Collected: 06/28/23  9:18 AM Last Resulted: 06/28/23  1:57 PM       Component      Latest Ref Rng 6/14/2023   Glucose      70 - 99 mg/dL 73    Sodium      136 - 145 mmol/L 143    Potassium      3.5 - 5.1 mmol/L 3.7    Chloride      98 - 112 mmol/L 110    Carbon Dioxide, Total      21.0 - 32.0 mmol/L 29.0    ANION GAP      0 - 18 mmol/L 4    BUN      7 - 18 mg/dL 10    CREATININE      0.55 - 1.02 mg/dL 0.79    BUN/CREATININE RATIO      10.0 - 20.0  12.7    CALCIUM      8.5 - 10.1 mg/dL 9.0    CALCULATED OSMOLALITY      275 - 295 mOsm/kg 294    eGFR-Cr      >=60 mL/min/1.73m2 83    ALT (SGPT)      13 - 56 U/L 68 (H)    AST (SGOT)      15 - 37 U/L 56 (H)    ALKALINE PHOSPHATASE      50 - 130 U/L 69    Total Bilirubin      0.1 - 2.0 mg/dL 0.8    PROTEIN, TOTAL      6.4 - 8.2 g/dL 6.5    Albumin      3.4 - 5.0 g/dL 3.0 (L)    Globulin      2.8 - 4.4 g/dL 3.5    A/G Ratio      1.0 - 2.0  0.9 (L)    Patient Fasting for CMP?  No    WBC      4.0 - 11.0 x10(3) uL 8.0    RBC      3.80 - 5.30 x10(6)uL 4.20    Hemoglobin      12.0 - 16.0 g/dL 12.1    Hematocrit      35.0 - 48.0 % 38.0    MCV      80.0 - 100.0 fL 90.5    MCH      26.0 - 34.0 pg 28.8    MCHC 31.0 - 37.0 g/dL 31.8    RDW      11.0 - 15.0 % 15.0    RDW-SD      35.1 - 46.3 fL 49.1 (H)    Platelet Count      833.3 - 450.0 10(3)uL 260.0    SED RATE      0 - 30 mm/Hr 22    C-REACTIVE PROTEIN      <0.30 mg/dL 0.31 (H)       Legend:  (H) High  (L) Low    Sil 15, 2023  Jimmie Hutchinson MD    6/15/23 12:42 PM  Note  ttalked to pt. - Gabriela Nickels to stop the lefulnomdie - and ok to increase the savella to bid - to montior the bruising   Ok to check liver tests in 2 weeks. In the future - then can try leflunomie 20mg every other day          Summary:  1. Check labs today   2. Cont. leflunomide 10mg a day - for now. 3. Cont.  savella 25mg once a day - for now - plan is to increase to 25mg twice a day in the future   4. . Cont. Tramadol 50mg a day as needed. 5.. Cont. tizandine for arm pain as needed  6. Return to clinic in 3-4 month  7.  Cont. amitrpityline 20mg at night        -consider CBD oil           Beverly Monteiro MD  6/14/2023   10:17 AM  - Reviewed [place]  through Epic

## 2023-07-28 RX ORDER — LEFLUNOMIDE 10 MG/1
TABLET ORAL
Qty: 30 TABLET | Refills: 1 | OUTPATIENT
Start: 2023-07-28

## 2023-08-04 ENCOUNTER — TELEPHONE (OUTPATIENT)
Dept: RHEUMATOLOGY | Facility: CLINIC | Age: 63
End: 2023-08-04

## 2023-08-04 NOTE — TELEPHONE ENCOUNTER
Patient is requesting a refill for the medication below. . Patient is out of [facility]    Milnacipran HCl 25 MG Oral Tab     Per patient the pharmacy has requested this refill and the quantity is not in the instructions    [facility], [address] [phone], [phone]     Fax #[phone]

## 2023-08-15 ENCOUNTER — TELEPHONE (OUTPATIENT)
Dept: RHEUMATOLOGY | Facility: CLINIC | Age: 63
End: 2023-08-15

## 2023-08-15 NOTE — TELEPHONE ENCOUNTER
Patient is requesting the refill for her medicine below be sent to her Pharmacy for #180 tablets      Milnacipran HCl 25 MG Oral Tab     PHARMACY [facility], [address] [facility] [phone], [phone]       Please call patient when this is completed, so she knows if she needs to call and order the medicine herself [phone]

## 2023-08-15 NOTE — TELEPHONE ENCOUNTER
Per pharmacy on file, need an updated script for UBRELVY It should indicated qty of 10 or 16 rand Savella qty 180. Please advise.

## 2023-08-15 NOTE — TELEPHONE ENCOUNTER
Spoke to [place] at [facility]. She reports an application for assistance  was sent in on 7/28/23 for Ubrelvy and Kwaxuma but the [facility] [place]. provider portion of application was incomplete. ..quantity missing. Application completed by Dr Rafa Martin at [facility]. [place] will contact her team to look into why she does not see record of script electronically sent by Dr David Antunez office on 8/4/23. Then phoned pt and informed her of the above information. Recommended the pt choose whether Dr Jeffery Headley or other healthcare provider will be prescribing Kwaxuma. Pt wants Dr Jose Ramon Luu and states she will drop-off an application for assistance to the [employer] office on 8/16/23 for completion and submission.

## 2023-08-15 NOTE — TELEPHONE ENCOUNTER
Office does not prescribe Ubrevly. See other encounter dated today for refill request regarding Melanie Brown.

## 2023-08-16 NOTE — TELEPHONE ENCOUNTER
Application for Savella completed and placed on provider's desk for signature. Will fax when completed.

## 2023-08-22 PROBLEM — C7A.00 MALIGNANT CARCINOID TUMOR (HCC): Status: ACTIVE | Noted: 2020-06-17

## 2023-08-22 PROBLEM — B35.1 ONYCHOMYCOSIS DUE TO DERMATOPHYTE: Status: ACTIVE | Noted: 2020-06-17

## 2023-09-18 ENCOUNTER — OFFICE VISIT (OUTPATIENT)
Dept: RHEUMATOLOGY | Facility: CLINIC | Age: 63
End: 2023-09-18

## 2023-09-18 ENCOUNTER — LAB ENCOUNTER (OUTPATIENT)
Dept: LAB | Facility: HOSPITAL | Age: 63
End: 2023-09-18
Attending: INTERNAL MEDICINE
Payer: MEDICARE

## 2023-09-18 VITALS
HEIGHT: 61 IN | RESPIRATION RATE: 16 BRPM | HEART RATE: 98 BPM | DIASTOLIC BLOOD PRESSURE: 65 MMHG | BODY MASS INDEX: 35.57 KG/M2 | SYSTOLIC BLOOD PRESSURE: 105 MMHG | WEIGHT: 188.38 LBS

## 2023-09-18 DIAGNOSIS — M06.4 UNDIFFERENTIATED INFLAMMATORY ARTHRITIS (HCC): ICD-10-CM

## 2023-09-18 DIAGNOSIS — M79.7 FIBROMYALGIA: ICD-10-CM

## 2023-09-18 DIAGNOSIS — M06.4 UNDIFFERENTIATED INFLAMMATORY ARTHRITIS (HCC): Primary | ICD-10-CM

## 2023-09-18 DIAGNOSIS — Z51.81 THERAPEUTIC DRUG MONITORING: ICD-10-CM

## 2023-09-18 LAB
ALT SERPL-CCNC: 19 U/L
AST SERPL-CCNC: 14 U/L (ref 15–37)
CREAT BLD-MCNC: 0.87 MG/DL
CRP SERPL-MCNC: <0.29 MG/DL (ref ?–0.3)
DEPRECATED RDW RBC AUTO: 46.5 FL (ref 35.1–46.3)
EGFRCR SERPLBLD CKD-EPI 2021: 74 ML/MIN/1.73M2 (ref 60–?)
ERYTHROCYTE [DISTWIDTH] IN BLOOD BY AUTOMATED COUNT: 14 % (ref 11–15)
ERYTHROCYTE [SEDIMENTATION RATE] IN BLOOD: 29 MM/HR
HCT VFR BLD AUTO: 40 %
HGB BLD-MCNC: 12.6 G/DL
MCH RBC QN AUTO: 28.4 PG (ref 26–34)
MCHC RBC AUTO-ENTMCNC: 31.5 G/DL (ref 31–37)
MCV RBC AUTO: 90.1 FL
PLATELET # BLD AUTO: 248 10(3)UL (ref 150–450)
RBC # BLD AUTO: 4.44 X10(6)UL
WBC # BLD AUTO: 9.6 X10(3) UL (ref 4–11)

## 2023-09-18 PROCEDURE — 3078F DIAST BP <80 MM HG: CPT | Performed by: INTERNAL MEDICINE

## 2023-09-18 PROCEDURE — 82565 ASSAY OF CREATININE: CPT

## 2023-09-18 PROCEDURE — 3008F BODY MASS INDEX DOCD: CPT | Performed by: INTERNAL MEDICINE

## 2023-09-18 PROCEDURE — 3074F SYST BP LT 130 MM HG: CPT | Performed by: INTERNAL MEDICINE

## 2023-09-18 PROCEDURE — 84450 TRANSFERASE (AST) (SGOT): CPT

## 2023-09-18 PROCEDURE — 85652 RBC SED RATE AUTOMATED: CPT

## 2023-09-18 PROCEDURE — 36415 COLL VENOUS BLD VENIPUNCTURE: CPT

## 2023-09-18 PROCEDURE — 86140 C-REACTIVE PROTEIN: CPT

## 2023-09-18 PROCEDURE — 85027 COMPLETE CBC AUTOMATED: CPT

## 2023-09-18 PROCEDURE — 84460 ALANINE AMINO (ALT) (SGPT): CPT

## 2023-09-18 PROCEDURE — 99214 OFFICE O/P EST MOD 30 MIN: CPT | Performed by: INTERNAL MEDICINE

## 2023-09-18 RX ORDER — LEFLUNOMIDE 10 MG/1
10 TABLET ORAL EVERY OTHER DAY
Qty: 45 TABLET | Refills: 1 | Status: SHIPPED | OUTPATIENT
Start: 2023-09-18

## 2023-09-18 NOTE — PATIENT INSTRUCTIONS
1 Check labs today   2. Cont. leflunomide 10mg every other day - for now. 3. Cont. Savella 25mg twice a day in the future   4. . Cont. Tramadol 50mg a day as needed. 5.. Cont. tizandine for arm pain as needed  6. Return to clinic in 3-4 month  7.  Cont. amitrpityline 20mg at night

## 2023-10-23 ENCOUNTER — OFFICE VISIT (OUTPATIENT)
Dept: NEUROLOGY | Facility: CLINIC | Age: 63
End: 2023-10-23
Payer: MEDICARE

## 2023-10-23 DIAGNOSIS — G43.009 MIGRAINE WITHOUT AURA AND WITHOUT STATUS MIGRAINOSUS, NOT INTRACTABLE: Primary | ICD-10-CM

## 2023-10-23 DIAGNOSIS — M54.12 CERVICAL RADICULOPATHY: ICD-10-CM

## 2023-10-23 PROCEDURE — 99213 OFFICE O/P EST LOW 20 MIN: CPT | Performed by: OTHER

## 2023-10-23 RX ORDER — AMITRIPTYLINE HYDROCHLORIDE 25 MG/1
25 TABLET, FILM COATED ORAL NIGHTLY
Qty: 90 TABLET | Refills: 3 | Status: SHIPPED | OUTPATIENT
Start: 2023-10-23

## 2023-10-23 RX ORDER — RIZATRIPTAN BENZOATE 10 MG/1
10 TABLET ORAL DAILY
Qty: 9 TABLET | Refills: 11 | Status: SHIPPED | OUTPATIENT
Start: 2023-10-23

## 2023-10-23 RX ORDER — FREMANEZUMAB-VFRM 225 MG/1.5ML
225 INJECTION SUBCUTANEOUS
Qty: 3 EACH | Refills: 3 | Status: SHIPPED | OUTPATIENT
Start: 2023-10-23

## 2023-10-23 NOTE — PROGRESS NOTES
Neurology follow-up visit     Referred By: Dr. Gutiérrez ref. provider found    Chief Complaint: Patient presents with:  Migraine: Pt presents today for migraines without aura. No change in migraines. Reports about 4 migraines per month. Denies nausea/vomiting. Weather is a trigger. Pt would like to increase amitriptyline. She is waking up at 3 am & feels amitriptyline increase will help her sleep better      HPI:     Prem Dominguez is a 58year old female, who presents for trouble with walking, pain, headaches. Apparently patient has had for a number of years problems with her waking up in the morning and feeling very stiff in her feet and ankles, it takes 2 hours for her to start moving and feeling better. But also if she spends a lot of time on her feet it hurts especially in her right knee. She has been seen by orthopedic specialist, apparently no obvious problem with the knee itself was found. She also finds herself limping all the time on the right side. No bladder control issues. Also some numbness in her feet but more so pain in her feet. She had an EMG of her right lower extremities done, slightly decreased amplitude of the superficial peroneal nerve and may be his mom decreased recruitment in the iliopsoas muscle and therefore femoral and superficial peroneal neuropathy were considered but they were quite mild. Also patient had history of migraines. She is been seen by multiple neurologists in the past, she remembers being on Topamax, she does not go back on it. She was on Lyrica and gabapentin in the past for pain in her feet. She was also prescribed Seroquel by her rheumatologist apparently, was unclear the reason. She did have MRI of the lumbar spine done that showed some degenerative changes but relatively mild in nature. She was taking tramadol, diclofenac and some other pain medications on a regular basis.     For migraines she was most recently prescribed Ajovy her gynecologist, and with that and Maxalt she was able to manage her migraines much better, she was getting them maybe twice a month and able to abort them very quickly with Maxalt.    - MRI BRAIN (CPT=70551); Future  - MRI SPINE CERVICAL (CPT=72141); Future  - MRI SPINE THORACIC (CPT=72146); Future  - JENNY,DIRECT,REFLEX TITER + SPECIFIC ANTIBODIES; Future  - ANTICARDIOLIPIN AB, IGA, QN  - ANTICARDIOLIPIN AB, IGG, QN  - ANTICARDIOLIPIN AB, IGM, QN  - CBC WITH DIFFERENTIAL WITH PLATELET  - IMMUNOFIXATION [ [place] Emiliano; Future  - HOMOCYSTEINE; Future  - MPO/AZ-3 ANCA ANTIBODIES; Future  - NEUTROPHIL ASSOCIATED AB; Future  - PARANEOPLASTIC AUTOAB EVAL; Future  - RHEUMATOID ARTHRITIS FACTOR  - VITAMIN B12  - CELIAC DISEASE AB EXPANDED PNL; Future  - LYME DISEASE, TOTAL AB W RFLX; Future    Testing was done, she was positive for both Lyme disease and some antibodies for celiac disease were positive from the panel. Patient followed up with GI doctor, she is scheduled for EGD. It seems that most likely she will require a gluten-free diet. She also follow-up with infectious disease doctor and apparently was confirmed that she did have Lyme disease and that she needs to be on antibiotics. In the meantime gabapentin was somewhat helpful for her pain in her feet and legs and no side effects. Ajovy seems to have been helpful to decrease frequency of migraines to about 2 or 3 times a month, and was able to abort them easily with Maxalt     Follow-up in June 2020 she was reporting new development of few weeks pain shooting down from her left shoulder into her left upper arm. Still having symptoms in her legs when she wakes up especially she was at that point on 300 milligrams of gabapentin 3 times a day. At that point we increased the dose of gabapentin. Patient also was referred to physiatry and continued with physical therapy. Patient continued with Ajovy. Patient came back for follow-up in June 2021. She is still dealing with headaches. That time she was describing them coming in cluster for about a week of headache and 3 weeks of no headaches. And there was 5 days or 7 days of headache she would use the Maxalt up to twice a day. She also was complaining of worsening of quality of sleep that might have been contributing to her increased headaches. In the meantime she stopped gabapentin since it was unhelpful and causing some weight gain. Amitriptyline was added to Maxalt and Ajovy. Patient came back for follow-up in January 2022. Point patient also was seen at [facility], diagnosed with fibro myalgia, was placed on Savella. That was somewhat helpful. However with her new insurance, Medicare, it was not covering neither [address] [place] [address] nor Ajovy. Patient continues with that treatment and came back for follow-up in October 2023. Headaches were still relatively well controlled, but her sleep was getting worse. She was interested in trying to go up on the dose of amitriptyline to 25 mg.   Also patient was reporting and some years ago should had cervical injections for her cervical radiculopathy at [facility] but wanted to establish with somebody locally to consider continuing with treatment        Past Medical History:   Diagnosis Date    Allergic rhinitis     Arthritis     Asthma     High blood pressure     Obesity     PONV (postoperative nausea and vomiting)     Sleep apnea     CPAP PRESCRIBED - PT DOES NOT USE D/T CLAUSTROPHOBIA       Past Surgical History:   Procedure Laterality Date    CARPAL TUNNEL RELEASE Bilateral     COLONOSCOPY  2017    At outside facility reported as normal per patient    FOOT SURGERY Right 12/2021    KNEE REPLACEMENT SURGERY      KNEE SURGERY Right     LAPS GSTR RSTCV PX PLMT BAND      SINUS SURGERY        TUBAL LIGATION         Social history:  Smoking status:   Never      Smokeless tobacco:   Never       Alcohol use   Not Currently       Drug use:   Unknown         Family History   Problem Relation Age of Onset Cancer Mother         breast ca         Current Outpatient Medications:     Insulin Pen Needle (PEN NEEDLES) 32G X 4 MM Does not apply Misc, 1 each every 7 days. , Disp: 30 each, Rfl: 1    leflunomide 10 MG Oral Tab, Take 1 tablet (10 mg total) by mouth every other day., Disp: 45 tablet, Rfl: 1    furosemide 20 MG Oral Tab, Take 1 tablet (20 mg total) by mouth 2 (two) times daily. , Disp: , Rfl:     fluticasone propionate 50 MCG/ACT Nasal Suspension, 2 sprays by Nasal route daily. , Disp: , Rfl:     metoprolol succinate ER 25 MG Oral Tablet 24 Hr, Take 1 tablet (25 mg total) by mouth daily. , Disp: , Rfl:     Potassium Chloride ER 20 MEQ Oral Tab CR, Take 1 tablet by mouth daily with food. , Disp: , Rfl:     Tretinoin 0.05 % External Cream, Apply topically nightly., Disp: , Rfl:     amitriptyline 10 MG Oral Tab, Take 2 tablets (20 mg total) by mouth nightly., Disp: 180 tablet, Rfl: 1    tiZANidine 2 MG Oral Tab, Take 1 tablet (2 mg total) by mouth every 8 (eight) hours as needed. , Disp: 180 tablet, Rfl: 1    traMADol 50 MG Oral Tab, Take 1 tablet (50 mg total) by mouth daily as needed. , Disp: 30 tablet, Rfl: 5    ibuprofen 800 MG Oral Tab, , Disp: , Rfl:     Vitamin D3, Cholecalciferol, 25 MCG (1000 UT) Oral Tab, , Disp: , Rfl:     rosuvastatin 10 MG Oral Tab, Take 1 tablet (10 mg total) by mouth every evening., Disp: , Rfl:     Fremanezumab-vfrm (AJOVY) 225 MG/1.5ML Subcutaneous Solution Prefilled Syringe, Inject 225 mg into the skin every 3 (three) months., Disp: 3 each, Rfl: 3    RIZATRIPTAN BENZOATE 10 MG Oral Tab, TAKE 1 TABLET BY MOUTH EVERY DAY, Disp: 9 tablet, Rfl: 5    Levocetirizine Dihydrochloride 5 MG Oral Tab, Take 1 tablet (5 mg total) by mouth once as needed. , Disp: , Rfl:     Montelukast Sodium 10 MG Oral Tab, Take 1 tablet (10 mg total) by mouth daily. , Disp: , Rfl: 2    PROAIR  (90 Base) MCG/ACT Inhalation Aero Soln, USE 1-2 PUFFS EVERY 4-6 HOURS AS NEEDED FOR SHORTNESS OF BREATH.  AND 15-30 MIN BEFORE STRENOUS ACTIV, Disp: , Rfl: 2    EPINEPHrine 0.3 MG/0.3ML Injection Solution Auto-injector, Inject 0.3 mL (1 each total) into the muscle., Disp: , Rfl:     semaglutide (OZEMPIC, 1 MG/DOSE,) 4 MG/3ML Subcutaneous Solution Pen-injector, Inject 1 mg into the skin every 7 days. (Patient not taking: Reported on 10/23/2023), Disp: 3 mL, Rfl: 0    metFORMIN 500 MG Oral Tab, Take 1 tablet (500 mg total) by mouth daily with breakfast. (Patient not taking: Reported on 10/23/2023), Disp: 90 tablet, Rfl: 0    Milnacipran HCl 25 MG Oral Tab, Take 25 mg by mouth in the morning and 25 mg before bedtime. , Disp: 180 tablet, Rfl: 3      Bee Venom               ANAPHYLAXIS, HIVES, RASH, SHORTNESS                           OF BREATH, Tightness in Chest,                            WHEEZING  Egg                     NAUSEA AND VOMITING, SHORTNESS OF                            BREATH, WHEEZING  Iodine (Topical)        PALPITATIONS  Shrimp                  ANAPHYLAXIS, TONGUE SWELLING  Cefuroxime              RASH    ROS:   As in HPI, the rest of the 14 system review was done and was negative      Physical Exam:  There were no vitals filed for this visit. General: No apparent distress, well nourished, well groomed. Head- Normocephalic, atraumatic  Eyes- No redness or swelling  ENT- Hearing intake, normal glutition  Neck- No masses or adenopathy    Neurological:     Mental Status- Alert and oriented x3. Normal attention span and concentration  Thought process intact  Memory intact- recent and remote  Mood intact  Fund of knowledge appropriate for education and age    Language intact including: comprehension, naming, repetition, vocabulary    Cranial Nerves:    VII. Face symmetric, no facial weakness  VIII. Hearing intact to whisper. IX. Pallet elevates symmetrically. XI. Shoulder shrug is intact  XII.  Tongue is midline    Motor Exam:  Muscle tone normal  No atrophy or fasciculations  Strength- upper extremities 5/5 proximally and distally  Rapid alternating movements intact    Gait:  Limping right side gait, possibly antalgic    Labs:    No results found for: \"TSH\"  No results found for: \"CHOL\", \"HDL\", \"LDL\", \"TRIG\"  Lab Results   Component Value Date    HGB 12.6 09/18/2023    HCT 40.0 09/18/2023    MCV 90.1 09/18/2023    WBC 9.6 09/18/2023    .0 09/18/2023      Lab Results   Component Value Date    BUN 10 06/14/2023    CA 9.0 06/14/2023    ALT 19 09/18/2023    AST 14 (L) 09/18/2023    ALB 3.0 (L) 06/14/2023     06/14/2023    K 3.7 06/14/2023     06/14/2023    CO2 29.0 06/14/2023      I have reviewed labs. Imaging Studies:  I have independently reviewed imaging. I have reviewed the EMG tracings independently, as above    MRI of the brain, cervical and thoracic spines were reviewed, some mild degenerative changes and nonspecific white matter changes noted but no obvious explanation for her symptoms. Assessment   1. Paresthesia  Continue with amitriptyline. Dose will be increased to help with sleep, will continue with 25 mg at this time. 2. Cervical radiculopathy   Patient will referred to a physiatrist.    3. Migraine without aura and without status migrainosus, not intractable  Better controlled  Patient will be continued with Ajovy and Maxalt, she can only tolerate small dose of amitriptyline, this time she was interested in going up on the dose. She has already tried propranolol, Depakote Topamax with significant amount of side effects           Education and counseling provided to patient. Instructed patient to call my office or seek medical attention immediately if symptoms worsen. Patient verbalized understanding of information given. All questions were answered. All side effects of drugs were discussed. Return to clinic in: No follow-ups on file.     Marianna Soares MD

## 2023-10-31 ENCOUNTER — LAB ENCOUNTER (OUTPATIENT)
Dept: LAB | Age: 63
End: 2023-10-31
Attending: PODIATRIST
Payer: MEDICARE

## 2023-10-31 DIAGNOSIS — Z01.811 PRE-OP CHEST EXAM: ICD-10-CM

## 2023-10-31 DIAGNOSIS — Z51.81 THERAPEUTIC DRUG MONITORING: ICD-10-CM

## 2023-10-31 DIAGNOSIS — M06.4 UNDIFFERENTIATED INFLAMMATORY ARTHRITIS (HCC): ICD-10-CM

## 2023-10-31 DIAGNOSIS — Z01.818 PRE-OP EXAM: Primary | ICD-10-CM

## 2023-10-31 LAB
ALT SERPL-CCNC: 9 U/L
ANION GAP SERPL CALC-SCNC: 7 MMOL/L (ref 0–18)
AST SERPL-CCNC: 18 U/L (ref ?–34)
BASOPHILS # BLD AUTO: 0.07 X10(3) UL (ref 0–0.2)
BASOPHILS NFR BLD AUTO: 1 %
BILIRUB UR QL: NEGATIVE
BUN BLD-MCNC: 7 MG/DL (ref 9–23)
BUN/CREAT SERPL: 8.1 (ref 10–20)
CALCIUM BLD-MCNC: 9.4 MG/DL (ref 8.7–10.4)
CHLORIDE SERPL-SCNC: 107 MMOL/L (ref 98–112)
CO2 SERPL-SCNC: 28 MMOL/L (ref 21–32)
COLOR UR: YELLOW
CREAT BLD-MCNC: 0.86 MG/DL
CRP SERPL-MCNC: <0.4 MG/DL (ref ?–1)
DEPRECATED RDW RBC AUTO: 46.4 FL (ref 35.1–46.3)
EGFRCR SERPLBLD CKD-EPI 2021: 75 ML/MIN/1.73M2 (ref 60–?)
EOSINOPHIL # BLD AUTO: 0.18 X10(3) UL (ref 0–0.7)
EOSINOPHIL NFR BLD AUTO: 2.7 %
ERYTHROCYTE [DISTWIDTH] IN BLOOD BY AUTOMATED COUNT: 14.2 % (ref 11–15)
ERYTHROCYTE [SEDIMENTATION RATE] IN BLOOD: 41 MM/HR
FASTING STATUS PATIENT QL REPORTED: NO
GLUCOSE BLD-MCNC: 102 MG/DL (ref 70–99)
GLUCOSE UR-MCNC: NORMAL MG/DL
HCT VFR BLD AUTO: 39 %
HGB BLD-MCNC: 12.4 G/DL
HGB UR QL STRIP.AUTO: NEGATIVE
HYALINE CASTS #/AREA URNS AUTO: PRESENT /LPF
IMM GRANULOCYTES # BLD AUTO: 0.02 X10(3) UL (ref 0–1)
IMM GRANULOCYTES NFR BLD: 0.3 %
KETONES UR-MCNC: NEGATIVE MG/DL
LEUKOCYTE ESTERASE UR QL STRIP.AUTO: 250
LYMPHOCYTES # BLD AUTO: 2.05 X10(3) UL (ref 1–4)
LYMPHOCYTES NFR BLD AUTO: 30.5 %
MCH RBC QN AUTO: 28.4 PG (ref 26–34)
MCHC RBC AUTO-ENTMCNC: 31.8 G/DL (ref 31–37)
MCV RBC AUTO: 89.4 FL
MONOCYTES # BLD AUTO: 0.69 X10(3) UL (ref 0.1–1)
MONOCYTES NFR BLD AUTO: 10.3 %
NEUTROPHILS # BLD AUTO: 3.72 X10 (3) UL (ref 1.5–7.7)
NEUTROPHILS # BLD AUTO: 3.72 X10(3) UL (ref 1.5–7.7)
NEUTROPHILS NFR BLD AUTO: 55.2 %
NITRITE UR QL STRIP.AUTO: NEGATIVE
OSMOLALITY SERPL CALC.SUM OF ELEC: 292 MOSM/KG (ref 275–295)
PH UR: 6 [PH] (ref 5–8)
PLATELET # BLD AUTO: 220 10(3)UL (ref 150–450)
POTASSIUM SERPL-SCNC: 3.4 MMOL/L (ref 3.5–5.1)
PROT UR-MCNC: 30 MG/DL
RBC # BLD AUTO: 4.36 X10(6)UL
SODIUM SERPL-SCNC: 142 MMOL/L (ref 136–145)
SP GR UR STRIP: 1.02 (ref 1–1.03)
UROBILINOGEN UR STRIP-ACNC: 3
WBC # BLD AUTO: 6.7 X10(3) UL (ref 4–11)

## 2023-10-31 PROCEDURE — 36415 COLL VENOUS BLD VENIPUNCTURE: CPT

## 2023-10-31 PROCEDURE — 81001 URINALYSIS AUTO W/SCOPE: CPT

## 2023-10-31 PROCEDURE — 80048 BASIC METABOLIC PNL TOTAL CA: CPT

## 2023-10-31 PROCEDURE — 84450 TRANSFERASE (AST) (SGOT): CPT

## 2023-10-31 PROCEDURE — 84460 ALANINE AMINO (ALT) (SGPT): CPT

## 2023-10-31 PROCEDURE — 85025 COMPLETE CBC W/AUTO DIFF WBC: CPT

## 2023-10-31 PROCEDURE — 86140 C-REACTIVE PROTEIN: CPT

## 2023-10-31 PROCEDURE — 85652 RBC SED RATE AUTOMATED: CPT

## 2023-11-06 ENCOUNTER — TELEPHONE (OUTPATIENT)
Dept: RHEUMATOLOGY | Facility: CLINIC | Age: 63
End: 2023-11-06

## 2023-11-06 ENCOUNTER — OFFICE VISIT (OUTPATIENT)
Dept: RHEUMATOLOGY | Facility: CLINIC | Age: 63
End: 2023-11-06
Payer: MEDICARE

## 2023-11-06 VITALS
SYSTOLIC BLOOD PRESSURE: 111 MMHG | HEART RATE: 79 BPM | WEIGHT: 188 LBS | DIASTOLIC BLOOD PRESSURE: 67 MMHG | HEIGHT: 61 IN | BODY MASS INDEX: 35.5 KG/M2 | RESPIRATION RATE: 16 BRPM

## 2023-11-06 DIAGNOSIS — Z51.81 THERAPEUTIC DRUG MONITORING: ICD-10-CM

## 2023-11-06 DIAGNOSIS — M06.4 UNDIFFERENTIATED INFLAMMATORY ARTHRITIS (HCC): Primary | ICD-10-CM

## 2023-11-06 DIAGNOSIS — M79.7 FIBROMYALGIA: ICD-10-CM

## 2023-11-06 PROCEDURE — 3074F SYST BP LT 130 MM HG: CPT | Performed by: INTERNAL MEDICINE

## 2023-11-06 PROCEDURE — 3008F BODY MASS INDEX DOCD: CPT | Performed by: INTERNAL MEDICINE

## 2023-11-06 PROCEDURE — 3078F DIAST BP <80 MM HG: CPT | Performed by: INTERNAL MEDICINE

## 2023-11-06 PROCEDURE — 99214 OFFICE O/P EST MOD 30 MIN: CPT | Performed by: INTERNAL MEDICINE

## 2023-11-06 RX ORDER — FOLIC ACID 1 MG/1
1 TABLET ORAL DAILY
Qty: 90 TABLET | Refills: 3 | Status: SHIPPED | OUTPATIENT
Start: 2023-11-06 | End: 2024-11-05

## 2023-11-06 RX ORDER — METHOTREXATE 2.5 MG/1
10 TABLET ORAL WEEKLY
Qty: 20 TABLET | Refills: 3 | Status: SHIPPED | OUTPATIENT
Start: 2023-11-06 | End: 2023-12-06

## 2023-11-06 NOTE — PATIENT INSTRUCTIONS
1.  Start methotrexate 4 pills once a week and folic acid 1mg a day -   2. Stop  leflunomide  3. Cont. Savella 25mg twice a day in the future   4. . Cont. Tramadol 50mg a day as needed. 5.. Cont. tizandine for arm pain as needed  6. Return to clinic in 3-4 month  7. Cont. amitrpityline 20mg at night  8. Pt.  Assistance form for savella

## 2023-11-29 ENCOUNTER — TELEPHONE (OUTPATIENT)
Dept: RHEUMATOLOGY | Facility: CLINIC | Age: 63
End: 2023-11-29

## 2023-11-29 NOTE — TELEPHONE ENCOUNTER
Current Outpatient Medications   Medication Sig Dispense Refill    methotrexate 2.5 MG Oral Tab Take 4 tablets (10 mg total) by mouth once a week.  20 tablet 3

## 2023-12-04 ENCOUNTER — OFFICE VISIT (OUTPATIENT)
Dept: PHYSICAL MEDICINE AND REHAB | Facility: CLINIC | Age: 63
End: 2023-12-04
Payer: MEDICARE

## 2023-12-04 VITALS — OXYGEN SATURATION: 98 % | WEIGHT: 187 LBS | BODY MASS INDEX: 35 KG/M2 | HEART RATE: 99 BPM

## 2023-12-04 DIAGNOSIS — K21.00 GASTROESOPHAGEAL REFLUX DISEASE WITH ESOPHAGITIS WITHOUT HEMORRHAGE: ICD-10-CM

## 2023-12-04 DIAGNOSIS — D68.62 LUPUS ANTICOAGULANT SYNDROME (HCC): ICD-10-CM

## 2023-12-04 DIAGNOSIS — M79.7 FIBROMYALGIA: ICD-10-CM

## 2023-12-04 DIAGNOSIS — I25.10 ARTERIOSCLEROSIS OF CORONARY ARTERY: ICD-10-CM

## 2023-12-04 DIAGNOSIS — M54.12 CERVICAL RADICULOPATHY: Primary | ICD-10-CM

## 2023-12-04 DIAGNOSIS — G56.03 BILATERAL CARPAL TUNNEL SYNDROME: ICD-10-CM

## 2023-12-21 RX ORDER — TIZANIDINE 2 MG/1
2 TABLET ORAL EVERY 8 HOURS PRN
Qty: 180 TABLET | Refills: 1 | Status: SHIPPED | OUTPATIENT
Start: 2023-12-21

## 2023-12-21 NOTE — TELEPHONE ENCOUNTER
LOV: 11/6/23  Last Refilled:#180, 1rf 6/14/23    Future Appointments   Date Time Provider Kelly Saldaña   12/27/2023  4:20 PM Brianna Pham MD [facility] THE [facility]   1/8/2024  1:00 PM Alina Bearden MD [facility]  [facility]   1/9/2024 12:00 PM Zechariah Cotton MD [facility]   1/22/2024 11:00 AM Pavithra Magaña MD [address] [place] [address] OF THE [facility]   2/5/2024  4:00 PM [address] [place] [address] MRI RM2 (3T WIDE) [address] [place] [address] MRI EM Main [place]     Summary:  1. Start methotrexate 4 pills once a week and folic acid 1mg a day -   2. Stop  leflunomide  3. Cont. Savella 25mg twice a day in the future   4. . Cont. Tramadol 50mg a day as needed. 5.. Cont. tizandine for arm pain as needed  6. Return to clinic in 3-4 month  7. Cont. amitrpityline 20mg at night  8. Pt. Assistance form for savella         -consider CBD oil           Gina Cleveland MD  11/6/2023   4:05 PM  - Reviewed [place]- information  through Epic                     Please advise.

## 2023-12-27 ENCOUNTER — OFFICE VISIT (OUTPATIENT)
Facility: CLINIC | Age: 63
End: 2023-12-27
Payer: MEDICARE

## 2023-12-27 VITALS
SYSTOLIC BLOOD PRESSURE: 130 MMHG | BODY MASS INDEX: 35.3 KG/M2 | HEIGHT: 61 IN | DIASTOLIC BLOOD PRESSURE: 84 MMHG | WEIGHT: 187 LBS | HEART RATE: 99 BPM

## 2023-12-27 DIAGNOSIS — R12 HEARTBURN: Primary | ICD-10-CM

## 2023-12-27 PROCEDURE — 99213 OFFICE O/P EST LOW 20 MIN: CPT | Performed by: INTERNAL MEDICINE

## 2023-12-27 PROCEDURE — 3008F BODY MASS INDEX DOCD: CPT | Performed by: INTERNAL MEDICINE

## 2023-12-27 PROCEDURE — 3079F DIAST BP 80-89 MM HG: CPT | Performed by: INTERNAL MEDICINE

## 2023-12-27 PROCEDURE — 3075F SYST BP GE 130 - 139MM HG: CPT | Performed by: INTERNAL MEDICINE

## 2023-12-27 RX ORDER — FAMOTIDINE 20 MG/1
20 TABLET, FILM COATED ORAL NIGHTLY
Qty: 30 TABLET | Refills: 3 | Status: SHIPPED | OUTPATIENT
Start: 2023-12-27

## 2023-12-28 NOTE — PROGRESS NOTES
Subjective:   Patient ID: Garret Bueno is a 58year old female. HPI  The patient returns in follow-up. She was last seen in May 2023. As per previous notes the patient has a family history of colon cancer in her mother over the age of 61 and a personal history of adenomatous colon polyps. The patient's last colonoscopy in December 2022 revealed #2 adenomatous polyps including a 1 cm adenoma and uncomplicated sigmoid colon diverticulosis. A 3-year surveillance colonoscopy was advised. 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 She has had gastroesophageal reflux symptoms. Endoscopy in 2020 and December 2022 revealed surgical changes post the gastroplasty and stromal tumor excision and Candida esophagitis. The patient was treated with fluconazole. In March 2023 the patient had recurrent symptoms of heartburn. She was advised to continue taking pantoprazole twice daily. Famotidine was added at bedtime. An esophagram was ordered which revealed a mildly dilated distal esophagus and hyperperistalsis of the esophagus with to and fro motion of contrast, retained food material and secretions distally and a high-grade \"stomal stenosis\". The patient was advised to see her bariatric surgeon regarding having fluid removed from the band or having the band removed. The patient visited her bariatric surgeon who withdrew fluid from the band reservoir. Previous notes allude to no improvement in symptoms. At the time of the patient's last visit the possibility of a coexistent pill induced esophagitis secondary to clindamycin. Possibility of band removal was discussed. Repeat upper endoscopy was to be entertained if the symptoms continued.      Current history:  The patient states that her symptoms had improved, however, over the past \"couple of weeks\" she has noted recurrent heartburn which \"wakes me up\" typically at 3-4 AM.  The patient can experience heartburn during the day which is less intense. The patient believes she is only taking 20 mg once daily of pantoprazole in the morning and Zantac OTC at bedtime. The patient denies dysphagia. She was prescribed semaglutide about 2 months prior which she felt may be exacerbating symptoms. She took her last dose 3 weeks prior and has yet to notice improvement. The patient takes amitriptyline at bedtime. History/Other:   Review of Systems  See above    Wt Readings from Last 8 Encounters:   12/27/23 187 lb (84.8 kg)   12/04/23 187 lb (84.8 kg)   11/06/23 188 lb (85.3 kg)   09/19/23 186 lb (84.4 kg)   09/18/23 188 lb 6.4 oz (85.5 kg)   08/22/23 189 lb (85.7 kg)   07/27/23 188 lb (85.3 kg)   06/29/23 192 lb (87.1 kg)       Current Outpatient Medications   Medication Sig Dispense Refill    famotidine (ZANTAC 360 MAX ST) 20 MG Oral Tab Take 1 tablet (20 mg total) by mouth at bedtime. 30 tablet 3    tiZANidine 2 MG Oral Tab Take 1 tablet (2 mg total) by mouth every 8 (eight) hours as needed. 331 tablet 1    folic acid 1 MG Oral Tab Take 1 tablet (1 mg total) by mouth daily. 90 tablet 3    Fremanezumab-vfrm (AJOVY) 225 MG/1.5ML Subcutaneous Solution Prefilled Syringe Inject 225 mg into the skin every 3 (three) months. 3 each 3    RIZATRIPTAN BENZOATE 10 MG Oral Tab Take 1 tablet (10 mg total) by mouth daily. 9 tablet 11    amitriptyline 25 MG Oral Tab Take 1 tablet (25 mg total) by mouth nightly. 90 tablet 3    semaglutide (OZEMPIC, 1 MG/DOSE,) 4 MG/3ML Subcutaneous Solution Pen-injector Inject 1 mg into the skin every 7 days. 3 mL 0    Insulin Pen Needle (PEN NEEDLES) 32G X 4 MM Does not apply Misc 1 each every 7 days. 30 each 1    leflunomide 10 MG Oral Tab Take 1 tablet (10 mg total) by mouth every other day. 45 tablet 1    Milnacipran HCl 25 MG Oral Tab Take 25 mg by mouth in the morning and 25 mg before bedtime.  180 tablet 3    furosemide 20 MG Oral Tab Take 1 tablet (20 mg total) by mouth 2 (two) times daily. fluticasone propionate 50 MCG/ACT Nasal Suspension 2 sprays by Nasal route daily. metoprolol succinate ER 25 MG Oral Tablet 24 Hr Take 1 tablet (25 mg total) by mouth daily. Potassium Chloride ER 20 MEQ Oral Tab CR Take 1 tablet by mouth daily with food. Tretinoin 0.05 % External Cream Apply topically nightly. traMADol 50 MG Oral Tab Take 1 tablet (50 mg total) by mouth daily as needed. 30 tablet 5    ibuprofen 800 MG Oral Tab       Vitamin D3, Cholecalciferol, 25 MCG (1000 UT) Oral Tab       rosuvastatin 10 MG Oral Tab Take 1 tablet (10 mg total) by mouth every evening. Levocetirizine Dihydrochloride 5 MG Oral Tab Take 1 tablet (5 mg total) by mouth once as needed. Montelukast Sodium 10 MG Oral Tab Take 1 tablet (10 mg total) by mouth daily. 2    PROAIR  (90 Base) MCG/ACT Inhalation Aero Soln USE 1-2 PUFFS EVERY 4-6 HOURS AS NEEDED FOR SHORTNESS OF BREATH. AND 15-30 MIN BEFORE STRENOUS ACTIV  2    EPINEPHrine 0.3 MG/0.3ML Injection Solution Auto-injector Inject 0.3 mL (1 each total) into the muscle. metFORMIN 500 MG Oral Tab Take 1 tablet (500 mg total) by mouth daily with breakfast. (Patient not taking: Reported on 10/23/2023) 90 tablet 0     Allergies: Allergies   Allergen Reactions    Bee Venom ANAPHYLAXIS, HIVES, RASH, SHORTNESS OF BREATH, Tightness in Chest and WHEEZING    Egg NAUSEA AND VOMITING, SHORTNESS OF BREATH and WHEEZING    Iodine (Topical) PALPITATIONS    Shrimp ANAPHYLAXIS and TONGUE SWELLING    Cefuroxime RASH       Objective:   Physical Exam  Vitals and nursing note reviewed. Constitutional:       General: She is not in acute distress. Appearance: She is well-developed. She is not ill-appearing, toxic-appearing or diaphoretic. HENT:      Head: Normocephalic and atraumatic. Mouth/Throat:      Pharynx: No oropharyngeal exudate. Eyes:      General: No scleral icterus.      Conjunctiva/sclera: Conjunctivae normal.   Neck:      Thyroid: No thyromegaly. Cardiovascular:      Rate and Rhythm: Normal rate and regular rhythm. Heart sounds: Normal heart sounds. Pulmonary:      Effort: Pulmonary effort is normal. No respiratory distress. Breath sounds: Normal breath sounds. No wheezing or rales. Abdominal:      General: Bowel sounds are normal. There is no distension. Palpations: Abdomen is soft. There is no mass. Tenderness: There is no abdominal tenderness. There is no guarding or rebound. Musculoskeletal:      Cervical back: Neck supple. Lymphadenopathy:      Cervical: No cervical adenopathy. Neurological:      Mental Status: She is alert and oriented to person, place, and time. Psychiatric:         Behavior: Behavior normal.         Assessment & Plan:   1. Heartburn    The patient has a history of a remote history of a vertical banded gastroplasty. She has chronic gastroesophageal reflux symptoms. Previous workup and findings as above. The patient's symptoms had improved, however, heartburn has recurred. Recent use of Ozempic is noted and this could have been a contributing factor. Hopefully the effects of the Ozempic will resolve shortly. I doubt that the symptoms are due to Candida esophagitis. Even though fluid was removed from the band reservoir, a restrictive band could be playing a role. The patient is taking 20 mg daily of pantoprazole. I am recommending that she increase the dose to 20 mg twice daily before breakfast and supper. She will take 20 mg of Zantac (famotidine) at bedtime. She will contact me with a symptom update. If her symptoms continue despite the above I would consider repeating the endoscopy and/or esophagram.    2.  Family history of colon cancer/personal history of adenomatous colon polyps  Up-to-date with colonoscopic screening/surveillance. The patient would be due for a colonoscopy in December 2025.         Meds This Visit:  Requested Prescriptions     Signed Prescriptions Disp Refills    famotidine (ZANTAC 360 MAX ST) 20 MG Oral Tab 30 tablet 3     Sig: Take 1 tablet (20 mg total) by mouth at bedtime.        Imaging & Referrals:  None

## 2023-12-28 NOTE — PATIENT INSTRUCTIONS
1.  Increase the pantoprazole to twice daily before breakfast and supper. 2.  Zantac at bedtime. 3.  Please contact me with a symptom update.

## 2024-01-08 ENCOUNTER — PROCEDURE VISIT (OUTPATIENT)
Dept: PHYSICAL MEDICINE AND REHAB | Facility: CLINIC | Age: 64
End: 2024-01-08
Payer: MEDICARE

## 2024-01-08 DIAGNOSIS — G56.03 BILATERAL CARPAL TUNNEL SYNDROME: Primary | ICD-10-CM

## 2024-01-08 PROCEDURE — 95910 NRV CNDJ TEST 7-8 STUDIES: CPT | Performed by: PHYSICAL MEDICINE & REHABILITATION

## 2024-01-08 PROCEDURE — 95886 MUSC TEST DONE W/N TEST COMP: CPT | Performed by: PHYSICAL MEDICINE & REHABILITATION

## 2024-01-08 NOTE — PROGRESS NOTES
[facility]  Electromyography Consultation      History of Present Illness:    I had the opportunity to see Sarahi Jones for electrodiagnostic consultation today. The patient is a 63 year old female with a chief complaint of left greater than right hand numbness and tingling for a few months without injury.  She returns today for an EMG.  She has a history of carpal tunnel release 25 years ago by Dr. Galvan.  The symptoms impact her ability to write.  She denies a hx of diabetes, thyroid issues or daily alcohol.  Differential diagnosis is carpal tunnel syndrome, cervical radiculopathy and polyneuropathy.  At her last visit I ordered a cervical MRI but she has not had it done yet.  She had an EMG of the lower extremities by Dr. Lane on 2/10/2020 showing mild polyneuropathy, sensory greater than motor.      PAST MEDICAL HISTORY:  Past Medical History:   Diagnosis Date    Allergic rhinitis     Arthritis     Asthma     Fibromyalgia 2020    High blood pressure     Hypercholesterolemia 01/30/2005    Migraines 1970    Obesity     PONV (postoperative nausea and vomiting)     Sleep apnea     CPAP PRESCRIBED - PT DOES NOT USE D/T CLAUSTROPHOBIA       SURGICAL HISTORY:  Past Surgical History:   Procedure Laterality Date    CARPAL TUNNEL RELEASE Bilateral     COLONOSCOPY  2017    At outside facility reported as normal per patient    FOOT SURGERY Right 12/2021    KNEE REPLACEMENT SURGERY      KNEE SURGERY Right     LAPS GSTR RSTCV PX PLMT BAND      SINUS SURGERY        TUBAL LIGATION         SOCIAL HISTORY:   Social History     Occupational History    Not on file   Tobacco Use    Smoking status: Never    Smokeless tobacco: Never    Tobacco comments:     None   Vaping Use    Vaping Use: Never used   Substance and Sexual Activity    Alcohol use: Never    Drug use: Never    Sexual activity: Not on file       FAMILY HISTORY:   Family History   Problem Relation Age of Onset     Cancer Mother         breast ca    Migraines Father        CURRENT MEDICATIONS:   Current Outpatient Medications   Medication Sig Dispense Refill    famotidine (ZANTAC 360 MAX ST) 20 MG Oral Tab Take 1 tablet (20 mg total) by mouth at bedtime. 30 tablet 3    tiZANidine 2 MG Oral Tab Take 1 tablet (2 mg total) by mouth every 8 (eight) hours as needed. 180 tablet 1    folic acid 1 MG Oral Tab Take 1 tablet (1 mg total) by mouth daily. 90 tablet 3    Fremanezumab-vfrm (AJOVY) 225 MG/1.5ML Subcutaneous Solution Prefilled Syringe Inject 225 mg into the skin every 3 (three) months. 3 each 3    RIZATRIPTAN BENZOATE 10 MG Oral Tab Take 1 tablet (10 mg total) by mouth daily. 9 tablet 11    amitriptyline 25 MG Oral Tab Take 1 tablet (25 mg total) by mouth nightly. 90 tablet 3    semaglutide (OZEMPIC, 1 MG/DOSE,) 4 MG/3ML Subcutaneous Solution Pen-injector Inject 1 mg into the skin every 7 days. 3 mL 0    Insulin Pen Needle (PEN NEEDLES) 32G X 4 MM Does not apply Misc 1 each every 7 days. 30 each 1    metFORMIN 500 MG Oral Tab Take 1 tablet (500 mg total) by mouth daily with breakfast. (Patient not taking: Reported on 10/23/2023) 90 tablet 0    leflunomide 10 MG Oral Tab Take 1 tablet (10 mg total) by mouth every other day. 45 tablet 1    Milnacipran HCl 25 MG Oral Tab Take 25 mg by mouth in the morning and 25 mg before bedtime. 180 tablet 3    furosemide 20 MG Oral Tab Take 1 tablet (20 mg total) by mouth 2 (two) times daily.      fluticasone propionate 50 MCG/ACT Nasal Suspension 2 sprays by Nasal route daily.      metoprolol succinate ER 25 MG Oral Tablet 24 Hr Take 1 tablet (25 mg total) by mouth daily.      Potassium Chloride ER 20 MEQ Oral Tab CR Take 1 tablet by mouth daily with food.      Tretinoin 0.05 % External Cream Apply topically nightly.      traMADol 50 MG Oral Tab Take 1 tablet (50 mg total) by mouth daily as needed. 30 tablet 5    ibuprofen 800 MG Oral Tab       Vitamin D3, Cholecalciferol, 25 MCG (1000 UT)  Oral Tab       rosuvastatin 10 MG Oral Tab Take 1 tablet (10 mg total) by mouth every evening.      Levocetirizine Dihydrochloride 5 MG Oral Tab Take 1 tablet (5 mg total) by mouth once as needed.      Montelukast Sodium 10 MG Oral Tab Take 1 tablet (10 mg total) by mouth daily.  2    PROAIR  (90 Base) MCG/ACT Inhalation Aero Soln USE 1-2 PUFFS EVERY 4-6 HOURS AS NEEDED FOR SHORTNESS OF BREATH. AND 15-30 MIN BEFORE STRENOUS ACTIV  2    EPINEPHrine 0.3 MG/0.3ML Injection Solution Auto-injector Inject 0.3 mL (1 each total) into the muscle.         PHYSICAL EXAM:   There were no vitals taken for this visit.    There is no height or weight on file to calculate BMI.      General: No immediate distress  Head: Normocephalic/ Atraumatic  Extremities: No upper extremity edema bilaterally. Peripheral pulses intact.  Bilateral carpal tunnel release scars  Spine:  full and painfree cervical ROM in all directions  Shoulders: full and painfree ROM   Neuro:   Cognition: alert & oriented x 3, attentive, able to follow 2 step commands, comprehention intact, spontaneous speech intact  Strength: Upper extremities have 5/5 strength  Sensation: Normal upper extremities  Reflexes: Normal upper extremities  Spurling's sign: neg    EMG/NCV  Sensory Nerve Conduction Study       Nerve / Sites Peak Lat Amp Segments Distance    ms µV  cm   R Median - Dig III (Antidromic)      Wrist 3.7 23.4 Wrist - Dig III 14      Palm 1.9 17.1 Palm - Dig III 7   L Median - Dig III (Antidromic)      Wrist 3.9 19.5 Wrist - Dig III 14      Palm 2.0 30.8 Palm - Dig III 7   R Ulnar - Dig V (Antidromic)      Wrist 3.1 22.0 Wrist - Dig V 14   L Ulnar - Dig V (Antidromic)      Wrist 3.4 22.1 Wrist - Dig V 14       Motor Nerve Conduction Study       Nerve / Sites Latency Amplitude Rel Amp Dur. Dur. Segments Distance Velocity Area Area    ms mV % ms %  cm m/s mVms %   R Median - APB      Wrist 3.8 7.3  5.8 100 Wrist - APB 8  26.1 100      Elbow 7.3 9.4 130 5.8  100 Elbow - Wrist 18 51 31.8 122   L Median - APB      Wrist 4.9 8.6  6.0 100 Wrist - APB 8  29.3 100      Elbow 8.6 8.2 95.6 6.4 107 Elbow - Wrist 19 52 28.9 98.9   R Ulnar - ADM      Wrist 2.4 13.0 100 6.9 100 Wrist - ADM 8  45.5 100      B.Elbow 5.4 12.3 94.9 7.1 104 B.Elbow - Wrist 18.5 61 47.0 103      A.Elbow 7.1 12.5 102 7.2 105 A.Elbow - B.Elbow 10 59 47.6 105   L Ulnar - ADM      Wrist 3.5 9.9 100 7.0 100 Wrist - ADM 8  36.2 100      B.Elbow 6.7 9.7 97.6 7.3 104 B.Elbow - Wrist 18.5 59 33.1 91.4      A.Elbow 8.1 10.4 107 7.0 100 A.Elbow - B.Elbow 11.5 80 34.4 95       EMG Summary Table     Spontaneous MUAP Recruitment   Muscle IA Fib PSW Fasc Comments Amp Dur. PPP Pattern   R. Deltoid N None None None None N N N N   R. Triceps brachii N None None None None N N N N   R. Biceps brachii N None None None None N N N N   R. Flexor carpi radialis N None None None None N N N N   R. First dorsal interosseous N None None None None N N N N   R. Abductor pollicis brevis N None None None None N N N N   L. Deltoid N None None None None N N N N   L. Triceps brachii N None None None None N N N N   L. Biceps brachii N None None None None N N N N   L. Flexor carpi radialis N None None None None N N N N   L. First dorsal interosseous N None None None None N N N N   L. Abductor pollicis brevis N None None None None N N N N                       Findings: Extremities were warmed with hot packs for 15 minutes prior to testing.  Sensory nerve conduction studies revealed median and ulnar nerve sensory responses within normal limits.  The median latencies were relatively delayed compared to the ipsilateral ulnar latencies (>0.5ms).  Motor nerve conduction studies revealed a prolonged left median distal latency with normal amplitudes and conduction velocity.  The right median motor response had parameters within limits of normal, but the left median distal latency was prolonged compared to the ipsilateral ulnar distal latency  (>1.0ms).  The ulnar motor studies were symmetric and normal bilaterally.  Needle EMG of the upper extremities was normal in all muscles tested.  Please refer to the table above.  Impression:  1.  Abnormal study.  2.  Electrodiagnostic evidence is consistent with bilateral median neuropathy at the wrist, left greater than right.  Pathophysiology is demyelinating involving sensory and motor fibers.  No axon loss.  3.  No electrodiagnostic evidence of cervical radiculopathy bilaterally.  4.  No electrodiagnostic evidence of brachial plexopathy bilaterally.      ASSESSMENT AND PLAN:  1. Bilateral carpal tunnel syndrome  EMG results show left greater than right carpal tunnel syndrome.  I recommended carpal tunnel splints to be worn at night.  She will return in 4 weeks.  She will also get the cervical MRI.  If no better we can talk about carpal tunnel injections versus hand therapy.  Her proximal arm symptoms are nonneurologic in origin.          Parker Santacruz M.D.  Diplomate [employer] Board of Physical Medicine and Rehabilitation

## 2024-01-12 DIAGNOSIS — G43.009 MIGRAINE WITHOUT AURA AND WITHOUT STATUS MIGRAINOSUS, NOT INTRACTABLE: ICD-10-CM

## 2024-01-12 NOTE — TELEPHONE ENCOUNTER
Requested Prescriptions     Pending Prescriptions Disp Refills    AMITRIPTYLINE 10 MG Oral Tab [Pharmacy Med Name: AMITRIPTYLINE HCL 10 MG TAB] 180 tablet 1     Sig: TAKE 2 TABLETS BY MOUTH NIGHTLY     Please advise.   Disp Refills Start End    amitriptyline 25 MG Oral Tab 90 tablet 3 10/23/2023 --    Sig - Route: Take 1 tablet (25 mg total) by mouth nightly. - Oral    Sent to pharmacy as: Amitriptyline HCl 25 MG Oral Tablet (Elavil)    E-Prescribing Status: Receipt confirmed by pharmacy (10/23/2023 11:23 AM CDT)      Associated Diagnoses    Migraine without aura and without status migrainosus, not intractable  - Primary        Pharmacy    [id number] - [place], [place] - [address] [place] [address] AT ACROSS FROM [address], [phone], [phone]     Additional Information    LF: 6/14/23 #180 TAB W/ 1 RF  LOV: 11/6/23   Future Appointments   Date Time Provider Department Center   1/22/2024 11:00 AM Shara Michelle MD [facility]   2/5/2024  4:00 PM [facility] MRI RM2 (3T WIDE) [facility] MRI EM [place]   2/13/2024 11:30 AM Parker Santacruz MD PM&R [facility]     Labs:   Component      Latest Ref Rng 10/31/2023   WBC      4.0 - 11.0 x10(3) uL 6.7    RBC      3.80 - 5.30 x10(6)uL 4.36    Hemoglobin      12.0 - 16.0 g/dL 12.4    Hematocrit      35.0 - 48.0 % 39.0    MCV      80.0 - 100.0 fL 89.4    MCH      26.0 - 34.0 pg 28.4    MCHC      31.0 - 37.0 g/dL 31.8    RDW-SD      35.1 - 46.3 fL 46.4 (H)    RDW      11.0 - 15.0 % 14.2    Platelet Count      150.0 - 450.0 10(3)uL 220.0    Prelim Neutrophil Abs      1.50 - 7.70 x10 (3) uL 3.72    Neutrophils Absolute      1.50 - 7.70 x10(3) uL 3.72    Lymphocytes Absolute      1.00 - 4.00 x10(3) uL 2.05    Monocytes Absolute      0.10 - 1.00 x10(3) uL 0.69    Eosinophils Absolute      0.00 - 0.70 x10(3) uL 0.18    Basophils Absolute      0.00 - 0.20 x10(3) uL 0.07    Immature Granulocyte Absolute      0.00 - 1.00 x10(3) uL 0.02    Neutrophils %      % 55.2     Lymphocytes %      % 30.5    Monocytes %      % 10.3    Eosinophils %      % 2.7    Basophils %      % 1.0    Immature Granulocyte %      % 0.3    Glucose      70 - 99 mg/dL 102 (H)    Sodium      136 - 145 mmol/L 142    Potassium      3.5 - 5.1 mmol/L 3.4 (L)    Chloride      98 - 112 mmol/L 107    Carbon Dioxide, Total      21.0 - 32.0 mmol/L 28.0    ANION GAP      0 - 18 mmol/L 7    BUN      9 - 23 mg/dL 7 (L)    CREATININE      0.55 - 1.02 mg/dL 0.86    BUN/CREATININE RATIO      10.0 - 20.0  8.1 (L)    CALCIUM      8.7 - 10.4 mg/dL 9.4    CALCULATED OSMOLALITY      275 - 295 mOsm/kg 292    EGFR      >=60 mL/min/1.73m2 75    Patient Fasting for BMP? No    AST (SGOT)      <=34 U/L 18    ALT (SGPT)      10 - 49 U/L 9 (L)    C-REACTIVE PROTEIN      <1.00 mg/dL <0.40    SED RATE      0 - 30 mm/Hr 41 (H)       Legend:  (H) High  (L) Low  Summary:  1.  Start methotrexate 4 pills once a week and folic acid 1mg a day -   2.  Stop  leflunomide  3. Cont.  Savella 25mg twice a day in the future   4. . Cont. Tramadol 50mg a day as needed.   5.. Cont. tizandine for arm pain as needed  6.  Return to clinic in 3-4 month  7. Cont. amitrpityline 20mg at night  8. Pt. Assistance form for savella         -consider CBD oil           Shara Michelle MD  11/6/2023   4:05 PM  - Reviewed [place]- information  through [employer]

## 2024-01-13 RX ORDER — AMITRIPTYLINE HYDROCHLORIDE 10 MG/1
20 TABLET, FILM COATED ORAL NIGHTLY
Qty: 180 TABLET | Refills: 1 | Status: SHIPPED | OUTPATIENT
Start: 2024-01-13

## 2024-01-22 ENCOUNTER — OFFICE VISIT (OUTPATIENT)
Dept: RHEUMATOLOGY | Facility: CLINIC | Age: 64
End: 2024-01-22
Payer: MEDICARE

## 2024-01-22 VITALS
DIASTOLIC BLOOD PRESSURE: 77 MMHG | HEIGHT: 61 IN | HEART RATE: 99 BPM | SYSTOLIC BLOOD PRESSURE: 110 MMHG | RESPIRATION RATE: 16 BRPM | WEIGHT: 197.69 LBS | BODY MASS INDEX: 37.32 KG/M2

## 2024-01-22 DIAGNOSIS — M06.4 UNDIFFERENTIATED INFLAMMATORY ARTHRITIS (HCC): Primary | ICD-10-CM

## 2024-01-22 DIAGNOSIS — Z51.81 THERAPEUTIC DRUG MONITORING: ICD-10-CM

## 2024-01-22 DIAGNOSIS — M79.7 FIBROMYALGIA: ICD-10-CM

## 2024-01-22 PROCEDURE — 99214 OFFICE O/P EST MOD 30 MIN: CPT | Performed by: INTERNAL MEDICINE

## 2024-01-22 PROCEDURE — 3078F DIAST BP <80 MM HG: CPT | Performed by: INTERNAL MEDICINE

## 2024-01-22 PROCEDURE — 3008F BODY MASS INDEX DOCD: CPT | Performed by: INTERNAL MEDICINE

## 2024-01-22 PROCEDURE — 3074F SYST BP LT 130 MM HG: CPT | Performed by: INTERNAL MEDICINE

## 2024-01-22 RX ORDER — LEFLUNOMIDE 10 MG/1
10 TABLET ORAL EVERY OTHER DAY
Qty: 45 TABLET | Refills: 1 | Status: SHIPPED | OUTPATIENT
Start: 2024-01-22

## 2024-01-22 NOTE — PATIENT INSTRUCTIONS
1.  Now back on leflunomide 10mg but take it every other day   2. Cont. amitrpityline 20mg at night  3. Cont.  Savella 25mg twice a day in the future   4. . Cont. Tramadol 50mg a day as needed.   5.. Cont. tizandine for arm pain as needed  6.  Return to clinic in 3-4 month  7.  Pt. Assistance form for savella - follow up 4/22 at 12:00 pm - ok   8. Check labs in 3-4 months.

## 2024-01-22 NOTE — PROGRESS NOTES
Sarahi Jones is a 63 year old female who presents for   Chief Complaint   Patient presents with    Follow - Up     Undifferentiated inflammatory arthritis    Medication Follow-Up   .   HPI:     I had the pleasure of seeing Sarahi Jones on 1/17/2022 for evaluation.     She is a pleasant 61 year old who has a positive JENNY 1:320 and positive lupus anticoagulant. She has ongoing joitn pain . She was dx with more osteoarthritis and fibromyalgia at [facility] rheumatology , Dr. Gómez  in 9/2021.   She was referred by Dr. Biggs.   She was dx with positive Lupus anticoagulant 3-4 years ago.   She started getting joint pain around 3-4 years ago as well.   She went on medicare in 11/2021.   She was on savella for 2 months and it was helping her pains. And then when she switched insurance she was no longer able to afford or get savella b/c of it's new tier.   She went off celebrex  at that  Time. She was on diclofenac but stopped when she got a foot surgery.     She was tried on gabapentin with neurologist, dr. domínguez  For her migraines but that didn't help.   She recently now on amitprityline 10mg at night which helps. occl she takes 20mg but it makes her feel dopey.   She tried cymbalta/duloxetine in the past but it didn't help her.     She saw dr. Hinojosa, rheumatologist in the past - 2 years ago.  She was offered plaquenil but she didn't want to take.   She takes a muscle relaxer, tizandine for her shoulder but it only helps her on and off.   She did recently get a steroid shot for her left shoulder at [facility] in 9/2021 and she's been off the tizandine. She recently is getting the numbness and tingling in her left shoudler.     She has pain with walking and standing up - 3/10 pain.   She has pain mostly in her legs and feet. -   When she gets up in the morning - it starts. Diclofenac would help a little bit . So did the savella.   When she works, as a  the pain starts again.   She feels in half a day she  gets too sore.     She gets bruising in her arms - she was told it was solar purpura by dermatologist at [facility] in 9/2021 -she has gone off nsaids and still getting it.   She does see hematologist Dr. Mcqueen at [facility].     Her right foot sx was done by Dr. Carson in 12/10/2021 - at [facility]. She did not want sx at [facility].     Is on statin   Taking tramadol as needed for right knee pain     2/11/2022    lymes test positive as expected. Filling out report - but this was likely sent in 2/2020.   Pt. Asked what to do - she cont. To have parasthesias in legs - and was not able to see ID afterwards b/c of pandemic   Refer again to ID - for possibly IV abs -   Clarified from notes that she took 1 month of doxycycyline.   Will have ID decide if she still needs IV abx for her neuropathy as neurology was the one to initially check this test.       She saw infecious disease - at [facility] infectious disease. She was seen by Dr. Hand. Told it was too late for a course of abx.   She doesn't feel beter on dulxoetine 30mg a day. - she feels tired with it.     She hasn't heard back from pt. assistenace from savella - this helped her but she is on a higher tier now.   She's 3/10 pain - she's on amitritpylien for her neruopathy.   When she is walking.       3/10/2022  savella is not covered.   She never tried LDN  She felt dulxoetine did not help her.   She tried diclofenac again -     She sees hematologist - posiitive lupus anti coagulant -     4/12/2022 -   LDN didn't help.   She saw oringinal ID doctor recently. She is going to start a month of oral abx then if that didn' thelp she was going to put on iv abx for month.     She is wondering since she hit her deductible if she can get savella now.   She is still on tizandine, tramadol and amitriptyline .     6/13/2022  She is back on savella 12.5mg twice a day.   She needs assitance program - b/c it's $199 a month   It seems to be working.   She's still taking  celebrex.   She stopped abx - and it didn't work for a couple of weeks. She talked to ID and lymes probably there for a long time. So she was offered iv abx but she declined for now .     9/13/2022  She applied for the assitance program and she qualified . She has been on savella 25mg bid. She bought a onth of her own.   She's been on this for 2 months.   She's been walking in the pool til labor day. She is hoping she will get into a indoor pool.   She feels the pain I sworse in the morning. The pain is 3-4/10 pain. During the day it's better.   The pain is much better than what it was.     shes' taking amitpriyline 10mg at night - not 20mg - not sleeping as well with this. The migraines are better.   She's off celebrex. She's off nsaids b/c of skin bruises and bleeds.   She is doing better.     Her knees nerves were ablated 2 weeks ago - so she feels better from this as well.   Overall she is better.   She is on tramadol - requests I put it in -       10/7/2022  She is bruising really bad and bleeding badly.   She is totally off nsaids - b/c it started 6 months ago.   The brusiing is getting worse.   It's more bleeding now.   A couple of weeks aog she cut back on the savella 25mg a day.   She saw hematology at [place] and told to stop the savella.   She also saw pcp - - aPTT slightly elevated.     She's not on a blood thinner.     She went to [facility] in 8/2022 and dis labs at [facility] .   The hepatologist was seen -   The dermatologist was seen - no biopsy was done  She is vit c deficient.     12/14/2022  Still getting the bruising. She did see dermatology - it's all leo her legs and arms -   She saw [place] and [facility]az - told it was thin skin - not felt it was from medications.   She had her allergy meds helds.   She went off the savella for 2 weeks and this did not decrease her bruising  She's back on the savella. She's on 25mg twice a day.     She has about 2-3/10 pain. With the change in the weather.     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   She is getting another opinoin in [place] derm and [facility].     She's off water pill and still getting bruising.     1/9/2023  Her bruising on her skin is getting worse.   She was told by another dermatologist to stop savella for 2 months.   She has cont bleeding off the think skin after 2-3 weeks.   It's over her arms.     So she would like to stop the savella.   She is interested in treatments that would help while she tries to go off.     2/10/2023  She has a large wound after a broom hit it and ripped the skin off.   She went to ER at [facility] and they sent her to wound clinic  She is on the leflunomide 10mg a day and the pain is not better - it seems t work during the day and then it gets worse as the day goes on.   Her bruising is worse - but her majar bruise was 3 weeks ago - so no new major bruises in the last 3 weeks.   She has seen derm and couldn't not see [facility].     She has new brusies -like on her arms but non have bled out like the other ones.     Her pain is 2-3/10 pain. Since the savella iti's better overall - now she's on leflunoimde -  but it's more noticeable in the afternoon going up and down the stairs.   She fell on her right knee as well and it's sore with the fall 3 weeks ago.       4/24/2023  ehs' moving better on leflunomide 10mg a day -   By mid afternoon she is worse again   But her rashes are worse - and skin bleeds are wors.e   She had to goot [facility] wound clinic for a skin bleed on her calf. She saw dr. estrada at [place] - once a week for 10 weeks and this helaed her right leg wound.   But now e has a left leg scan   She feels on lelufnomide she is bleeding more and bruising more.     She's offf savlla for 6 months.     6/14/2023   Bruising is much better - she is sitting in the sun. She saw derm at [place] again on 6/1 /2023 -     Called pt. On 5/1/2023 -  And left message that  pemphigus labs nomral - can consider siwthcing back to savella or adding savella on to help with the pain   Both leflunomide and savella are helping her with the pain -     The bruising is better in the sun.   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 Told that it was no real cure for this.     She's on lelfunomide 10mg a day and savella 25mg a day.   She went for injection for her neckk in [place] 2 weeks ago - and it's not better yet - it was better for a couple of days - but her hands are still numb .   She was walking in the water when it was warm out.     She has leg and hands are bothering her - and it's raining so the pain is more -     9/18/2023    She's really tired.   She still has the bursiing.  She went down on the leflunomide 10mg every other day.   She's feeling better on the savella 25mg bid. She's better at work.   She is better after increasing the savella.      Still Works parti time - as  on the weekends.    11/6/2023  Needs renewal for savella.     She's doing alright.   She felt her bruising was getting worse and now she feels it's getting a little better.   It's two spots on the right and a few on the left arms.   She has 3-4/10 pain.  She had foot sx on Friday. She is got screws placed for right foot toe correction    1/22/2024  She quit the methotreate - not even a month. - B/c she felt too groggy.- too much daze -   She feels the weather is making her worse in the cold.   She's been back on lelfunomide 10mg a day for 1 yvette   Liver tests 1/16/2024 was normal.   She has about 4-5/10 -   The bruising is still better.   Se has long sleeves and long socks and not hitting things   Not working as much b/c slow season.         Wt Readings from Last 2 Encounters:   01/22/24 197 lb 11.2 oz (89.7 kg)   01/11/24 202 lb (91.6 kg)     Body mass index is 37.36 kg/m².      Current Outpatient Medications   Medication Sig Dispense  Refill    amitriptyline 10 MG Oral Tab Take 2 tablets (20 mg total) by mouth nightly. 180 tablet 1    Naltrexone-buPROPion HCl ER (CONTRAVE) 8-90 MG Oral Tablet 12 Hr Week 1: 1 tablet in AM      None in PM Week 2: 1 tablet in AM      1 tablet in PM Week 3: 2 tablets in AM    1 tablet in PM Week 4: [place] 2 tablets in AM     2 tablets in PM 70 tablet 0    famotidine (ZANTAC 360 MAX ST) 20 MG Oral Tab Take 1 tablet (20 mg total) by mouth at bedtime. 30 tablet 3    tiZANidine 2 MG Oral Tab Take 1 tablet (2 mg total) by mouth every 8 (eight) hours as needed. 180 tablet 1    Fremanezumab-vfrm (AJOVY) 225 MG/1.5ML Subcutaneous Solution Prefilled Syringe Inject 225 mg into the skin every 3 (three) months. 3 each 3    RIZATRIPTAN BENZOATE 10 MG Oral Tab Take 1 tablet (10 mg total) by mouth daily. 9 tablet 11    amitriptyline 25 MG Oral Tab Take 1 tablet (25 mg total) by mouth nightly. 90 tablet 3    leflunomide 10 MG Oral Tab Take 1 tablet (10 mg total) by mouth every other day. 45 tablet 1    Milnacipran HCl 25 MG Oral Tab Take 25 mg by mouth in the morning and 25 mg before bedtime. 180 tablet 3    furosemide 20 MG Oral Tab Take 1 tablet (20 mg total) by mouth 2 (two) times daily.      fluticasone propionate 50 MCG/ACT Nasal Suspension 2 sprays by Nasal route daily.      metoprolol succinate ER 25 MG Oral Tablet 24 Hr Take 1 tablet (25 mg total) by mouth daily.      Potassium Chloride ER 20 MEQ Oral Tab CR Take 1 tablet by mouth daily with food.      Tretinoin 0.05 % External Cream Apply topically nightly.      traMADol 50 MG Oral Tab Take 1 tablet (50 mg total) by mouth daily as needed. 30 tablet 5    ibuprofen 800 MG Oral Tab       Vitamin D3, Cholecalciferol, 25 MCG (1000 UT) Oral Tab       rosuvastatin 10 MG Oral Tab Take 1 tablet (10 mg total) by mouth every evening.      Levocetirizine Dihydrochloride 5 MG Oral Tab Take 1 tablet (5 mg total) by mouth once as needed.      Montelukast Sodium 10 MG Oral Tab Take 1  tablet (10 mg total) by mouth daily.  2    PROAIR  (90 Base) MCG/ACT Inhalation Aero Soln USE 1-2 PUFFS EVERY 4-6 HOURS AS NEEDED FOR SHORTNESS OF BREATH. AND 15-30 MIN BEFORE STRENOUS ACTIV  2    EPINEPHrine 0.3 MG/0.3ML Injection Solution Auto-injector Inject 0.3 mL (1 each total) into the muscle.        Past Medical History:   Diagnosis Date    Allergic rhinitis     Arthritis     Asthma     Fibromyalgia 2020    High blood pressure     Hypercholesterolemia 01/30/2005    Migraines 1970    Obesity     PONV (postoperative nausea and vomiting)     Sleep apnea     CPAP PRESCRIBED - PT DOES NOT USE D/T CLAUSTROPHOBIA      Past Surgical History:   Procedure Laterality Date    CARPAL TUNNEL RELEASE Bilateral     COLONOSCOPY  2017    At outside facility reported as normal per patient    FOOT SURGERY Right 12/2021    KNEE REPLACEMENT SURGERY      KNEE SURGERY Right     LAPS GSTR RSTCV PX PLMT BAND      SINUS SURGERY        TUBAL LIGATION        Family History   Problem Relation Age of Onset    Cancer Mother         breast ca    Migraines Father    no fh of lupus.   1 brother and 1 sister -   2 cousins have MS.    Social History:  Social History     Socioeconomic History    Marital status: Single   Tobacco Use    Smoking status: Never    Smokeless tobacco: Never    Tobacco comments:     None   Vaping Use    Vaping Use: Never used   Substance and Sexual Activity    Alcohol use: Never    Drug use: Never   Other Topics Concern    Caffeine Concern Yes    Exercise No   Social History Narrative    The patient does not use an assistive device..      The patient does live in a home with stairs.      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   And also in the past was working in high school at [employer]       REVIEW OF SYSTEMS:   Review Of Systems:  Fatigue  Constitutional:No fever, no change in weight or appetitie  Derm:  rashes,  no oral ulcers, no alopecia, general hair loss,  no photosensitivity, no psoriasis  Gets bruises on her arms mostly - they bleeding easily - noticing over 1 year -   HEENT:  dry eyes, no dry mouth, no Raynaud's, no nasal ulcers, no parotid swelling, sometimes  neck pain, no jaw pain, no temple pain  Eyes: No visual changes,   CVS: No chest pain, no heart disease  RS: No SOB, no Cough, No Pleurtic pain,   GI: No nausea, no vomiiting, no abominal pain, no hx of ulcer, no gastritis, no heartburn, no dyshpagia, no BRBPR or melena  : no dysuria, gets lots of UTIs that she doenst' know she has - no hx of miscarriages, no DVT Hx, no hx of OCP,   Neuro: left arm  numbness or tingling,, her feet are numbwhen she wakes up,  Migraines - once to twice a month - lasts 1 week, depoprovera and takes maxalt and this helped get it under control - had migraines for a long time,  headache, no hx of seizures,   Psych: no hx of anxiety or depression  ENDO: no hx of thyroid disease, no hx of DM  Joint/Muscluskeltal: see HPI, no lower back pain,   All other ROS are negative.     EXAM:   /77   Pulse 99   Resp 16   Ht 5' 1\" (1.549 m)   Wt 197 lb 11.2 oz (89.7 kg)   BMI 37.36 kg/m²   HEENT: Clear oropharynx, no oral ulcers, EOM intact, clear sclear, PERRLA, pleasant, no acute distress, no CAD, no neck tendnerness, good ROM,   CVS: RRR, no murmurs  RS: CTAB, no crackles, no rhonchi  ABD: Soft Non tender, no HSM felt, BS positive  Joint exam:   Tender points - in lower legs improved   Tender in 1-5th mcps and pips - no syvnoitis seen   No upper extremities tenderness.   No lower back tenderness  No neck tenderness   Squeeze test negative   Healed bruise over left pretibial area -  Healed bruise over left forearm -   Scattered pinpoint lesions that were bleeding per pt - now healed.       Component      Latest Ref Rng & Units 2/6/2020   MYELOPEROX ANTIBODIES, IGG      0 - 19 AU/mL 0   SERINE PROTEASE3, IGG      0 - 19 AU/mL 5   B.  BURGDORFERI, IGG WB      Negative Positive (A)   B. BURGDORFERI AB, IGM BY WB      Negative Negative   JENNY Titer/Pattern      <80 160 (A)   Reviewed By:       Sascha Medrano M.D.   Anti-Sjogren's A      Negative Negative   Anti-Sjogren's B      Negative Negative   Anti-Smith Antibody      Negative Negative   Anti-Wheeler/RNP Antibody      Negative Negative   Cardiolipin Antibody, IgA      0 - 11 APL 0   Cardiolipin IgG Antibody      0.0 - 14.9 GPL 4.7   Cardiolipin IgM Antibody      0.0 - 12.4 MPL 15.7 (H)   RHEUMATOID FACTOR      <15 IU/mL <10   JENNY SCREEN WITH REFLEX (S)      Negative Positive (A)   Lyme Screen IgG and IgM      Negative Equivocal   Gliadin Deamidated IgG Ab      <7.0 U/mL 0.5   Tissue Transglutaminase IgA Ab      <7.0 U/mL 0.2   ENDOMYSIAL ANTIBODY, IGA BY IF      <1:10 <1:10   Gliadin Deamidated IgA Ab      <7.0 U/mL 18.0 (H)   IMMUNOGLOBULIN A      70.00 - 312.00 mg/dL 132.00   Anti Double Strand DNA      <10 <10     Component      Latest Ref Rng & Units 2/3/2021   WBC      4.0 - 11.0 x10(3) uL 8.2   RBC      3.80 - 5.30 x10(6)uL 4.84   Hemoglobin      12.0 - 16.0 g/dL 13.2   Hematocrit      35.0 - 48.0 % 42.5   MCV      80.0 - 100.0 fL 87.8   MCH      26.0 - 34.0 pg 27.3   MCHC      31.0 - 37.0 g/dL 31.1   RDW-SD      35.1 - 46.3 fL 45.4   RDW      11.0 - 15.0 % 14.1   Platelet Count      150.0 - 450.0 10(3)uL 220.0   Prelim Neutrophil Abs      1.50 - 7.70 x10 (3) uL 4.85   Neutrophils Absolute      1.50 - 7.70 x10(3) uL 4.85   Lymphocytes Absolute      1.00 - 4.00 x10(3) uL 2.45   Monocytes Absolute      0.10 - 1.00 x10(3) uL 0.72   Eosinophils Absolute      0.00 - 0.70 x10(3) uL 0.10   Basophils Absolute      0.00 - 0.20 x10(3) uL 0.07   Immature Granulocyte Absolute      0.00 - 1.00 x10(3) uL 0.02   Neutrophils %      % 59.1   Lymphocytes %      % 29.8   Monocytes %      % 8.8   Eosinophils %      % 1.2   Basophils %      % 0.9   Immature Granulocyte %      % 0.2   Glucose      70 - 99 mg/dL  96   Sodium      136 - 145 mmol/L 137   Potassium      3.5 - 5.1 mmol/L 4.3   Chloride      98 - 112 mmol/L 105   Carbon Dioxide, Total      21.0 - 32.0 mmol/L 25.0   ANION GAP      0 - 18 mmol/L 7   BUN      7 - 18 mg/dL 14   CREATININE      0.55 - 1.02 mg/dL 0.96   BUN/CREATININE RATIO      10.0 - 20.0 14.6   CALCIUM      8.5 - 10.1 mg/dL 9.5   CALCULATED OSMOLALITY      275 - 295 mOsm/kg 284   eGFR NON-AFR. AMERICAN      >=60 64   eGFR AFRICAN AMERICAN      >=60 74   ALT (SGPT)      13 - 56 U/L 24   AST (SGOT)      15 - 37 U/L 17   ALKALINE PHOSPHATASE      50 - 130 U/L 101   Total Bilirubin      0.1 - 2.0 mg/dL 0.6   PROTEIN, TOTAL      6.4 - 8.2 g/dL 7.4   Albumin      3.4 - 5.0 g/dL 3.7   Globulin      2.8 - 4.4 g/dL 3.7   A/G Ratio      1.0 - 2.0 1.0   Patient Fasting?       Yes   PT      11.8 - 14.5 seconds 13.0   INR      0.90 - 1.20 1.00     Component      Latest Ref Rng & Units 1/17/2022   B. BURGDORFERI, IGG WB      Negative Positive (A)   B. BURGDORFERI AB, IGM BY WB      Negative Negative   ALDOLASE, SERUM      1.5 - 8.1 U/L 5.3   CK      26 - 192 U/L 67   C-REACTIVE PROTEIN      <0.30 mg/dL 1.14 (H)   SED RATE      0 - 30 mm/Hr 24   Lyme Screen IgG and IgM      Negative Positive (A)               Component      Latest Ref Rng & Units 1/17/2022   B. BURGDORFERI, IGG WB      Negative Positive (A)   B. BURGDORFERI AB, IGM BY WB      Negative Negative   ALDOLASE, SERUM      1.5 - 8.1 U/L 5.3   CK      26 - 192 U/L 67   C-REACTIVE PROTEIN      <0.30 mg/dL 1.14 (H)   SED RATE      0 - 30 mm/Hr 24   Lyme Screen IgG and IgM      Negative Positive (A)                         Component      Latest Ref Rng 2/10/2023   C-REACTIVE PROTEIN      <0.30 mg/dL 0.71 (H)    SED RATE      0 - 30 mm/Hr 50 (H)       Component      Latest Ref Rng 4/24/2023   Result Comment    Desmoglein (Dsg) 1      U/ml 10.1    Desmoglein (Dsg) 3      U/ml 7.2    Clinical Relevance Notes    Electronically Signed By Comment    SED RATE       0 - 30 mm/Hr 10    C-REACTIVE PROTEIN      <0.30 mg/dL <0.29          Component      Latest Ref [facility] 10/31/2023   WBC      4.0 - 11.0 x10(3) uL 6.7    RBC      3.80 - 5.30 x10(6)uL 4.36    Hemoglobin      12.0 - 16.0 g/dL 12.4    Hematocrit      35.0 - 48.0 % 39.0    MCV      80.0 - 100.0 fL 89.4    MCH      26.0 - 34.0 pg 28.4    MCHC      31.0 - 37.0 g/dL 31.8    RDW-SD      35.1 - 46.3 fL 46.4 (H)    RDW      11.0 - 15.0 % 14.2    Platelet Count      150.0 - 450.0 10(3)uL 220.0    Prelim Neutrophil Abs      1.50 - 7.70 x10 (3) uL 3.72    Neutrophils Absolute      1.50 - 7.70 x10(3) uL 3.72    Lymphocytes Absolute      1.00 - 4.00 x10(3) uL 2.05    Monocytes Absolute      0.10 - 1.00 x10(3) uL 0.69    Eosinophils Absolute      0.00 - 0.70 x10(3) uL 0.18    Basophils Absolute      0.00 - 0.20 x10(3) uL 0.07    Immature Granulocyte Absolute      0.00 - 1.00 x10(3) uL 0.02    Neutrophils %      % 55.2    Lymphocytes %      % 30.5    Monocytes %      % 10.3    Eosinophils %      % 2.7    Basophils %      % 1.0    Immature Granulocyte %      % 0.3    Color Urine      Yellow  Yellow    Clarity Urine      Clear  Ex.Turbid !    Spec Gravity      1.005 - 1.030  1.025    Glucose Urine      Normal mg/dL Normal    Bilirubin Urine      Negative  Negative    Ketones, UA      Negative mg/dL Negative    Blood Urine      Negative  Negative    PH Urine      5.0 - 8.0  6.0    Protein Urine      Negative mg/dL 30 !    Urobilinogen Urine      Normal  3 !    Nitrite Urine      Negative  Negative    Leukocyte Esterase       Negative  250 !    WBC Urine      0 - 5 /HPF 6-10 !    RBC Urine      0 - 2 /HPF 0-2    Bacteria Urine      None Seen /HPF 1+ !    SQUAM EPI CELLS UR      None Seen /HPF Few !    RENAL TUBULAR EPITHELIAL CELLS      None Seen /HPF None Seen    TRANSITIONAL EPI CELLS      None Seen /HPF Few !    HYALINE CASTS      None Seen /LPF Present !    YEAST URINE      None Seen /HPF None Seen    Glucose      70 - 99 mg/dL 102 (H)     Sodium      136 - 145 mmol/L 142    Potassium      3.5 - 5.1 mmol/L 3.4 (L)    Chloride      98 - 112 mmol/L 107    Carbon Dioxide, Total      21.0 - 32.0 mmol/L 28.0    ANION GAP      0 - 18 mmol/L 7    BUN      9 - 23 mg/dL 7 (L)    CREATININE      0.55 - 1.02 mg/dL 0.86    BUN/CREATININE RATIO      10.0 - 20.0  8.1 (L)    CALCIUM      8.7 - 10.4 mg/dL 9.4    CALCULATED OSMOLALITY      275 - 295 mOsm/kg 292    EGFR      >=60 mL/min/1.73m2 75    Patient Fasting for BMP? No    AST (SGOT)      <=34 U/L 18    ALT (SGPT)      10 - 49 U/L 9 (L)    C-REACTIVE PROTEIN      <1.00 mg/dL <0.40    SED RATE      0 - 30 mm/Hr 41 (H)       1/16/2024 - wbc is 8.09, hb is 11.8, plt are 259   Cmp - cr is 0.93, ast is 25, alt is 26,             ASSESSMENT AND PLAN:   Sarahi Jones is a 63 year old female who presents for   Chief Complaint   Patient presents with    Follow - Up     Undifferentiated inflammatory arthritis    Medication Follow-Up       1. Fibromyalgia/osteoarthritis - joitn pain jayce in legs , elevated sed rateand crp   - possibly seronegative RA - overalap  Doing better on savella - back on this 25mg twice a day  -   tried  going off for 2 months with no improvement of her skin bleeding .   On savella she is at least - this is working for her - she feels 40-50% better -   - stopped around 1/9/2023 - - restaretd in 5/2023 -   Cont. savella 25mg abid   - cont.amitriptylien 20mg at night   tramdaol 50mg prn   - stop . Lefunomide 10mg every other day - tapered due to elevated LFTS - now normal  - but now elevated sed rate again and more stiffess  - resarted leflunomide 10mg a day - 1 month ago - no difference yet   Asked her to goto every other day with this.   But she has more inflammation now - and stffness is worse.   Tried methotrexate 10mg a week and fa 1mg a day  - was  too groggy -         - rtc in 3-4 months.   Tramadol usually given by pcp - I will refill and take over - since she states she hasn't seen her  pcp in a long time -     In the past:   She has had no luck with any other medications helping her.  Was on savella in the past - now not able to take b/c of her insurance coverage, can't afford it - b/c it's on a different tier, seh tried pt. assitance with dr. García,   Tried hcq for 1month - and then stopped it - b/c savella is working for her.   In the past hcq never helped her.   No improvement on  LDN 4.5mg a day    No better on dulxoetine 30mg a day - not better with  30mg twice a day -   In the past treid gabapentin- weight gain, lyrica didn't help,         Would try since all other options have failed and this has given some succes to patients.   Can't get good rx or assiteance for savella   Takes tizandine - helps with arm pain - doesn't help with leg pains    2. brusiing over legs - getting better.   with bleeding into the skin with hx of lupus anticoagulation -   Garnder-miladys syndrome -   Follow up dr. Garcia at [facility] -     brusiing of skin has been going on for months - longer than starting the savella, savella has causes some brusiing for her in the past   The bleeding into the skin is toally new in the last few months.   Tried stopping savella for 2 weeks but the brusinign continued. So she is back on savella - but still has bruises   - off  savella for at least 2 months = stopped 1/9/2023 - now off for 3 months   Started on leflunomide - and feels bruising and bleeding is worsening but overall it's been stabe on this     -not  pemphigous - ? Foliaceus type desmoglein 1 and 3negative    Had work up at [facility] 8/2022 (records reviewed) and hematology last week at [facility] (unable to review records)   Saw [place] again in 10/2022   Saw Dr. Mena at [facility] in 11/2022 -   Seen dermatology a few times.     She's not on warfarin that causes skin necrosis or nsaids that cause pseudoreaction in the sun   Lupus anticoagulant usually not assoactiaed with this kind of brusiing   It's not a typical  look for vasculitis eitehr   She doesn't decribe bullous lesions seen with pemphigoid or TEN either -  y  Doesn't appear like pyoderma gangrenosum   Appears like nsaids related pseudoreactoin but she is off all nadis.         3. undifferentiated connective tissue disease -  - Positive JENNY 1:320 and lupus anticoagulant - no raynaud's, easy bruising in arms , hx of migraines   Elevated esr and crp-  Slightly elevated esr and crp -   Work up for lupus has been negative per rheumatology at [facility]   Hx of hcq - did not help her -   -d/w her to try methotrexate - she declined - b/c of hair loss possiblity   - stopped  leflunomide every 10mg aevery other  day - started in 1/2023- doing better on this til 11/2023  Now back on it since 12/2023  Asked her to [employer] again to every other day due to hx of elevated lfts - -   Off methotrexaye 10mg a  weel and fa 1mg a day- too groggy with this.   Rtc in 3-4  months   ;abs in 3-4 months     She feels bruising is worse on this - it is also helping the pain   - cont. For now - will see if bruising improves off savella for a longer time and see if leflunomide helps with the pain and inflammation    Hematology follow b/c of bruising - no clear etiology - not releated to lupus anticoaglunat at this time.     4. Right knee osteoarthritis - s/p right knee arthroplasty - in 10/2018  Still hurts   Takes tramadol 50mg a day   Tried diclofenac and celebrex in the past.   meloxicam didn't help.   Diclofenac helped a little bit. VOLTAREN GEL 1 % topical helped a litlte bit.   Had sx at [facility] - will be disucssing with Dr. King to see if she needs another sx.       5. Left shoulder pain - better with steroid injection in 9/2021 - assc numbness and tingling.      6. Lyme disease - in 2/2020 - pos. Burgdorferi. Saw ID - treated with abx 1 month of doxycycline. She felt better after the oral abx but then felt bad again.   She was not able to return to [facility] b/c of Covid lockdown happened in 3/2020.  Never got the disucssion if she needed IV abx.   Has numbness in legs til she gets motivated.   She had EMG   She feels her feet and legs started around the time she was dx with lymes.   [facility] on 2/10/2022 - they did not recommend further treatment = -  Has another follow up -   [facility] appointment. [facility]  - not keen on going on iv abx     7. Neuropathy /headaches - on amitriptyline 10mg at night - increase to 20mg a day - since 25mg makes her too drowsy     8. Hx of candida esophagitis - in 12/2022 - egd - treatedw ith anti fungal      Summary:  1.  Now back on leflunomide 10mg but take it every other day   2. Cont. amitrpityline 20mg at night  3. Cont.  Savella 25mg twice a day in the future   4. . Cont. Tramadol 50mg a day as needed.   5.. Cont. tizandine for arm pain as needed  6.  Return to clinic in 3-4 month  7.  Pt. Assistance form for savella   8. Check labs in 3-4 months.       -consider CBD oil      Shara Michelle MD  1/22/2024   11:17 AM

## 2024-03-04 RX ORDER — TIZANIDINE 2 MG/1
2 TABLET ORAL EVERY 8 HOURS PRN
Qty: 180 TABLET | Refills: 1 | Status: SHIPPED | OUTPATIENT
Start: 2024-03-04

## 2024-03-04 NOTE — TELEPHONE ENCOUNTER
LOV: 1/22/24  Last Refilled:#180, 1rf 12/21/23    Encounter Date: 1/22/2024       1.  Now back on leflunomide 10mg but take it every other day   2. Cont. amitrpityline 20mg at night  3. Cont.  Savella 25mg twice a day in the future   4. . Cont. Tramadol 50mg a day as needed.   5.. Cont. tizandine for arm pain as needed  6.  Return to clinic in 3-4 month  7.  Pt. Assistance form for savella - follow up 4/22 at 12:00 pm - ok   8. Check labs in 3-4 months.       Electronically signed by Shara Michelle MD at 1/22/2024 11:34 AM  Please advise.

## 2024-03-11 ENCOUNTER — PATIENT MESSAGE (OUTPATIENT)
Dept: RHEUMATOLOGY | Facility: CLINIC | Age: 64
End: 2024-03-11

## 2024-03-11 RX ORDER — FAMOTIDINE 20 MG/1
20 TABLET, FILM COATED ORAL NIGHTLY
Qty: 90 TABLET | Refills: 1 | Status: SHIPPED | OUTPATIENT
Start: 2024-03-11

## 2024-03-11 NOTE — TELEPHONE ENCOUNTER
Requested Prescriptions     Pending Prescriptions Disp Refills    FAMOTIDINE 20 MG Oral Tab [Pharmacy Med Name: FAMOTIDINE 20 MG TABLET] 90 tablet 1     Sig: TAKE 1 TABLET BY MOUTH EVERYDAY AT BEDTIME     LOV: 12/27/2023  Last Refill: 12/27/2023

## 2024-03-26 ENCOUNTER — TELEPHONE (OUTPATIENT)
Dept: RHEUMATOLOGY | Facility: CLINIC | Age: 64
End: 2024-03-26

## 2024-03-26 ENCOUNTER — LAB ENCOUNTER (OUTPATIENT)
Dept: LAB | Facility: HOSPITAL | Age: 64
End: 2024-03-26
Attending: INTERNAL MEDICINE
Payer: MEDICARE

## 2024-03-26 DIAGNOSIS — Z51.81 THERAPEUTIC DRUG MONITORING: ICD-10-CM

## 2024-03-26 DIAGNOSIS — M06.4 UNDIFFERENTIATED INFLAMMATORY ARTHRITIS (HCC): ICD-10-CM

## 2024-03-26 LAB
ALT SERPL-CCNC: 12 U/L
AST SERPL-CCNC: 22 U/L (ref ?–34)
CREAT BLD-MCNC: 0.85 MG/DL
CRP SERPL-MCNC: <0.4 MG/DL (ref ?–1)
DEPRECATED RDW RBC AUTO: 44.3 FL (ref 35.1–46.3)
EGFRCR SERPLBLD CKD-EPI 2021: 76 ML/MIN/1.73M2 (ref 60–?)
ERYTHROCYTE [DISTWIDTH] IN BLOOD BY AUTOMATED COUNT: 13.7 % (ref 11–15)
ERYTHROCYTE [SEDIMENTATION RATE] IN BLOOD: 34 MM/HR
HCT VFR BLD AUTO: 38.8 %
HGB BLD-MCNC: 12.3 G/DL
MCH RBC QN AUTO: 27.8 PG (ref 26–34)
MCHC RBC AUTO-ENTMCNC: 31.7 G/DL (ref 31–37)
MCV RBC AUTO: 87.8 FL
PLATELET # BLD AUTO: 199 10(3)UL (ref 150–450)
RBC # BLD AUTO: 4.42 X10(6)UL
WBC # BLD AUTO: 5.4 X10(3) UL (ref 4–11)

## 2024-03-26 PROCEDURE — 84460 ALANINE AMINO (ALT) (SGPT): CPT

## 2024-03-26 PROCEDURE — 85652 RBC SED RATE AUTOMATED: CPT

## 2024-03-26 PROCEDURE — 86140 C-REACTIVE PROTEIN: CPT

## 2024-03-26 PROCEDURE — 82565 ASSAY OF CREATININE: CPT

## 2024-03-26 PROCEDURE — 85027 COMPLETE CBC AUTOMATED: CPT

## 2024-03-26 PROCEDURE — 84450 TRANSFERASE (AST) (SGOT): CPT

## 2024-03-26 PROCEDURE — 36415 COLL VENOUS BLD VENIPUNCTURE: CPT

## 2024-03-26 NOTE — TELEPHONE ENCOUNTER
Patient stopped by . She states Marilin needs her Medicare Part d and a new script for Savella. Please advise. She also dropped off letter regarding Savella.

## 2024-03-27 NOTE — TELEPHONE ENCOUNTER
Phoned [facility]; spoke to Gabino. Inquired what additional information is needed for [employer] Patient Assistance Application. Gabino states all that is needed currently is the back side of the Medicare card and proof of income. Gabino confirmed my Ashli will notify of proof of income need as this is a new request. Informed Gabino our office will fax the Medicare card info now.

## 2024-03-27 NOTE — TELEPHONE ENCOUNTER
Phoned pt. Informed her of details of call with my [employer] as noted in previous message.

## 2024-04-19 RX ORDER — LEFLUNOMIDE 10 MG/1
10 TABLET ORAL EVERY OTHER DAY
Qty: 45 TABLET | Refills: 1 | Status: SHIPPED | OUTPATIENT
Start: 2024-04-19

## 2024-04-19 NOTE — TELEPHONE ENCOUNTER
Requested Prescriptions     Pending Prescriptions Disp Refills    LEFLUNOMIDE 10 MG Oral Tab [Pharmacy Med Name: LEFLUNOMIDE 10 MG TABLET] 45 tablet 1     Sig: TAKE 1 TABLET BY MOUTH EVERY OTHER DAY     Future Appointments   Date Time Provider Department Center   4/22/2024 12:00 PM Shara Michelle MD [facility]     LOV: 1/22/24  Last Refilled:1/22/24 #45 1RF   Labs:  Component      Latest Ref Rng 3/26/2024   WBC      4.0 - 11.0 x10(3) uL 5.4    RBC      3.80 - 5.30 x10(6)uL 4.42    Hemoglobin      12.0 - 16.0 g/dL 12.3    Hematocrit      35.0 - 48.0 % 38.8    MCV      80.0 - 100.0 fL 87.8    MCH      26.0 - 34.0 pg 27.8    MCHC      31.0 - 37.0 g/dL 31.7    RDW      11.0 - 15.0 % 13.7    RDW-SD      35.1 - 46.3 fL 44.3    Platelet Count      150.0 - 450.0 10(3)uL 199.0    CREATININE      0.55 - 1.02 mg/dL 0.85    EGFR      >=60 mL/min/1.73m2 76    ALT (SGPT)      10 - 49 U/L 12    SED RATE      0 - 30 mm/Hr 34 (H)    C-REACTIVE PROTEIN      <1.00 mg/dL <0.40    AST (SGOT)      <=34 U/L 22       Legend:  (H) High    Summary:  1.  Now back on leflunomide 10mg but take it every other day   2. Cont. amitrpityline 20mg at night  3. Cont.  Savella 25mg twice a day in the future   4. . Cont. Tramadol 50mg a day as needed.   5.. Cont. tizandine for arm pain as needed  6.  Return to clinic in 3-4 month  7.  Pt. Assistance form for savella   8. Check labs in 3-4 months.         -consider CBD oil        Shara Michelle MD  1/22/2024   11:17 AM

## 2024-04-22 ENCOUNTER — OFFICE VISIT (OUTPATIENT)
Dept: RHEUMATOLOGY | Facility: CLINIC | Age: 64
End: 2024-04-22
Payer: MEDICARE

## 2024-04-22 VITALS
WEIGHT: 205 LBS | BODY MASS INDEX: 38.71 KG/M2 | DIASTOLIC BLOOD PRESSURE: 94 MMHG | RESPIRATION RATE: 16 BRPM | HEART RATE: 105 BPM | SYSTOLIC BLOOD PRESSURE: 135 MMHG | HEIGHT: 61 IN

## 2024-04-22 DIAGNOSIS — M06.4 UNDIFFERENTIATED INFLAMMATORY ARTHRITIS (HCC): Primary | ICD-10-CM

## 2024-04-22 DIAGNOSIS — Z51.81 THERAPEUTIC DRUG MONITORING: ICD-10-CM

## 2024-04-22 DIAGNOSIS — M79.7 FIBROMYALGIA: ICD-10-CM

## 2024-04-22 PROCEDURE — 3080F DIAST BP >= 90 MM HG: CPT | Performed by: INTERNAL MEDICINE

## 2024-04-22 PROCEDURE — 3075F SYST BP GE 130 - 139MM HG: CPT | Performed by: INTERNAL MEDICINE

## 2024-04-22 PROCEDURE — 99214 OFFICE O/P EST MOD 30 MIN: CPT | Performed by: INTERNAL MEDICINE

## 2024-04-22 PROCEDURE — 3008F BODY MASS INDEX DOCD: CPT | Performed by: INTERNAL MEDICINE

## 2024-04-22 RX ORDER — HYDROXYCHLOROQUINE SULFATE 200 MG/1
200 TABLET, FILM COATED ORAL 2 TIMES DAILY
Qty: 180 TABLET | Refills: 1 | Status: SHIPPED | OUTPATIENT
Start: 2024-04-22

## 2024-04-22 NOTE — PATIENT INSTRUCTIONS
1.  Cont.  leflunomide 10mg but take it every other day   2. Cont. amitrpityline 20mg at night  3. Cont.  Savella 25mg twice a day in the future  - Pt. Assistance form for savella filled out - today -   4. . Cont. Tramadol 50mg a day as needed.   5.. Cont. tizandine for arm pain as needed  6.  Return to clinic in 3-4 month  7.   Check labs in 3-4 months.   8. Add hydroxychlroquine 200mg twice a day     [email or web address]

## 2024-04-22 NOTE — PROGRESS NOTES
Sarahi Jones is a 64 year old female who presents for   Chief Complaint   Patient presents with    Follow - Up     Undifferentiated inflammatory arthritis    Medication Follow-Up    Lab Results   .   HPI:     I had the pleasure of seeing Sarahi Jones on 1/17/2022 for evaluation.     She is a pleasant 61 year old who has a positive JENNY 1:320 and positive lupus anticoagulant. She has ongoing joitn pain . She was dx with more osteoarthritis and fibromyalgia at [facility] , Dr. Gómez  in 9/2021.   She was referred by Dr. Biggs.   She was dx with positive Lupus anticoagulant 3-4 years ago.   She started getting joint pain around 3-4 years ago as well.   She went on medicare in 11/2021.   She was on savella for 2 months and it was helping her pains. And then when she switched insurance she was no longer able to afford or get savella b/c of it's new tier.   She went off celebrex  at that  Time. She was on diclofenac but stopped when she got a foot surgery.     She was tried on gabapentin with neurologist, dr. domínguez  For her migraines but that didn't help.   She recently now on amitprityline 10mg at night which helps. occl she takes 20mg but it makes her feel dopey.   She tried cymbalta/duloxetine in the past but it didn't help her.     She saw dr. Hinojosa, rheumatologist in the past - 2 years ago.  She was offered plaquenil but she didn't want to take.   She takes a muscle relaxer, tizandine for her shoulder but it only helps her on and off.   She did recently get a steroid shot for her left shoulder at [place] in 9/2021 and she's been off the tizandine. She recently is getting the numbness and tingling in her left shoudler.     She has pain with walking and standing up - 3/10 pain.   She has pain mostly in her legs and feet. -   When she gets up in the morning - it starts. Diclofenac would help a little bit . So did the savella.   When she works, as a  the pain starts again.   She feels in  half a day she gets too sore.     She gets bruising in her arms - she was told it was solar purpura by dermatologist at [facility] in 9/2021 -she has gone off nsaids and still getting it.   She does see hematologist Dr. Mcqueen at [facility].     Her right foot sx was done by Dr. Carson in 12/10/2021 - at [facility]. She did not want sx at [facility].     Is on statin   Taking tramadol as needed for right knee pain     2/11/2022    lymes test positive as expected. Filling out report - but this was likely sent in 2/2020.   Pt. Asked what to do - she cont. To have parasthesias in legs - and was not able to see ID afterwards b/c of pandemic   Refer again to ID - for possibly IV abs -   Clarified from notes that she took 1 month of doxycycyline.   Will have ID decide if she still needs IV abx for her neuropathy as neurology was the one to initially check this test.       She saw infecious disease - at [facility] infectious disease. She was seen by Dr. Hand. Told it was too late for a course of abx.   She doesn't feel beter on dulxoetine 30mg a day. - she feels tired with it.     She hasn't heard back from pt. assistenace from savella - this helped her but she is on a higher tier now.   She's 3/10 pain - she's on amitritpylien for her neruopathy.   When she is walking.       3/10/2022  savella is not covered.   She never tried LDN  She felt dulxoetine did not help her.   She tried diclofenac again -     She sees hematologist - posiitive lupus anti coagulant -     4/12/2022 -   LDN didn't help.   She saw oringinal ID doctor recently. She is going to start a month of oral abx then if that didn' thelp she was going to put on iv abx for month.     She is wondering since she hit her deductible if she can get savella now.   She is still on tizandine, tramadol and amitriptyline .     6/13/2022  She is back on savella 12.5mg twice a day.   She needs assitance program - b/c it's $199 a month   It seems to be working.    She's still taking celebrex.   She stopped abx - and it didn't work for a couple of weeks. She talked to ID and lymes probably there for a long time. So she was offered iv abx but she declined for now .     9/13/2022  She applied for the assitance program and she qualified . She has been on savella 25mg bid. She bought a onth of her own.   She's been on this for 2 months.   She's been walking in the pool til labor day. She is hoping she will get into a indoor pool.   She feels the pain I sworse in the morning. The pain is 3-4/10 pain. During the day it's better.   The pain is much better than what it was.     shes' taking amitpriyline 10mg at night - not 20mg - not sleeping as well with this. The migraines are better.   She's off celebrex. She's off nsaids b/c of skin bruises and bleeds.   She is doing better.     Her knees nerves were ablated 2 weeks ago - so she feels better from this as well.   Overall she is better.   She is on tramadol - requests I put it in -       10/7/2022  She is bruising really bad and bleeding badly.   She is totally off nsaids - b/c it started 6 months ago.   The brusiing is getting worse.   It's more bleeding now.   A couple of weeks aog she cut back on the savella 25mg a day.   She saw hematology at [place] and told to stop the savella.   She also saw pcp - - aPTT slightly elevated.     She's not on a blood thinner.     She went to [facility] in 8/2022 and dis labs at [facility] .   The hepatologist was seen -   The dermatologist was seen - no biopsy was done  She is vit c deficient.     12/14/2022  Still getting the bruising. She did see dermatology - it's all leo her legs and arms -   She saw [facility] and chaz - told it was thin skin - not felt it was from medications.   She had her allergy meds helds.   She went off the savella for 2 weeks and this did not decrease her bruising  She's back on the savella. She's on 25mg twice a day.     She has about 2-3/10 pain. With the change in the  weather.     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   She is getting another opinoin in [place] derm and planning to see arlene Parker as mir.     She's off water pill and still getting bruising.     1/9/2023  Her bruising on her skin is getting worse.   She was told by another dermatologist to stop savella for 2 months.   She has cont bleeding off the think skin after 2-3 weeks.   It's over her arms.     So she would like to stop the savella.   She is interested in treatments that would help while she tries to go off.     2/10/2023  She has a large wound after a broom hit it and ripped the skin off.   She went to ER at [facility] and they sent her to wound clinic  She is on the leflunomide 10mg a day and the pain is not better - it seems t work during the day and then it gets worse as the day goes on.   Her bruising is worse - but her majar bruise was 3 weeks ago - so no new major bruises in the last 3 weeks.   She has seen derm and couldn't not see [facility].     She has new brusies -like on her arms but non have bled out like the other ones.     Her pain is 2-3/10 pain. Since the savella iti's better overall - now she's on leflunoimde -  but it's more noticeable in the afternoon going up and down the stairs.   She fell on her right knee as well and it's sore with the fall 3 weeks ago.       4/24/2023  sh'es' moving better on leflunomide 10mg a day -   By mid afternoon she is worse again   But her rashes are worse - and skin bleeds are wors.e   She had to goot [facility] wound clinic for a skin bleed on her calf. She saw dr. estrada at [place] - once a week for 10 weeks and this helaed her right leg wound.   But now e has a left leg scan   She feels on lelufnomide she is bleeding more and bruising more.     She's offf savlla for 6 months.     6/14/2023   Bruising is much better - she is sitting in the sun. She saw derm at [place] again on 6/1 /2023 -     Called pt. On 5/1/2023 -  And  left message that pemphigus labs nomral - can consider siwthcing back to savella or adding savella on to help with the pain   Both leflunomide and savella are helping her with the pain -     The bruising is better in the sun.   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 Told that it was no real cure for this.     She's on lelfunomide 10mg a day and savella 25mg a day.   She went for injection for her neckk in [place] 2 weeks ago - and it's not better yet - it was better for a couple of days - but her hands are still numb .   She was walking in the water when it was warm out.     She has leg and hands are bothering her - and it's raining so the pain is more -     9/18/2023    She's really tired.   She still has the bursiing.  She went down on the leflunomide 10mg every other day.   She's feeling better on the savella 25mg bid. She's better at work.   She is better after increasing the savella.      Still Works parti time - as  on the weekends.    11/6/2023  Needs renewal for savella.     She's doing alright.   She felt her bruising was getting worse and now she feels it's getting a little better.   It's two spots on the right and a few on the left arms.   She has 3-4/10 pain.  She had foot sx on Friday. She is got screws placed for right foot toe correction    1/22/2024  She quit the methotreate - not even a month. - B/c she felt too groggy.- too much daze -   She feels the weather is making her worse in the cold.   She's been back on lelfunomide 10mg a day for 1 yvette   Liver tests 1/16/2024 was normal.   She has about 4-5/10 -   The bruising is still better.   [facility] has long sleeves and long socks and not hitting things   Not working as much b/c slow season.     4/22/2024  She hasn't gotten savella since October and November -   Good rx is still over $300 a month.   Her pain is bad in the morning and late at night.   She feels if she goes to the  bathroom then her joints loosen up.   Closer to the end of the day , it's hard to get up.   She has low energy.   She has about 3-4/10 pain.       Wt Readings from Last 2 Encounters:   04/22/24 205 lb (93 kg)   01/22/24 197 lb 11.2 oz (89.7 kg)     Body mass index is 38.73 kg/m².      Current Outpatient Medications   Medication Sig Dispense Refill    leflunomide 10 MG Oral Tab Take 1 tablet (10 mg total) by mouth every other day. 45 tablet 1    semaglutide (OZEMPIC, 0.25 OR 0.5 MG/DOSE,) 2 MG/3ML Subcutaneous Solution Pen-injector Inject 0.25 mg into the skin once a week. 3 mL 0    famotidine 20 MG Oral Tab Take 1 tablet (20 mg total) by mouth nightly. 90 tablet 1    tiZANidine 2 MG Oral Tab Take 1 tablet (2 mg total) by mouth every 8 (eight) hours as needed. 180 tablet 1    amitriptyline 10 MG Oral Tab Take 2 tablets (20 mg total) by mouth nightly. 180 tablet 1    Fremanezumab-vfrm (AJOVY) 225 MG/1.5ML Subcutaneous Solution Prefilled Syringe Inject 225 mg into the skin every 3 (three) months. 3 each 3    RIZATRIPTAN BENZOATE 10 MG Oral Tab Take 1 tablet (10 mg total) by mouth daily. 9 tablet 11    amitriptyline 25 MG Oral Tab Take 1 tablet (25 mg total) by mouth nightly. 90 tablet 3    Milnacipran HCl 25 MG Oral Tab Take 25 mg by mouth in the morning and 25 mg before bedtime. 180 tablet 3    furosemide 20 MG Oral Tab Take 1 tablet (20 mg total) by mouth 2 (two) times daily.      fluticasone propionate 50 MCG/ACT Nasal Suspension 2 sprays by Nasal route daily.      metoprolol succinate ER 25 MG Oral Tablet 24 Hr Take 1 tablet (25 mg total) by mouth daily.      Potassium Chloride ER 20 MEQ Oral Tab CR Take 1 tablet by mouth daily with food.      Tretinoin 0.05 % External Cream Apply topically nightly.      traMADol 50 MG Oral Tab Take 1 tablet (50 mg total) by mouth daily as needed. 30 tablet 5    ibuprofen 800 MG Oral Tab       Vitamin D3, Cholecalciferol, 25 MCG (1000 UT) Oral Tab       rosuvastatin 10 MG Oral Tab  Take 1 tablet (10 mg total) by mouth every evening.      Levocetirizine Dihydrochloride 5 MG Oral Tab Take 1 tablet (5 mg total) by mouth once as needed.      Montelukast Sodium 10 MG Oral Tab Take 1 tablet (10 mg total) by mouth daily.  2    PROAIR  (90 Base) MCG/ACT Inhalation Aero Soln USE 1-2 PUFFS EVERY 4-6 HOURS AS NEEDED FOR SHORTNESS OF BREATH. AND 15-30 MIN BEFORE STRENOUS ACTIV  2    EPINEPHrine 0.3 MG/0.3ML Injection Solution Auto-injector Inject 0.3 mL (1 each total) into the muscle.      Naltrexone-buPROPion HCl ER (CONTRAVE) 8-90 MG Oral Tablet 12 Hr Week 1: 1 tablet in AM      None in PM Week 2: 1 tablet in AM      1 tablet in PM Week 3: 2 tablets in AM    1 tablet in PM Week 4: [place] 2 tablets in AM     2 tablets in PM 70 tablet 0      Past Medical History:    Allergic rhinitis    Arthritis    Asthma (HCC)    Fibromyalgia    High blood pressure    Hypercholesterolemia    Migraines    Obesity    PONV (postoperative nausea and vomiting)    Sleep apnea    CPAP PRESCRIBED - PT DOES NOT USE D/T CLAUSTROPHOBIA      Past Surgical History:   Procedure Laterality Date    Carpal tunnel release Bilateral     Colonoscopy  2017    At outside facility reported as normal per patient    Foot surgery Right 12/2021    Knee replacement surgery      Knee surgery Right     Laps gstr rstcv px plmt band      Sinus surgery        Tubal ligation        Family History   Problem Relation Age of Onset    Cancer Mother         breast ca    Migraines Father    no fh of lupus.   1 brother and 1 sister -   2 cousins have MS.    Social History:  Social History     Socioeconomic History    Marital status: Single   Tobacco Use    Smoking status: Never    Smokeless tobacco: Never    Tobacco comments:     None   Vaping Use    Vaping status: Never Used   Substance and Sexual Activity    Alcohol use: Never    Drug use: Never   Other Topics Concern    Caffeine Concern Yes    Exercise No   Social History Narrative    The patient  does not use an assistive device..      The patient does live in a home with stairs.     Social Determinants of Health     Financial Resource Strain: Medium Risk (4/17/2024)    Received from [facility]    Overall Financial Resource Strain (CARDIA)     Difficulty of Paying Living Expenses: Somewhat hard   Food Insecurity: Patient Declined (4/17/2024)    Received from [facility]    Hunger Vital Sign     Worried About Running Out of Food in the Last Year: Patient declined     Ran Out of Food in the Last Year: Patient declined   Transportation Needs: No Transportation Needs (4/17/2024)    Received from [facility]    PRAPARE - Transportation     Lack of Transportation (Medical): No     Lack of Transportation (Non-Medical): No   Physical Activity: Inactive (5/25/2023)    Received from [facility]    Exercise Vital Sign     Days of Exercise per Week: 0 days     Minutes of Exercise per Session: 0 min   Stress: No Stress Concern Present (5/25/2023)    Received from [facility]    [place] [employer] Occup[employer] - Occupational Stress Questionnaire     Feeling of Stress : Not at all   Social Connections: Moderately Integrated (5/25/2023)    Received from [facility]    Social Connection and Isolation Panel [NHANES]     Frequency of Communication with Friends and Family: More than three times a week     Frequency of Social Gatherings with Friends and Family: Once a week     Attends Voodoo Services: More than 4 times per year     Active Member of Clubs or Organizations: Yes     Attends Club or Organization Meetings: More than 4 times per year     Marital Status:    Housing Stability: Low Risk  (4/17/2024)    Received from [facility]    Housing Stability Vital Sign     Unable to Pay for Housing in the Last Year: No     Number of Places Lived in the Last Year: 1     In the last 12  months, was there a time when you did not have a steady place to sleep or slept in a shelter (including now)?: No      Single, 1 son - 26 years old,   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   And also in the past was working in high school at [employer]       REVIEW OF SYSTEMS:   Review Of Systems:  Fatigue  Constitutional:No fever, no change in weight or appetitie  Derm:  rashes, no oral ulcers, no alopecia, general hair loss,  no photosensitivity, no psoriasis  Gets bruises on her arms mostly - they bleeding easily - noticing over 1 year -   HEENT:  dry eyes, no dry mouth, no Raynaud's, no nasal ulcers, no parotid swelling, sometimes  neck pain, no jaw pain, no temple pain  Eyes: No visual changes,   CVS: No chest pain, no heart disease  RS: No SOB, no Cough, No Pleurtic pain,   GI: No nausea, no vomiiting, no abominal pain, no hx of ulcer, no gastritis, no heartburn, no dyshpagia, no BRBPR or melena  : no dysuria, gets lots of UTIs that she doenst' know she has - no hx of miscarriages, no DVT Hx, no hx of OCP,   Neuro: left arm  numbness or tingling,, her feet are numbwhen she wakes up,  Migraines - once to twice a month - lasts 1 week, depoprovera and takes maxalt and this helped get it under control - had migraines for a long time,  headache, no hx of seizures,   Psych: no hx of anxiety or depression  ENDO: no hx of thyroid disease, no hx of DM  Joint/Muscluskeltal: see HPI, no lower back pain,   All other ROS are negative.     EXAM:   BP (!) 135/94   Pulse 105   Resp 16   Ht 5' 1\" (1.549 m)   Wt 205 lb (93 kg)   BMI 38.73 kg/m²   HEENT: Clear oropharynx, no oral ulcers, EOM intact, clear sclear, PERRLA, pleasant, no acute distress, no CAD, no neck tendnerness, good ROM,   CVS: RRR, no murmurs  RS: CTAB, no crackles, no rhonchi  ABD: Soft Non tender, no HSM felt, BS positive  Joint exam:   Tender points - in lower legs improved   Tender  in 1-5th mcps and pips - no syvnoitis seen   No upper extremities tenderness.   No lower back tenderness  No neck tenderness   Squeeze test negative   Healed bruise over left pretibial area -  Healed bruise over left forearm -   Scattered pinpoint lesions that were bleeding per pt - now healed.       Component      Latest Ref Rng & Units 2/6/2020   MYELOPEROX ANTIBODIES, IGG      0 - 19 AU/mL 0   SERINE PROTEASE3, IGG      0 - 19 AU/mL 5   B. BURGDORFERI, IGG WB      Negative Positive (A)   B. BURGDORFERI AB, IGM BY WB      Negative Negative   JENNY Titer/Pattern      <80 160 (A)   Reviewed By:       Sascha Medrano M.D.   Anti-Sjogren's A      Negative Negative   Anti-Sjogren's B      Negative Negative   Anti-Smith Antibody      Negative Negative   Anti-Wheeler/RNP Antibody      Negative Negative   Cardiolipin Antibody, IgA      0 - 11 APL 0   Cardiolipin IgG Antibody      0.0 - 14.9 GPL 4.7   Cardiolipin IgM Antibody      0.0 - 12.4 MPL 15.7 (H)   RHEUMATOID FACTOR      <15 IU/mL <10   JENNY SCREEN WITH REFLEX (S)      Negative Positive (A)   Lyme Screen IgG and IgM      Negative Equivocal   Gliadin Deamidated IgG Ab      <7.0 U/mL 0.5   Tissue Transglutaminase IgA Ab      <7.0 U/mL 0.2   ENDOMYSIAL ANTIBODY, IGA BY IF      <1:10 <1:10   Gliadin Deamidated IgA Ab      <7.0 U/mL 18.0 (H)   IMMUNOGLOBULIN A      70.00 - 312.00 mg/dL 132.00   Anti Double Strand DNA      <10 <10       Component      Latest Ref Rng & Units 1/17/2022   B. BURGDORFERI, IGG WB      Negative Positive (A)   B. BURGDORFERI AB, IGM BY WB      Negative Negative   ALDOLASE, SERUM      1.5 - 8.1 U/L 5.3   CK      26 - 192 U/L 67   C-REACTIVE PROTEIN      <0.30 mg/dL 1.14 (H)   SED RATE      0 - 30 mm/Hr 24   Lyme Screen IgG and IgM      Negative Positive (A)               Component      Latest Ref Rng & Units 1/17/2022   B. BURGDORFERI, IGG WB      Negative Positive (A)   B. BURGDORFERI AB, IGM BY WB      Negative Negative   ALDOLASE, SERUM      1.5 -  8.1 U/L 5.3   CK      26 - 192 U/L 67   C-REACTIVE PROTEIN      <0.30 mg/dL 1.14 (H)   SED RATE      0 - 30 mm/Hr 24   Lyme Screen IgG and IgM      Negative Positive (A)                         Component      Latest Ref Rng 2/10/2023   C-REACTIVE PROTEIN      <0.30 mg/dL 0.71 (H)    SED RATE      0 - 30 mm/Hr 50 (H)       Component      Latest Ref Rng 4/24/2023   Result Comment    Desmoglein (Dsg) 1      U/ml 10.1    Desmoglein (Dsg) 3      U/ml 7.2    Clinical Relevance Notes    Electronically Signed By Comment    SED RATE      0 - 30 mm/Hr 10    C-REACTIVE PROTEIN      <0.30 mg/dL <0.29        Component      Latest Ref Rng 3/26/2024   WBC      4.0 - 11.0 x10(3) uL 5.4    RBC      3.80 - 5.30 x10(6)uL 4.42    Hemoglobin      12.0 - 16.0 g/dL 12.3    Hematocrit      35.0 - 48.0 % 38.8    MCV      80.0 - 100.0 fL 87.8    MCH      26.0 - 34.0 pg 27.8    MCHC      31.0 - 37.0 g/dL 31.7    RDW      11.0 - 15.0 % 13.7    RDW-SD      35.1 - 46.3 fL 44.3    Platelet Count      150.0 - 450.0 10(3)uL 199.0    CREATININE      0.55 - 1.02 mg/dL 0.85    EGFR      >=60 mL/min/1.73m2 76    ALT (SGPT)      10 - 49 U/L 12    SED RATE      0 - 30 mm/Hr 34 (H)    C-REACTIVE PROTEIN      <1.00 mg/dL <0.40    AST (SGOT)      <=34 U/L 22       Legend:  (H) High  1/16/2024 - wbc is 8.09, hb is 11.8, plt are 259   Cmp - cr is 0.93, ast is 25, alt is 26,             ASSESSMENT AND PLAN:   Sarahi Jones is a 64 year old female who presents for   Chief Complaint   Patient presents with    Follow - Up     Undifferentiated inflammatory arthritis    Medication Follow-Up    Lab Results       1. Fibromyalgia/osteoarthritis - joitn pain jayce in legs , elevated sed rateand crp   - possibly seronegative RA - overalap  Doing better on savella - back on this 25mg twice a day  -   tried  going off for 2 months with no improvement of her skin bleeding .   On savella she is at least - this is working for her - she feels 40-50% better -   - stopped  around 1/9/2023 - - restaretd in 5/2023 - having trouble now with the shipmenets   Cont. savella 25mg bid   - cont.amitriptylien 20mg at night   tramdaol 50mg prn   - cont.Lefunomide 10mg every other day - tapered due to elevated LFTS - now normal  - but now elevated sed rate again and more stiffess  But she has more inflammation now - and stffness is worse.   Tried methotrexate 10mg a week and fa 1mg a day  - was  too groggy -   Add hydroxychlroquine 200mg twice a day   Discussed risks and benefits of medication with patient , including retinal toxicity risk and importance of regular monitoring on the medication.   - rtc in 3-4 months.   Tramadol usually given by pcp - I will refill and take over - since she states she hasn't seen her pcp in a long time -     In the past:   She has had no luck with any other medications helping her.  Was on savella in the past - now not able to take b/c of her insurance coverage, can't afford it - b/c it's on a different tier, seh tried pt. assitance with dr. García,   Tried hcq for 1month - and then stopped it - b/c savella is working for her.   In the past hcq never helped her.   No improvement on  LDN 4.5mg a day    No better on dulxoetine 30mg a day - not better with  30mg twice a day -   In the past treid gabapentin- weight gain, lyrica didn't help,         Would try since all other options have failed and this has given some succes to patients.   Can't get good rx or assiteance for savella   Takes tizandine - helps with arm pain - doesn't help with leg pains    2. brusiing over legs - getting better.   with bleeding into the skin with hx of lupus anticoagulation -   Garnder-mliadys syndrome -   Follow up dr. Garcia at [facility] -     brusiing of skin has been going on for months - longer than starting the savella, savella has causes some brusiing for her in the past   The bleeding into the skin is toally new in the last few months.   Tried stopping savella for 2 weeks but  the brusinign continued. So she is back on savella - but still has bruises   - off  savella for at least 2 months = stopped 1/9/2023 - now off for 3 months   Started on leflunomide - and feels bruising and bleeding is worsening but overall it's been stabe on this     -not  pemphigous - ? Foliaceus type desmoglein 1 and 3negative    [place] and [facility]ology [facility] [facility] (unable to review records)   Saw [place] again in 10/2022   Saw Dr. Mena at [place] in 11/2022 -   Seen dermatology a few times.     She's not on warfarin that causes skin necrosis or nsaids that cause pseudoreaction in the sun   Lupus anticoagulant usually not assoactiaed with this kind of brusiing   It's not a typical look for vasculitis eitehr   She doesn't decribe bullous lesions seen with pemphigoid or TEN either -  y  Doesn't appear like pyoderma gangrenosum   Appears like nsaids related pseudoreactoin but she is off all nadis.         3. undifferentiated connective tissue disease -  - Positive JENNY 1:320 and lupus anticoagulant - no raynaud's, easy bruising in arms , hx of migraines   Elevated esr and crp-  Slightly elevated esr and crp -   Work up for lupus has been negative per rheumatology at [place]   Hx of hcq - did not help her -   -d/w her to try methotrexate - she declined - b/c of hair loss possiblity   - stopped  leflunomide every 10mg every other  day - started in 1/2023- doing better on this til 11/2023  Now back on it since 12/2023   Asked her to T[place] again to every other day due to hx of elevated lfts - -   Off methotrexaye 10mg a  weel and fa 1mg a day- too groggy with this.   Add back hcq 200mg bid - she tried it for 1 month in the past - d/w her to give it at least 2 month s  Discussed risks and benefits of medication with patient , including retinal toxicity risk and importance of regular monitoring on the medication.     Rtc in 3-4  months   ;abs in 3-4 months     She feels  bruising is worse on this - it is also helping the pain   - cont. For now - will see if bruising improves off savella for a longer time and see if leflunomide helps with the pain and inflammation    Hematology follow b/c of bruising - no clear etiology - not releated to lupus anticoaglunat at this time.     4. Right knee osteoarthritis - s/p right knee arthroplasty - in 10/2018  Still hurts   Takes tramadol 50mg a day   Tried diclofenac and celebrex in the past.   meloxicam didn't help.   Diclofenac helped a little bit. VOLTAREN GEL 1 % topical helped a litlte bit.   Had sx at [place] - will be disucssing with Dr. Villa Call to see if she needs another sx.       5. Left shoulder pain - better with steroid injection in 9/2021 - assc numbness and tingling.      6. Lyme disease - in 2/2020 - pos. Burgdorferi. Saw ID - treated with abx 1 month of doxycycline. She felt better after the oral abx but then felt bad again.   She was not able to return to [facility] b/c of Covid lockdown happened in 3/2020. Never got the disucssion if she needed IV abx.   Has numbness in legs til she gets motivated.   She had EMG   She feels her feet and legs started around the time she was dx with lymes.   [facility] 2/10/2022 - they did not recommend further treatment = -  Has another follow up -   She has appointment. With [facility]  - not keen on going on iv abx     7. Neuropathy /headaches - on amitriptyline 10mg at night - increase to 20mg a day - since 25mg makes her too drowsy     8. Hx of candida esophagitis - in 12/2022 - egd - treatedw ith anti fungal      Summary:  1.  Cont.  leflunomide 10mg but take it every other day   2. Cont. amitrpityline 20mg at night  3. Cont.  Savella 25mg twice a day in the future  - Pt. Assistance form for savella filled out - today -   4. . Cont. Tramadol 50mg a day as needed.   5.. Cont. tizandine for arm pain as needed  6.  Return to clinic in 3-4 month  7.   Check labs in 3-4 months.   8. Add  hydroxychlroquine 200mg twice a day     [email or web address]    -consider CBD oil    Shara Michelle MD  4/22/2024   12:01 PM

## 2024-04-26 NOTE — TELEPHONE ENCOUNTER
Received fax from my "[employer] Assist requesting Medicare Provider's Address on medication assistance application for [place]. Phoned [facility] and spoke to Fatmata to get clarification of information needed. Ferchoregulo asked for [facility] address; informed her we did not have it on file but if needed we'd call Customer Service. Then she asked if pt had Pharmacy Benefits; informed her of the information in pt chart. Fatmata then said she had all info needed and a decision would be made in 1-2 business days.

## 2024-05-24 DIAGNOSIS — G43.009 MIGRAINE WITHOUT AURA AND WITHOUT STATUS MIGRAINOSUS, NOT INTRACTABLE: ICD-10-CM

## 2024-05-24 NOTE — TELEPHONE ENCOUNTER
LOV: 4/22/24  Last Refilled:#180, 1rf 1/13/24  Summary:  1.  Cont.  leflunomide 10mg but take it every other day   2. Cont. amitrpityline 20mg at night  3. Cont.  Savella 25mg twice a day in the future  - Pt. Assistance form for savella filled out - today -   4. . Cont. Tramadol 50mg a day as needed.   5.. Cont. tizandine for arm pain as needed  6.  Return to clinic in 3-4 month  7.   Check labs in 3-4 months.   8. Add hydroxychlroquine 200mg twice a day      [email or web address]     -consider CBD oil     Shara Michelle MD  4/22/2024   Please advise.

## 2024-05-25 RX ORDER — AMITRIPTYLINE HYDROCHLORIDE 10 MG/1
20 TABLET, FILM COATED ORAL NIGHTLY
Qty: 180 TABLET | Refills: 1 | Status: SHIPPED | OUTPATIENT
Start: 2024-05-25

## 2024-05-31 NOTE — TELEPHONE ENCOUNTER
Requested Prescriptions     Pending Prescriptions Disp Refills    TIZANIDINE 2 MG Oral Tab [Pharmacy Med Name: TIZANIDINE HCL 2 MG TABLET] 270 tablet 1     Sig: TAKE 1 TABLET BY MOUTH EVERY 8 HOURS AS NEEDED.     Future Appointments   Date Time Provider Department Center   6/17/2024 11:10 AM [facility]   7/15/2024  3:50 PM Shara Michelle MD [facility]   7/30/2024  3:00 PM Randolph Jalloh MD [facility]   7/31/2024  3:30 PM Beau Lane MD [facility]     LOV: 4/22/24   Last Refilled:3/4/24 #180 1RF   Labs:  Component      Latest Ref Rng 3/26/2024   WBC      4.0 - 11.0 x10(3) uL 5.4    RBC      3.80 - 5.30 x10(6)uL 4.42    Hemoglobin      12.0 - 16.0 g/dL 12.3    Hematocrit      35.0 - 48.0 % 38.8    MCV      80.0 - 100.0 fL 87.8    MCH      26.0 - 34.0 pg 27.8    MCHC      31.0 - 37.0 g/dL 31.7    RDW      11.0 - 15.0 % 13.7    RDW-SD      35.1 - 46.3 fL 44.3    Platelet Count      150.0 - 450.0 10(3)uL 199.0    CREATININE      0.55 - 1.02 mg/dL 0.85    EGFR      >=60 mL/min/1.73m2 76    ALT (SGPT)      10 - 49 U/L 12    SED RATE      0 - 30 mm/Hr 34 (H)    C-REACTIVE PROTEIN      <1.00 mg/dL <0.40    AST (SGOT)      <=34 U/L 22       Legend:  (H) High  Summary:  1.  Cont.  leflunomide 10mg but take it every other day   2. Cont. amitrpityline 20mg at night  3. Cont.  Savella 25mg twice a day in the future  - Pt. Assistance form for savella filled out - today -   4. . Cont. Tramadol 50mg a day as needed.   5.. Cont. tizandine for arm pain as needed  6.  Return to clinic in 3-4 month  7.   Check labs in 3-4 months.   8. Add hydroxychlroquine 200mg twice a day      [email or web address]     -consider CBD oil     Shara Michelle MD  4/22/2024   12:01 PM

## 2024-06-03 RX ORDER — TIZANIDINE 2 MG/1
2 TABLET ORAL EVERY 8 HOURS PRN
Qty: 270 TABLET | Refills: 1 | Status: SHIPPED | OUTPATIENT
Start: 2024-06-03

## 2024-07-15 ENCOUNTER — LAB ENCOUNTER (OUTPATIENT)
Dept: LAB | Facility: HOSPITAL | Age: 64
End: 2024-07-15
Attending: INTERNAL MEDICINE
Payer: MEDICARE

## 2024-07-15 ENCOUNTER — OFFICE VISIT (OUTPATIENT)
Dept: RHEUMATOLOGY | Facility: CLINIC | Age: 64
End: 2024-07-15
Payer: MEDICARE

## 2024-07-15 VITALS
HEART RATE: 103 BPM | SYSTOLIC BLOOD PRESSURE: 92 MMHG | DIASTOLIC BLOOD PRESSURE: 63 MMHG | WEIGHT: 190 LBS | BODY MASS INDEX: 35.87 KG/M2 | HEIGHT: 61 IN

## 2024-07-15 DIAGNOSIS — R23.3 BLEEDING INTO THE SKIN: ICD-10-CM

## 2024-07-15 DIAGNOSIS — M06.4 UNDIFFERENTIATED INFLAMMATORY ARTHRITIS (HCC): ICD-10-CM

## 2024-07-15 DIAGNOSIS — M06.4 UNDIFFERENTIATED INFLAMMATORY ARTHRITIS (HCC): Primary | ICD-10-CM

## 2024-07-15 DIAGNOSIS — Z51.81 THERAPEUTIC DRUG MONITORING: ICD-10-CM

## 2024-07-15 PROBLEM — E66.01 SEVERE OBESITY (BMI 35.0-39.9) WITH COMORBIDITY (HCC): Chronic | Status: ACTIVE | Noted: 2024-07-15

## 2024-07-15 PROBLEM — J44.89 ASTHMA WITH COPD (CHRONIC OBSTRUCTIVE PULMONARY DISEASE) (HCC): Chronic | Status: ACTIVE | Noted: 2024-07-15

## 2024-07-15 LAB
ALBUMIN SERPL-MCNC: 4.5 G/DL (ref 3.2–4.8)
ALP LIVER SERPL-CCNC: 76 U/L
ALT SERPL-CCNC: 9 U/L
AST SERPL-CCNC: 17 U/L (ref ?–34)
BILIRUB DIRECT SERPL-MCNC: 0.2 MG/DL (ref ?–0.3)
BILIRUB SERPL-MCNC: 0.5 MG/DL (ref 0.2–1.1)
CRP SERPL-MCNC: <0.4 MG/DL (ref ?–1)
ERYTHROCYTE [SEDIMENTATION RATE] IN BLOOD: 22 MM/HR
PROT SERPL-MCNC: 7.2 G/DL (ref 5.7–8.2)

## 2024-07-15 PROCEDURE — 3074F SYST BP LT 130 MM HG: CPT | Performed by: INTERNAL MEDICINE

## 2024-07-15 PROCEDURE — 3008F BODY MASS INDEX DOCD: CPT | Performed by: INTERNAL MEDICINE

## 2024-07-15 PROCEDURE — 36415 COLL VENOUS BLD VENIPUNCTURE: CPT

## 2024-07-15 PROCEDURE — 3078F DIAST BP <80 MM HG: CPT | Performed by: INTERNAL MEDICINE

## 2024-07-15 PROCEDURE — G2211 COMPLEX E/M VISIT ADD ON: HCPCS | Performed by: INTERNAL MEDICINE

## 2024-07-15 PROCEDURE — 85652 RBC SED RATE AUTOMATED: CPT

## 2024-07-15 PROCEDURE — 86140 C-REACTIVE PROTEIN: CPT

## 2024-07-15 PROCEDURE — 80076 HEPATIC FUNCTION PANEL: CPT

## 2024-07-15 PROCEDURE — 99214 OFFICE O/P EST MOD 30 MIN: CPT | Performed by: INTERNAL MEDICINE

## 2024-07-15 NOTE — PROGRESS NOTES
Sarahi Jones is a 64 year old female who presents for   Chief Complaint   Patient presents with    Follow - Up     Undifferentiated inflammatory arthritis    Medication Follow-Up   .   HPI:     I had the pleasure of seeing Sarahi Jones on 1/17/2022 for evaluation.     She is a pleasant 61 year old who has a positive JENNY 1:320 and positive lupus anticoagulant. She has ongoing joitn pain . She was dx with more osteoarthritis and fibromyalgia at [facility] rheumatology , Dr. Gómez  in 9/2021.   She was referred by Dr. Biggs.   She was dx with positive Lupus anticoagulant 3-4 years ago.   She started getting joint pain around 3-4 years ago as well.   She went on medicare in 11/2021.   She was on savella for 2 months and it was helping her pains. And then when she switched insurance she was no longer able to afford or get savella b/c of it's new tier.   She went off celebrex  at that  Time. She was on diclofenac but stopped when she got a foot surgery.     She was tried on gabapentin with neurologist, dr. domínguez  For her migraines but that didn't help.   She recently now on amitprityline 10mg at night which helps. occl she takes 20mg but it makes her feel dopey.   She tried cymbalta/duloxetine in the past but it didn't help her.     She saw dr. Hinojosa, rheumatologist in the past - 2 years ago.  She was offered plaquenil but she didn't want to take.   She takes a muscle relaxer, tizandine for her shoulder but it only helps her on and off.   She did recently get a steroid shot for her left shoulder at [place] in 9/2021 and she's been off the tizandine. She recently is getting the numbness and tingling in her left shoudler.     She has pain with walking and standing up - 3/10 pain.   She has pain mostly in her legs and feet. -   When she gets up in the morning - it starts. Diclofenac would help a little bit . So did the savella.   When she works, as a  the pain starts again.   She feels in half a day she  gets too sore.     She gets bruising in her arms - she was told it was solar purpura by dermatologist at [facility] in 9/2021 -she has gone off nsaids and still getting it.   She does see hematologist Dr. Mcqueen at [facility].     Her right foot sx was done by Dr. Carson in 12/10/2021 - at [facility]. She did not want sx at [facility].     Is on statin   Taking tramadol as needed for right knee pain     2/11/2022    lymes test positive as expected. Filling out report - but this was likely sent in 2/2020.   Pt. Asked what to do - she cont. To have parasthesias in legs - and was not able to see ID afterwards b/c of pandemic   Refer again to ID - for possibly IV abs -   Clarified from notes that she took 1 month of doxycycyline.   Will have ID decide if she still needs IV abx for her neuropathy as neurology was the one to initially check this test.       She saw infecious disease - at [facility] infectious disease. She was seen by Dr. Hand. Told it was too late for a course of abx.   She doesn't feel beter on dulxoetine 30mg a day. - she feels tired with it.     She hasn't heard back from pt. assistenace from savella - this helped her but she is on a higher tier now.   She's 3/10 pain - she's on amitritpylien for her neruopathy.   When she is walking.       3/10/2022  savella is not covered.   She never tried LDN  She felt dulxoetine did not help her.   She tried diclofenac again -     She sees hematologist - posiitive lupus anti coagulant -     4/12/2022 -   LDN didn't help.   She saw oringinal ID doctor recently. She is going to start a month of oral abx then if that didn' thelp she was going to put on iv abx for month.     She is wondering since she hit her deductible if she can get savella now.   She is still on tizandine, tramadol and amitriptyline .     6/13/2022  She is back on savella 12.5mg twice a day.   She needs assitance program - b/c it's $199 a month   It seems to be working.   She's still taking  celebrex.   She stopped abx - and it didn't work for a couple of weeks. She talked to ID and lymes probably there for a long time. So she was offered iv abx but she declined for now .     9/13/2022  She applied for the assitance program and she qualified . She has been on savella 25mg bid. She bought a onth of her own.   She's been on this for 2 months.   She's been walking in the pool til labor day. She is hoping she will get into a indoor pool.   She feels the pain I sworse in the morning. The pain is 3-4/10 pain. During the day it's better.   The pain is much better than what it was.     shes' taking amitpriyline 10mg at night - not 20mg - not sleeping as well with this. The migraines are better.   She's off celebrex. She's off nsaids b/c of skin bruises and bleeds.   She is doing better.     Her knees nerves were ablated 2 weeks ago - so she feels better from this as well.   Overall she is better.   She is on tramadol - requests I put it in -       10/7/2022  She is bruising really bad and bleeding badly.   She is totally off nsaids - b/c it started 6 months ago.   The brusiing is getting worse.   It's more bleeding now.   A couple of weeks aog she cut back on the savella 25mg a day.   She saw hematology at [place] and told to stop the savella.   She also saw pcp - - aPTT slightly elevated.     She's not on a blood thinner.     She went to [facility] in 8/2022 and dis labs at [place] .   The hepatologist was seen -   The dermatologist was seen - no biopsy was done  She is vit c deficient.     12/14/2022  Still getting the bruising. She did see dermatology - it's all leo her legs and arms -   She saw [place] and chaz - told it was thin skin - not felt it was from medications.   She had her allergy meds helds.   She went off the savella for 2 weeks and this did not decrease her bruising  She's back on the savella. She's on 25mg twice a day.     She has about 2-3/10 pain. With the change in the weather.     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   She is getting another opinoin in [place] derm and planning to see [facility] as mir.     She's off water pill and still getting bruising.     1/9/2023  Her bruising on her skin is getting worse.   She was told by another dermatologist to stop savella for 2 months.   She has cont bleeding off the think skin after 2-3 weeks.   It's over her arms.     So she would like to stop the savella.   She is interested in treatments that would help while she tries to go off.     2/10/2023  She has a large wound after a broom hit it and ripped the skin off.   She went to ER at [facility] and they sent her to wound clinic  She is on the leflunomide 10mg a day and the pain is not better - it seems t work during the day and then it gets worse as the day goes on.   Her bruising is worse - but her majar bruise was 3 weeks ago - so no new major bruises in the last 3 weeks.   She has seen derm and couldn't not see [facility].     She has new brusies -like on her arms but non have bled out like the other ones.     Her pain is 2-3/10 pain. Since the savella iti's better overall - now she's on leflunoimde -  but it's more noticeable in the afternoon going up and down the stairs.   She fell on her right knee as well and it's sore with the fall 3 weeks ago.       4/24/2023  sh'es' moving better on leflunomide 10mg a day -   By mid afternoon she is worse again   But her rashes are worse - and skin bleeds are wors.e   She had to goot [facility] wound clinic for a skin bleed on her calf. She saw dr. estrada at [place] - once a week for 10 weeks and this helaed her right leg wound.   But now e has a left leg scan   She feels on lelufnomide she is bleeding more and bruising more.     She's offf savlla for 6 months.     6/14/2023   Bruising is much better - she is sitting in the sun. She saw derm at [place] again on 6/1 /2023 -     Called pt. On 5/1/2023 -  And left message that  pemphigus labs nomral - can consider siwthcing back to savella or adding savella on to help with the pain   Both leflunomide and savella are helping her with the pain -     The bruising is better in the sun.   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 Told that it was no real cure for this.     She's on lelfunomide 10mg a day and savella 25mg a day.   She went for injection for her neckk in [place] 2 weeks ago - and it's not better yet - it was better for a couple of days - but her hands are still numb .   She was walking in the water when it was warm out.     She has leg and hands are bothering her - and it's raining so the pain is more -     9/18/2023    She's really tired.   She still has the bursiing.  She went down on the leflunomide 10mg every other day.   She's feeling better on the savella 25mg bid. She's better at work.   She is better after increasing the savella.      Still Works parti time - as  on the weekends.    11/6/2023  Needs renewal for savella.     She's doing alright.   She felt her bruising was getting worse and now she feels it's getting a little better.   It's two spots on the right and a few on the left arms.   She has 3-4/10 pain.  She had foot sx on Friday. She is got screws placed for right foot toe correction    1/22/2024  She quit the methotreate - not even a month. - B/c she felt too groggy.- too much daze -   She feels the weather is making her worse in the cold.   She's been back on lelfunomide 10mg a day for 1 yvette   Liver tests 1/16/2024 was normal.   She has about 4-5/10 -   The bruising is still better.   [facility] has long sleeves and long socks and not hitting things   Not working as much b/c slow season.     4/22/2024  She hasn't gotten savella since October and November -   Good rx is still over $300 a month.   Her pain is bad in the morning and late at night.   She feels if she goes to the bathroom then her  joints loosen up.   Closer to the end of the day , it's hard to get up.   She has low energy.   She has about 3-4/10 pain.     7/15/2024  The pain pain is ok - 3/10 pain - walked in the water today   Still bruising and bad fatigue   The fatigue is still bad -   She just got the savella approved - was on this - got her own script to tide her over   She is depressed about the bursiing       Wt Readings from Last 2 Encounters:   07/15/24 190 lb (86.2 kg)   06/17/24 194 lb (88 kg)     Body mass index is 35.9 kg/m².      Current Outpatient Medications   Medication Sig Dispense Refill    semaglutide-weight management 1.7 MG/0.75ML Subcutaneous Solution Auto-injector Inject 0.75 mL (1.7 mg total) into the skin once a week. 3 mL 0    TIZANIDINE 2 MG Oral Tab TAKE 1 TABLET BY MOUTH EVERY 8 HOURS AS NEEDED. 270 tablet 1    pantoprazole 40 MG Oral Tab EC Take 1 tablet (40 mg total) by mouth 2 (two) times daily.      AMITRIPTYLINE 10 MG Oral Tab TAKE 2 TABLETS BY MOUTH NIGHTLY 180 tablet 1    Milnacipran HCl 25 MG Oral Tab Take 25 mg by mouth in the morning and 25 mg before bedtime. 180 tablet 3    leflunomide 10 MG Oral Tab Take 1 tablet (10 mg total) by mouth every other day. 45 tablet 1    famotidine 20 MG Oral Tab Take 1 tablet (20 mg total) by mouth nightly. 90 tablet 1    Fremanezumab-vfrm (AJOVY) 225 MG/1.5ML Subcutaneous Solution Prefilled Syringe Inject 225 mg into the skin every 3 (three) months. 3 each 3    RIZATRIPTAN BENZOATE 10 MG Oral Tab Take 1 tablet (10 mg total) by mouth daily. 9 tablet 11    amitriptyline 25 MG Oral Tab Take 1 tablet (25 mg total) by mouth nightly. 90 tablet 3    furosemide 20 MG Oral Tab Take 1 tablet (20 mg total) by mouth 2 (two) times daily.      fluticasone propionate 50 MCG/ACT Nasal Suspension 2 sprays by Nasal route daily.      metoprolol succinate ER 25 MG Oral Tablet 24 Hr Take 1 tablet (25 mg total) by mouth daily.      Potassium Chloride ER 20 MEQ Oral Tab CR Take 1 tablet by  mouth daily with food.      Tretinoin 0.05 % External Cream Apply topically nightly.      traMADol 50 MG Oral Tab Take 1 tablet (50 mg total) by mouth daily as needed. 30 tablet 5    ibuprofen 800 MG Oral Tab       Vitamin D3, Cholecalciferol, 25 MCG (1000 UT) Oral Tab       rosuvastatin 10 MG Oral Tab Take 1 tablet (10 mg total) by mouth every evening.      Levocetirizine Dihydrochloride 5 MG Oral Tab Take 1 tablet (5 mg total) by mouth once as needed.      Montelukast Sodium 10 MG Oral Tab Take 1 tablet (10 mg total) by mouth daily.  2    PROAIR  (90 Base) MCG/ACT Inhalation Aero Soln USE 1-2 PUFFS EVERY 4-6 HOURS AS NEEDED FOR SHORTNESS OF BREATH. AND 15-30 MIN BEFORE STRENOUS ACTIV  2    EPINEPHrine 0.3 MG/0.3ML Injection Solution Auto-injector Inject 0.3 mL (1 each total) into the muscle.      Meloxicam 15 MG Oral Tab Take 15 mg by mouth daily. (Patient not taking: Reported on 7/15/2024)      hydroxychloroquine 200 MG Oral Tab Take 1 tablet (200 mg total) by mouth 2 (two) times daily. (Patient not taking: Reported on 5/28/2024) 180 tablet 1      Past Medical History:    Allergic rhinitis    Arthritis    Asthma (HCC)    Fibromyalgia    High blood pressure    Hypercholesterolemia    Migraines    Obesity    PONV (postoperative nausea and vomiting)    Sleep apnea    CPAP PRESCRIBED - PT DOES NOT USE D/T CLAUSTROPHOBIA      Past Surgical History:   Procedure Laterality Date    Carpal tunnel release Bilateral     Colonoscopy  2017    At outside facility reported as normal per patient    Foot surgery Right 12/2021    Knee replacement surgery      Knee surgery Right     Laps gstr rstcv px plmt band      Sinus surgery        Tubal ligation        Family History   Problem Relation Age of Onset    Cancer Mother         breast ca    Migraines Father    no fh of lupus.   1 brother and 1 sister -   2 cousins have MS.    Social History:  Social History     Socioeconomic History    Marital status: Single   Tobacco Use     Smoking status: Never    Smokeless tobacco: Never    Tobacco comments:     None   Vaping Use    Vaping status: Never Used   Substance and Sexual Activity    Alcohol use: Never    Drug use: Never   Other Topics Concern    Caffeine Concern Yes    Exercise No   Social History Narrative    The patient does not use an assistive device..      The patient does live in a home with stairs.     Social Determinants of Health     Financial Resource Strain: Low Risk  (7/10/2024)    Received from [facility]    Overall Financial Resource Strain (CARDIA)     Difficulty of Paying Living Expenses: Not very hard   Recent Concern: Financial Resource Strain - Medium Risk (4/17/2024)    Received from [facility]    Overall Financial Resource Strain (CARDIA)     Difficulty of Paying Living Expenses: Somewhat hard   Food Insecurity: No Food Insecurity (7/10/2024)    Received from [facility]    Hunger Vital Sign     Worried About Running Out of Food in the Last Year: Never true     Ran Out of Food in the Last Year: Never true   Transportation Needs: No Transportation Needs (7/10/2024)    Received from [facility]    PRAPARE - Transportation     Lack of Transportation (Medical): No     Lack of Transportation (Non-Medical): No   Physical Activity: Inactive (5/25/2023)    Received from [facility]    Exercise Vital Sign     Days of Exercise per Week: 0 days     Minutes of Exercise per Session: 0 min   Stress: No Stress Concern Present (5/25/2023)    Received from [facility]    [employer] - Occupational Stress Questionnaire     Feeling of Stress : Not at all   Social Connections: Moderately Integrated (5/25/2023)    Received from [facility]    Social Connection and Isolation Panel [NHANES]     Frequency of Communication with Friends and Family: More than three times a week     Frequency  of Social Gatherings with Friends and Family: Once a week     Attends Worship Services: More than 4 times per year     Active Member of Clubs or Organizations: Yes     Attends Club or Organization Meetings: More than 4 times per year     Marital Status:    Housing Stability: Low Risk  (7/10/2024)    Received from [facility]    Housing Stability Vital Sign     Unable to Pay for Housing in the Last Year: No     Number of Places Lived in the Last Year: 1     In the last 12 months, was there a time when you did not have a steady place to sleep or slept in a shelter (including now)?: No      Single, 1 son - 26 years old,   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   And also in the past was working in high school at [employer]       REVIEW OF SYSTEMS:   Review Of Systems:  Fatigue  Constitutional:No fever, no change in weight or appetitie  Derm:  rashes, no oral ulcers, no alopecia, general hair loss,  no photosensitivity, no psoriasis  Gets bruises on her arms mostly - they bleeding easily - noticing over 1 year -   HEENT:  dry eyes, no dry mouth, no Raynaud's, no nasal ulcers, no parotid swelling, sometimes  neck pain, no jaw pain, no temple pain  Eyes: No visual changes,   CVS: No chest pain, no heart disease  RS: No SOB, no Cough, No Pleurtic pain,   GI: No nausea, no vomiiting, no abominal pain, no hx of ulcer, no gastritis, no heartburn, no dyshpagia, no BRBPR or melena  : no dysuria, gets lots of UTIs that she doenst' know she has - no hx of miscarriages, no DVT Hx, no hx of OCP,   Neuro: left arm  numbness or tingling,, her feet are numbwhen she wakes up,  Migraines - once to twice a month - lasts 1 week, depoprovera and takes maxalt and this helped get it under control - had migraines for a long time,  headache, no hx of seizures,   Psych: no hx of anxiety or depression  ENDO: no hx of thyroid disease, no hx of  DM  Joint/Muscluskeltal: see HPI, no lower back pain,   All other ROS are negative.     EXAM:   BP 92/63 (BP Location: Left arm, Patient Position: Sitting, Cuff Size: adult)   Pulse 103   Ht 5' 1\" (1.549 m)   Wt 190 lb (86.2 kg)   BMI 35.90 kg/m²   HEENT: Clear oropharynx, no oral ulcers, EOM intact, clear sclear, PERRLA, pleasant, no acute distress, no CAD, no neck tendnerness, good ROM,   CVS: RRR, no murmurs  RS: CTAB, no crackles, no rhonchi  ABD: Soft Non tender, no HSM felt, BS positive  Joint exam:   Tender points - in lower legs improved   Tender in 1-5th mcps and pips - no syvnoitis seen   No upper extremities tenderness.   No lower back tenderness  No neck tenderness   Squeeze test negative   Healed bruise over left  and right pretibial area -  Healed bruise over left forearm -   Scattered pinpoint lesions that were bleeding per pt - now healed.       Component      Latest Ref Rng & Units 2/6/2020   MYELOPEROX ANTIBODIES, IGG      0 - 19 AU/mL 0   SERINE PROTEASE3, IGG      0 - 19 AU/mL 5   B. BURGDORFERI, IGG WB      Negative Positive (A)   B. BURGDORFERI AB, IGM BY WB      Negative Negative   JENNY Titer/Pattern      <80 160 (A)   Reviewed By:       Sascha Medrano M.D.   Anti-Sjogren's A      Negative Negative   Anti-Sjogren's B      Negative Negative   Anti-Smith Antibody      Negative Negative   Anti-Wheeler/RNP Antibody      Negative Negative   Cardiolipin Antibody, IgA      0 - 11 APL 0   Cardiolipin IgG Antibody      0.0 - 14.9 GPL 4.7   Cardiolipin IgM Antibody      0.0 - 12.4 MPL 15.7 (H)   RHEUMATOID FACTOR      <15 IU/mL <10   JENNY SCREEN WITH REFLEX (S)      Negative Positive (A)   Lyme Screen IgG and IgM      Negative Equivocal   Gliadin Deamidated IgG Ab      <7.0 U/mL 0.5   Tissue Transglutaminase IgA Ab      <7.0 U/mL 0.2   ENDOMYSIAL ANTIBODY, IGA BY IF      <1:10 <1:10   Gliadin Deamidated IgA Ab      <7.0 U/mL 18.0 (H)   IMMUNOGLOBULIN A      70.00 - 312.00 mg/dL 132.00   Anti Double  Strand DNA      <10 <10       Component      Latest Ref Rng & Units 1/17/2022   B. BURGDORFERI, IGG WB      Negative Positive (A)   B. BURGDORFERI AB, IGM BY WB      Negative Negative   ALDOLASE, SERUM      1.5 - 8.1 U/L 5.3   CK      26 - 192 U/L 67   C-REACTIVE PROTEIN      <0.30 mg/dL 1.14 (H)   SED RATE      0 - 30 mm/Hr 24   Lyme Screen IgG and IgM      Negative Positive (A)               Component      Latest Ref Rng & Units 1/17/2022   B. BURGDORFERI, IGG WB      Negative Positive (A)   B. BURGDORFERI AB, IGM BY WB      Negative Negative   ALDOLASE, SERUM      1.5 - 8.1 U/L 5.3   CK      26 - 192 U/L 67   C-REACTIVE PROTEIN      <0.30 mg/dL 1.14 (H)   SED RATE      0 - 30 mm/Hr 24   Lyme Screen IgG and IgM      Negative Positive (A)                         Component      Latest Ref Rng 2/10/2023   C-REACTIVE PROTEIN      <0.30 mg/dL 0.71 (H)    SED RATE      0 - 30 mm/Hr 50 (H)       Component      Latest Ref Rng 4/24/2023   Result Comment    Desmoglein (Dsg) 1      U/ml 10.1    Desmoglein (Dsg) 3      U/ml 7.2    Clinical Relevance Notes    Electronically Signed By Comment    SED RATE      0 - 30 mm/Hr 10    C-REACTIVE PROTEIN      <0.30 mg/dL <0.29          7/10/24 10:01 AM    WBC  3.5 - 10.5 K/UL 7.9   RBC  3.80 - 5.20 M/UL 4.65   HGB  11.5 - 15.5 GM/DL 13.4   HCT  34.0 - 46.5 % 42.4   MCV  82.0 - 99.0 FL 91.2   MCH  27.0 - 34.0 PG 28.8   MCHC  32.0 - 36.0 GM/DL 31.6 Low    RDW  11.0 - 15.0 % 14.7   PLT CNT  150 - 400 K/           ASSESSMENT AND PLAN:   Sarahi Jones is a 64 year old female who presents for   Chief Complaint   Patient presents with    Follow - Up     Undifferentiated inflammatory arthritis    Medication Follow-Up       1. Fibromyalgia/osteoarthritis - joitn pain jayce in legs , elevated sed rateand crp   - possibly seronegative RA - overalap  Doing better on savella - back on this 25mg twice a day  -just started -   tried  going off for 2 months with no improvement of her skin  bleeding .   On savella she is at least - this is working for her - she feels 40-50% better -   - stopped around 1/9/2023 - - restaretd in 5/2023 - having trouble now with the shipmenets   Cont. savella 25mg bid   - cont.amitriptylien 20mg at night   tramdaol 50mg prn   - cont.Lefunomide 10mg every other day - tapered due to elevated LFTS - now normal  - but now elevated sed rate again and more stiffess  But she has more inflammation now - and stffness is worse.   Tried methotrexate 10mg a week and fa 1mg a day  - was  too groggy -   Tried - hydroxychlroquine 200mg twice a day - smita not help her -stopped  after 2 months.   Tramadol usually given by pcp - I will refill and take over - since she states she hasn't seen her pcp in a long time -     In the past:   She has had no luck with any other medications helping her.  Was on savella in the past - now not able to take b/c of her insurance coverage, can't afford it - b/c it's on a different tier, se tried pt. assitance with dr. García,   Tried hcq for 1month - and then stopped it - b/c savella is working for her.   In the past hcq never helped her.   No improvement on  LDN 4.5mg a day    No better on dulxoetine 30mg a day - not better with  30mg twice a day -   In the past treid gabapentin- weight gain, lyrica didn't help,         Would try since all other options have failed and this has given some succes to patients.   Can't get good rx or assiteance for savella   Takes tizandine - helps with arm pain - doesn't help with leg pains    2. brusiing over legs - getting better.   with bleeding into the skin with hx of lupus anticoagulation -   Garnder-miladys syndrome -   Consider if her rashes are related to her lymes   [facility]. Garcia at  [facility] -     brusiing of skin has been going on for months - longer than starting the savella, savella has causes some brusiing for her in the past   The bleeding into the skin is toally new in the last few months.   Tried  stopping savella for 2 weeks but the brusinign continued. So she is back on savella - but still has bruises   - off  savella for at least 2 months = stopped 1/9/2023 - went off  off for 3 months   Started on leflunomide - and feels bruising and bleeding is worsening but overall it's been stabe on this     -not  pemphigous - ? Foliaceus type desmoglein 1 and 3negative    [place] [facility] [place]) and hematology last week at [facility] (unable to review records)   Saw [facility] again in 10/2022   Saw Dr. Mena at [place] in 11/2022 -   Seen dermatology a few times.     She's not on warfarin that causes skin necrosis or nsaids that cause pseudoreaction in the sun   Lupus anticoagulant usually not assoactiaed with this kind of brusiing   It's not a typical look for vasculitis eitehr   She doesn't decribe bullous lesions seen with pemphigoid or TEN either -  y  Doesn't appear like pyoderma gangrenosum   Appears like nsaids related pseudoreactoin but she is off all nadis.         3. undifferentiated connective tissue disease -  - Positive JENNY 1:320 and lupus anticoagulant - no raynaud's, easy bruising in arms , hx of migraines   Elevated esr and crp-  Slightly elevated esr and crp -   Work up for lupus has been negative per rheumatology at [place]   Hx of hcq - did not help her -   -d/w her to try methotrexate - she declined - b/c of hair loss possiblity   - stopped  leflunomide every 10mg every other  day - started in 1/2023- doing better on this til 11/2023  Now back on it since 12/2023   Asked her to Taper again to every other day due to hx of elevated lfts - -   Off methotrexaye 10mg a  weel and fa 1mg a day- too groggy with this.   Add back hcq 200mg bid - she tried it for 1 month in the past - d/w her to give it at least 2 month s  Discussed risks and benefits of medication with patient , including retinal toxicity risk and importance of regular monitoring on the medication.     Rtc in 3-4  months    ;abs in 3-4 months     She feels bruising is worse on this - it is also helping the pain   - cont. For now - will see if bruising improves off savella for a longer time and see if leflunomide helps with the pain and inflammation    Hematology follow b/c of bruising - no clear etiology - not releated to lupus anticoaglunat at this time.     4. Right knee osteoarthritis - s/p right knee arthroplasty - in 10/2018  Still hurts   Takes tramadol 50mg a day   Tried diclofenac and celebrex in the past.   meloxicam didn't help.   Diclofenac helped a little bit. VOLTAREN GEL 1 % topical helped a litlte bit.   Had sx at [place] - will be disucssing with Dr. King to see if she needs another sx.       5. Left shoulder pain - better with steroid injection in 9/2021 - assc numbness and tingling.      6. Lyme disease - in 2/2020 - pos. Burgdorferi. Saw ID - treated with abx 1 month of doxycycline. She felt better after the oral abx but then felt bad again.   She was not able to return to [facility] b/c of Covid lockdown happened in 3/2020. Never got the disucssion if she needed IV abx.   Has numbness in legs til she gets motivated.   She had EMG   She feels her feet and legs started around the time she was dx with lymes.   [facility] on 2/10/2022 - they did not recommend further treatment = -  Has another follow up -   [facility]  - not keen on going on iv abx     7. Neuropathy /headaches - on amitriptyline 10mg at night - increase to 20mg a day - since 25mg makes her too drowsy     8. Hx of candida esophagitis - in 12/2022 - egd - treatedw ith anti fungal      Summary:  1.  Cont.  leflunomide 10mg every other day   2.Cont.  Savella 25mg twice a day in the future  - Pt. Assistance form for savella filled out in 4/2024 -   3.  Cont. amitrpityline 20mg at night  4. . Cont. Tramadol 50mg a day as needed.   5.. Cont. tizandine for arm pain as needed  6.  Return to clinic in 4 month  7.   Check labs in  September - , 4 months.     [email or web address]    -consider CBD oil    Shara Michelle MD  7/15/2024   4:41 PM     is applicable because the patient's medical record notes reflects the ongoing nature of the continuous longitudinal relationship of care, and the medical record indicates that there is ongoing treatment of a serious/complex medical condition.

## 2024-07-15 NOTE — PATIENT INSTRUCTIONS
1.  Cont.  leflunomide 10mg every other day   2.Cont.  Savella 25mg twice a day in the future  - Pt. Assistance form for savella filled out in 4/2024 -   3.  Cont. amitrpityline 20mg at night  4. . Cont. Tramadol 50mg a day as needed.   5.. Cont. tizandine for arm pain as needed  6.  Return to clinic in 4 month  7.   Check labs in September - , 4 months.

## 2024-07-26 ENCOUNTER — OFFICE VISIT (OUTPATIENT)
Dept: NEUROLOGY | Facility: CLINIC | Age: 64
End: 2024-07-26
Payer: MEDICARE

## 2024-07-26 DIAGNOSIS — G43.009 MIGRAINE WITHOUT AURA AND WITHOUT STATUS MIGRAINOSUS, NOT INTRACTABLE: ICD-10-CM

## 2024-07-26 PROCEDURE — 99213 OFFICE O/P EST LOW 20 MIN: CPT | Performed by: OTHER

## 2024-07-26 RX ORDER — AMITRIPTYLINE HYDROCHLORIDE 25 MG/1
25 TABLET, FILM COATED ORAL NIGHTLY
Qty: 90 TABLET | Refills: 3 | Status: SHIPPED | OUTPATIENT
Start: 2024-07-26

## 2024-07-26 RX ORDER — RIZATRIPTAN BENZOATE 10 MG/1
10 TABLET ORAL DAILY
Qty: 9 TABLET | Refills: 11 | Status: SHIPPED | OUTPATIENT
Start: 2024-07-26

## 2024-07-26 RX ORDER — TRIAMCINOLONE ACETONIDE 1 MG/G
1 CREAM TOPICAL DAILY
COMMUNITY
Start: 2024-06-19 | End: 2024-07-19

## 2024-07-26 RX ORDER — ESCITALOPRAM OXALATE 10 MG/1
5 TABLET ORAL DAILY
COMMUNITY
Start: 2024-07-10 | End: 2024-07-26

## 2024-07-26 RX ORDER — SODIUM, POTASSIUM,MAG SULFATES 17.5-3.13G
1 SOLUTION, RECONSTITUTED, ORAL ORAL AS DIRECTED
COMMUNITY

## 2024-07-26 NOTE — PROGRESS NOTES
Neurology follow-up visit     Referred By: Dr. Gutiérrez ref. provider found    Chief Complaint:   Chief Complaint   Patient presents with    Migraine     LOV 10/23/2023 Pt presents today for migraines without aura. No change in migraines. Reports about 4 migraines per month. Denies nausea/vomiting. Weather is a trigger. Patient request refills.       HPI:     Sarahi Jones is a 64 year old female, who presents for trouble with walking, pain, headaches.  Apparently patient has had for a number of years problems with her waking up in the morning and feeling very stiff in her feet and ankles, it takes 2 hours for her to start moving and feeling better.  But also if she spends a lot of time on her feet it hurts especially in her right knee.  She has been seen by orthopedic specialist, apparently no obvious problem with the knee itself was found.  She also finds herself limping all the time on the right side.  No bladder control issues.  Also some numbness in her feet but more so pain in her feet.  She had an EMG of her right lower extremities done, slightly decreased amplitude of the superficial peroneal nerve and may be his mom decreased recruitment in the iliopsoas muscle and therefore femoral and superficial peroneal neuropathy were considered but they were quite mild.      Also patient had history of migraines.  She is been seen by multiple neurologists in the past, she remembers being on Topamax, she does not go back on it.  She was on Lyrica and gabapentin in the past for pain in her feet.  She was also prescribed Seroquel by her rheumatologist apparently, was unclear the reason.  She did have MRI of the lumbar spine done that showed some degenerative changes but relatively mild in nature.  She was taking tramadol, diclofenac and some other pain medications on a regular basis.    For migraines she was most recently prescribed Ajovy her gynecologist, and with that and Maxalt she was able to manage her migraines much  better, she was getting them maybe twice a month and able to abort them very quickly with Maxalt.    - MRI BRAIN (CPT=70551); Future  - MRI SPINE CERVICAL (CPT=72141); Future  - MRI SPINE THORACIC (CPT=72146); Future  - JENNY,DIRECT,REFLEX TITER + SPECIFIC ANTIBODIES; Future  - ANTICARDIOLIPIN AB, IGA, QN  - ANTICARDIOLIPIN AB, IGG, QN  - ANTICARDIOLIPIN AB, IGM, QN  - CBC WITH DIFFERENTIAL WITH PLATELET  - IMMUNOFIXATION [ MARIANNA]; Future  - HOMOCYSTEINE; Future  - MPO/SD-3 ANCA ANTIBODIES; Future  - NEUTROPHIL ASSOCIATED AB; Future  - PARANEOPLASTIC AUTOAB EVAL; Future  - RHEUMATOID ARTHRITIS FACTOR  - VITAMIN B12  - CELIAC DISEASE AB EXPANDED PNL; Future  - LYME DISEASE, TOTAL AB W RFLX; Future    Testing was done, she was positive for both Lyme disease and some antibodies for celiac disease were positive from the panel.  Patient followed up with GI doctor, she is scheduled for EGD.  It seems that most likely she will require a gluten-free diet.    She also follow-up with infectious disease doctor and apparently was confirmed that she did have Lyme disease and that she needs to be on antibiotics.    In the meantime gabapentin was somewhat helpful for her pain in her feet and legs and no side effects.  Ajovy seems to have been helpful to decrease frequency of migraines to about 2 or 3 times a month, and was able to abort them easily with Maxalt     Follow-up in June 2020 she was reporting new development of few weeks pain shooting down from her left shoulder into her left upper arm.  Still having symptoms in her legs when she wakes up especially she was at that point on 300 milligrams of gabapentin 3 times a day.  At that point we increased the dose of gabapentin.  Patient also was referred to physiatry and continued with physical therapy.  Patient continued with Ajovy.    Patient came back for follow-up in June 2021.  She is still dealing with headaches.  That time she was describing them coming in cluster for about a  week of headache and 3 weeks of no headaches.  And there was 5 days or 7 days of headache she would use the Maxalt up to twice a day.  She also was complaining of worsening of quality of sleep that might have been contributing to her increased headaches.  In the meantime she stopped gabapentin since it was unhelpful and causing some weight gain.    Amitriptyline was added to Maxalt and Ajovy.  Patient came back for follow-up in January 2022.    Point patient also was seen at [facility], diagnosed with fibro myalgia, was placed on Savella.  That was somewhat helpful.  However with her new insurance, Medicare, it was not covering neither Savella nor Ajovy.    Patient continues with that treatment and came back for follow-up in October 2023.  Headaches were still relatively well controlled, but her sleep was getting worse.  She was interested in trying to go up on the dose of amitriptyline to 25 mg.  Also patient was reporting and some years ago should had cervical injections for her cervical radiculopathy at [facility] but wanted to establish with somebody locally to consider continuing with treatment    Patient came back for follow-up in July 2024, doing relatively well, still headaches were relatively well-controlled.  She was tolerating amitriptyline and Savella at the same time.  She was educated about possibility of serotonin syndrome.  Symptoms were discussed and the immediate medical attention was advised if any of those symptoms occur.    Past Medical History:    Allergic rhinitis    Arthritis    Asthma (HCC)    Fibromyalgia    High blood pressure    Hypercholesterolemia    Migraines    Obesity    PONV (postoperative nausea and vomiting)    Sleep apnea    CPAP PRESCRIBED - PT DOES NOT USE D/T CLAUSTROPHOBIA       Past Surgical History:   Procedure Laterality Date    Carpal tunnel release Bilateral     Colonoscopy  2017    At outside facility reported as normal per patient    Foot surgery Right 12/2021     Knee replacement surgery      Knee surgery Right     Laps gstr rstcv px plmt band      Sinus surgery        Tubal ligation         Social history:  History   Smoking Status    Never   Smokeless Tobacco    Never       History   Alcohol Use Never       History   Drug Use Unknown         Family History   Problem Relation Age of Onset    Cancer Mother         breast ca    Migraines Father          Current Outpatient Medications:     amitriptyline 25 MG Oral Tab, Take 1 tablet (25 mg total) by mouth nightly., Disp: 90 tablet, Rfl: 3    RIZATRIPTAN BENZOATE 10 MG Oral Tab, Take 1 tablet (10 mg total) by mouth daily., Disp: 9 tablet, Rfl: 11    Na Sulfate-K Sulfate-Mg Sulf 17.5-3.13-1.6 GM/177ML Oral Solution, Take 1 tablet by mouth As Directed., Disp: , Rfl:     semaglutide-weight management 1.7 MG/0.75ML Subcutaneous Solution Auto-injector, Inject 0.75 mL (1.7 mg total) into the skin once a week., Disp: 3 mL, Rfl: 0    TIZANIDINE 2 MG Oral Tab, TAKE 1 TABLET BY MOUTH EVERY 8 HOURS AS NEEDED., Disp: 270 tablet, Rfl: 1    pantoprazole 40 MG Oral Tab EC, Take 1 tablet (40 mg total) by mouth 2 (two) times daily., Disp: , Rfl:     Meloxicam 15 MG Oral Tab, Take 15 mg by mouth daily. (Patient not taking: Reported on 7/15/2024), Disp: , Rfl:     Milnacipran HCl 25 MG Oral Tab, Take 25 mg by mouth in the morning and 25 mg before bedtime., Disp: 180 tablet, Rfl: 3    hydroxychloroquine 200 MG Oral Tab, Take 1 tablet (200 mg total) by mouth 2 (two) times daily. (Patient not taking: Reported on 5/28/2024), Disp: 180 tablet, Rfl: 1    leflunomide 10 MG Oral Tab, Take 1 tablet (10 mg total) by mouth every other day., Disp: 45 tablet, Rfl: 1    famotidine 20 MG Oral Tab, Take 1 tablet (20 mg total) by mouth nightly., Disp: 90 tablet, Rfl: 1    Fremanezumab-vfrm (AJOVY) 225 MG/1.5ML Subcutaneous Solution Prefilled Syringe, Inject 225 mg into the skin every 3 (three) months., Disp: 3 each, Rfl: 3    furosemide 20 MG Oral Tab, Take 1  tablet (20 mg total) by mouth 2 (two) times daily., Disp: , Rfl:     fluticasone propionate 50 MCG/ACT Nasal Suspension, 2 sprays by Nasal route daily., Disp: , Rfl:     metoprolol succinate ER 25 MG Oral Tablet 24 Hr, Take 1 tablet (25 mg total) by mouth daily., Disp: , Rfl:     Potassium Chloride ER 20 MEQ Oral Tab CR, Take 1 tablet by mouth daily with food., Disp: , Rfl:     Tretinoin 0.05 % External Cream, Apply topically nightly., Disp: , Rfl:     traMADol 50 MG Oral Tab, Take 1 tablet (50 mg total) by mouth daily as needed., Disp: 30 tablet, Rfl: 5    ibuprofen 800 MG Oral Tab, , Disp: , Rfl:     Vitamin D3, Cholecalciferol, 25 MCG (1000 UT) Oral Tab, , Disp: , Rfl:     rosuvastatin 10 MG Oral Tab, Take 1 tablet (10 mg total) by mouth every evening., Disp: , Rfl:     Levocetirizine Dihydrochloride 5 MG Oral Tab, Take 1 tablet (5 mg total) by mouth once as needed., Disp: , Rfl:     Montelukast Sodium 10 MG Oral Tab, Take 1 tablet (10 mg total) by mouth daily., Disp: , Rfl: 2    PROAIR  (90 Base) MCG/ACT Inhalation Aero Soln, USE 1-2 PUFFS EVERY 4-6 HOURS AS NEEDED FOR SHORTNESS OF BREATH. AND 15-30 MIN BEFORE STRENOUS ACTIV, Disp: , Rfl: 2    EPINEPHrine 0.3 MG/0.3ML Injection Solution Auto-injector, Inject 0.3 mL (1 each total) into the muscle., Disp: , Rfl:     Allergies   Allergen Reactions    Bee Venom ANAPHYLAXIS, HIVES, RASH, SHORTNESS OF BREATH, Tightness in Chest and WHEEZING    Egg NAUSEA AND VOMITING, SHORTNESS OF BREATH and WHEEZING    Iodine (Topical) PALPITATIONS    Shrimp ANAPHYLAXIS and TONGUE SWELLING    Cefuroxime RASH       ROS:   As in HPI, the rest of the 14 system review was done and was negative      Physical Exam:  There were no vitals filed for this visit.    General: No apparent distress, well nourished, well groomed.  Head- Normocephalic, atraumatic  Eyes- No redness or swelling  ENT- Hearing intake, normal glutition  Neck- No masses or adenopathy    Neurological:     Mental  Status- Alert and oriented x3.  Normal attention span and concentration  Thought process intact  Memory intact- recent and remote  Mood intact  Fund of knowledge appropriate for education and age    Language intact including: comprehension, naming, repetition, vocabulary    Cranial Nerves:    VII. Face symmetric, no facial weakness  VIII. Hearing intact to whisper.  IX. Pallet elevates symmetrically.  XI. Shoulder shrug is intact  XII. Tongue is midline    Motor Exam:  Muscle tone normal  No atrophy or fasciculations  Strength- upper extremities 5/5 proximally and distally  Rapid alternating movements intact    Gait:  Limping right side gait, possibly antalgic    Labs:    No results found for: \"TSH\"  No results found for: \"CHOL\", \"HDL\", \"LDL\", \"TRIG\"  Lab Results   Component Value Date    HGB 12.3 03/26/2024    HCT 38.8 03/26/2024    MCV 87.8 03/26/2024    WBC 5.4 03/26/2024    .0 03/26/2024      Lab Results   Component Value Date    BUN 7 (L) 10/31/2023    CA 9.4 10/31/2023    ALT 9 (L) 07/15/2024    AST 17 07/15/2024    ALB 4.5 07/15/2024     10/31/2023    K 3.4 (L) 10/31/2023     10/31/2023    CO2 28.0 10/31/2023      I have reviewed labs.    Imaging Studies:  I have independently reviewed imaging.  I have reviewed the EMG tracings independently, as above    MRI of the brain, cervical and thoracic spines were reviewed, some mild degenerative changes and nonspecific white matter changes noted but no obvious explanation for her symptoms.    Assessment   1.  Paresthesia  Continue with amitriptyline.  25 mg at night.  Brand-name rizatriptan also will be refilled.    2. Cervical radiculopathy     3. Migraine without aura and without status migrainosus, not intractable  Better controlled  Patient will be continued with Ajovy and Maxalt, she can only tolerate small dose of amitriptyline.           Education and counseling provided to patient. Instructed patient to call my office or seek medical  attention immediately if symptoms worsen.  Patient verbalized understanding of information given. All questions were answered. All side effects of drugs were discussed.     Return to clinic in: Return in about 1 year (around 7/26/2025).    Beau Lane MD

## 2024-07-30 PROBLEM — D69.2 SOLAR PURPURA: Status: ACTIVE | Noted: 2024-07-11

## 2024-07-30 PROBLEM — D69.2 SOLAR PURPURA (HCC): Status: ACTIVE | Noted: 2024-07-11

## 2024-08-12 DIAGNOSIS — G43.009 MIGRAINE WITHOUT AURA AND WITHOUT STATUS MIGRAINOSUS, NOT INTRACTABLE: ICD-10-CM

## 2024-08-12 RX ORDER — RIZATRIPTAN BENZOATE 10 MG/1
10 TABLET ORAL DAILY
Qty: 9 TABLET | Refills: 11 | Status: SHIPPED | OUTPATIENT
Start: 2024-08-12

## 2024-08-12 NOTE — TELEPHONE ENCOUNTER
Requested Prescriptions     Pending Prescriptions Disp Refills    RIZATRIPTAN BENZOATE 10 MG Oral Tab [Pharmacy Med Name: RIZATRIPTAN 10 MG TABLET] 9 tablet 11     Sig: TAKE 1 TABLET BY MOUTH EVERY DAY     Last OV: 7/26/2024 with a Return in about 1 year (around 7/26/2025).   Next OV:     [place]/;

## 2024-09-04 RX ORDER — FAMOTIDINE 20 MG/1
20 TABLET, FILM COATED ORAL NIGHTLY
Qty: 90 TABLET | Refills: 1 | Status: SHIPPED | OUTPATIENT
Start: 2024-09-04

## 2024-09-04 NOTE — TELEPHONE ENCOUNTER
Requested Prescriptions     Pending Prescriptions Disp Refills    FAMOTIDINE 20 MG Oral Tab [Pharmacy Med Name: FAMOTIDINE 20 MG TABLET] 90 tablet 1     Sig: TAKE 1 TABLET BY MOUTH EVERY DAY AT NIGHT     LOV: 12/27/2023  Last Refill: 03/11/2024

## 2024-09-04 NOTE — TELEPHONE ENCOUNTER
Dr Blanton     Spoke to the patient and she stated that the heartburn is till there but not as bad and is controlling it with knowing what foods trigger it and staying away from those foods    She stated that her PCP stated that she might need another EGD but the patient doesn't feel like one is needed at this time but would wait to see how you feel about this and what you would advise

## 2024-09-04 NOTE — TELEPHONE ENCOUNTER
If the patient's symptoms have improved and she does not have any difficulty swallowing, we can forego the repeat EGD at this time.  Prescription refilled.

## 2024-09-30 ENCOUNTER — OFFICE VISIT (OUTPATIENT)
Facility: CLINIC | Age: 64
End: 2024-09-30
Payer: MEDICARE

## 2024-09-30 VITALS
BODY MASS INDEX: 35.3 KG/M2 | SYSTOLIC BLOOD PRESSURE: 111 MMHG | DIASTOLIC BLOOD PRESSURE: 76 MMHG | WEIGHT: 187 LBS | HEIGHT: 61 IN

## 2024-09-30 DIAGNOSIS — Z98.84 LAP-BAND SURGERY STATUS: ICD-10-CM

## 2024-09-30 DIAGNOSIS — Z80.0 FAMILY HISTORY OF COLON CANCER: ICD-10-CM

## 2024-09-30 DIAGNOSIS — K21.9 GASTROESOPHAGEAL REFLUX DISEASE WITHOUT ESOPHAGITIS: Primary | ICD-10-CM

## 2024-09-30 DIAGNOSIS — R12 HEARTBURN: ICD-10-CM

## 2024-09-30 DIAGNOSIS — Z86.0100 HISTORY OF COLON POLYPS: ICD-10-CM

## 2024-09-30 PROCEDURE — 3078F DIAST BP <80 MM HG: CPT | Performed by: INTERNAL MEDICINE

## 2024-09-30 PROCEDURE — 3008F BODY MASS INDEX DOCD: CPT | Performed by: INTERNAL MEDICINE

## 2024-09-30 PROCEDURE — 99213 OFFICE O/P EST LOW 20 MIN: CPT | Performed by: INTERNAL MEDICINE

## 2024-09-30 PROCEDURE — 3074F SYST BP LT 130 MM HG: CPT | Performed by: INTERNAL MEDICINE

## 2024-09-30 NOTE — PROGRESS NOTES
Subjective:   Patient ID: Sarahi Jones is a 64 year old female.    HPI  The patient returns in follow-up.  She was last seen in December 2023.     As per previous notes the patient has a family history of colon cancer in her mother over the age of 60 and a personal history of adenomatous colon polyps.  The patient's last colonoscopy in December 2022 revealed #2 adenomatous polyps including a 1 cm adenoma and uncomplicated sigmoid colon diverticulosis.  A 3-year surveillance colonoscopy was advised (December 2025).     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  She has had gastroesophageal reflux symptoms.  Endoscopy in 2020 and December 2022 revealed surgical changes post the gastroplasty and stromal tumor excision and Candida esophagitis.  The latter was treated with fluconazole.     In March 2023 the patient had recurrent symptoms of heartburn.  She was advised to continue taking pantoprazole twice daily.  Famotidine was added at bedtime.  An esophagram was ordered which revealed a mildly dilated distal esophagus and hyperperistalsis of the esophagus with to and fro motion of contrast, retained food material and secretions distally and a high-grade \"stomal stenosis\".  The patient was advised to see her bariatric surgeon regarding having fluid removed from the band or having the band removed.     The patient visited her bariatric surgeon who withdrew fluid from the band reservoir.  Previous notes allude to no improvement in symptoms.  At the time of the patient's last visit the possibility of a coexistent pill induced esophagitis secondary to clindamycin.  Possibility of band removal was discussed.  Repeat upper endoscopy was to be entertained if the symptoms continued.    At the time of the patient's last visit the symptoms had improved, however, nocturnal heartburn had recurred.  The patient was advised to increase pantoprazole to  40 mg twice daily before meals and add Zantac OTC at bedtime.     Current history:  About 3 weeks prior the patient noted that her voice \"disappeared\" associated with shooting pains up to her ears.  She saw ENT and was diagnosed with reflux.  She has also been experiencing \"squeezing pains\" in the chest \"shooting up to my head\" (epigastrium radiating to the chest and ears).  The patient was evaluated in the ED.  Cardiopulmonary evaluation was negative (the patient has a history of a lupus anticoagulant).  The possibility of symptoms due to reflux or biliary colic were entertained.    Prior to the above symptoms the patient had been reasonably well.  She states that she had further fluid taken out of the Lap-Band reservoir about 2 weeks ago.  She believes that the majority of the fluid is \"out\".    The aforementioned symptoms last for about 15 minutes and resolve with lying down.  She denies dysphagia.  She can experience regurgitation of mucus when she has the pain.  No vomiting.    The patient is taking omeprazole in the morning and Zantac at bedtime.  She is no longer taking meloxicam and takes an occasional dose of ibuprofen.  She continues on amitriptyline.    She was drinking 1 cup of coffee in the morning which she eliminated.  She has an occasional diet soda.  She has had no bowel issues.       History/Other:   Review of Systems  See above    Wt Readings from Last 6 Encounters:   09/30/24 187 lb (84.8 kg)   09/26/24 185 lb (83.9 kg)   07/30/24 190 lb (86.2 kg)   07/15/24 190 lb (86.2 kg)   06/17/24 194 lb (88 kg)   05/28/24 194 lb (88 kg)       Current Outpatient Medications   Medication Sig Dispense Refill    semaglutide 8 MG/3ML Subcutaneous Solution Pen-injector Inject 2 mg into the skin once a week.      FAMOTIDINE 20 MG Oral Tab TAKE 1 TABLET BY MOUTH EVERY DAY AT NIGHT 90 tablet 1    RIZATRIPTAN BENZOATE 10 MG Oral Tab TAKE 1 TABLET BY MOUTH EVERY DAY 9 tablet 11    Na Sulfate-K Sulfate-Mg Sulf  17.5-3.13-1.6 GM/177ML Oral Solution Take 1 tablet by mouth As Directed.      amitriptyline 25 MG Oral Tab Take 1 tablet (25 mg total) by mouth nightly. 90 tablet 3    TIZANIDINE 2 MG Oral Tab TAKE 1 TABLET BY MOUTH EVERY 8 HOURS AS NEEDED. 270 tablet 1    pantoprazole 40 MG Oral Tab EC Take 1 tablet (40 mg total) by mouth 2 (two) times daily.      Meloxicam 15 MG Oral Tab Take 1 tablet (15 mg total) by mouth daily.      Milnacipran HCl 25 MG Oral Tab Take 25 mg by mouth in the morning and 25 mg before bedtime. 180 tablet 3    hydroxychloroquine 200 MG Oral Tab Take 1 tablet (200 mg total) by mouth 2 (two) times daily. 180 tablet 1    leflunomide 10 MG Oral Tab Take 1 tablet (10 mg total) by mouth every other day. 45 tablet 1    Fremanezumab-vfrm (AJOVY) 225 MG/1.5ML Subcutaneous Solution Prefilled Syringe Inject 225 mg into the skin every 3 (three) months. 3 each 3    furosemide 20 MG Oral Tab Take 1 tablet (20 mg total) by mouth 2 (two) times daily.      fluticasone propionate 50 MCG/ACT Nasal Suspension 2 sprays by Nasal route daily.      metoprolol succinate ER 25 MG Oral Tablet 24 Hr Take 1 tablet (25 mg total) by mouth daily.      Potassium Chloride ER 20 MEQ Oral Tab CR Take 1 tablet by mouth daily with food.      Tretinoin 0.05 % External Cream Apply topically nightly.      traMADol 50 MG Oral Tab Take 1 tablet (50 mg total) by mouth daily as needed. 30 tablet 5    ibuprofen 800 MG Oral Tab       Vitamin D3, Cholecalciferol, 25 MCG (1000 UT) Oral Tab       rosuvastatin 10 MG Oral Tab Take 1 tablet (10 mg total) by mouth every evening.      Levocetirizine Dihydrochloride 5 MG Oral Tab Take 1 tablet (5 mg total) by mouth once as needed.      Montelukast Sodium 10 MG Oral Tab Take 1 tablet (10 mg total) by mouth daily.  2    PROAIR  (90 Base) MCG/ACT Inhalation Aero Soln USE 1-2 PUFFS EVERY 4-6 HOURS AS NEEDED FOR SHORTNESS OF BREATH. AND 15-30 MIN BEFORE STRENOUS ACTIV  2    EPINEPHrine 0.3 MG/0.3ML  Injection Solution Auto-injector Inject 0.3 mL (1 each total) into the muscle.       Allergies:  Allergies   Allergen Reactions    Bee Venom ANAPHYLAXIS, HIVES, RASH, SHORTNESS OF BREATH, Tightness in Chest and WHEEZING    Egg NAUSEA AND VOMITING, SHORTNESS OF BREATH and WHEEZING    Iodine (Topical) PALPITATIONS    Shrimp ANAPHYLAXIS and TONGUE SWELLING    Cefuroxime RASH       Objective:   Physical Exam  Vitals and nursing note reviewed.   Constitutional:       General: She is not in acute distress.     Appearance: She is well-developed. She is not ill-appearing, toxic-appearing or diaphoretic.   HENT:      Head: Normocephalic and atraumatic.      Mouth/Throat:      Pharynx: No oropharyngeal exudate.   Eyes:      General: No scleral icterus.     Conjunctiva/sclera: Conjunctivae normal.   Neck:      Thyroid: No thyromegaly.   Cardiovascular:      Rate and Rhythm: Normal rate and regular rhythm.      Heart sounds: Normal heart sounds.   Pulmonary:      Effort: Pulmonary effort is normal. No respiratory distress.      Breath sounds: Normal breath sounds. No wheezing or rales.   Abdominal:      General: Bowel sounds are normal. There is no distension.      Palpations: Abdomen is soft. There is no mass.      Tenderness: There is no abdominal tenderness. There is no guarding or rebound.      Comments: Band reservoir palpable in the right epigastrium   Musculoskeletal:      Cervical back: Neck supple.   Lymphadenopathy:      Cervical: No cervical adenopathy.   Neurological:      Mental Status: She is alert and oriented to person, place, and time.   Psychiatric:         Behavior: Behavior normal.         Assessment & Plan:   1. Gastroesophageal reflux disease without esophagitis    2. Heartburn    3. LAP-BAND surgery status    4. Family history of colon cancer    5. History of colon polyps    The patient presents with ongoing episodes of epigastric/chest pain radiating to the head lasting for 15 minutes in the setting of a  prior lap band placement.  We discussed that the patient's symptoms could be due to gastroesophageal reflux augmented by the Lap-Band.  The patient is also taking semaglutide which could be contributing.  The short nature of the symptoms would speak against biliary colic.  I am recommending that the patient take omeprazole twice daily before meals.  She may continue an H2 antagonist at bedtime.  If symptoms continue options include a repeat upper endoscopy, repeat esophagram, gallbladder ultrasound and consideration for band removal.  A temporary hold of the GLP-1 agonist to assess symptom response is also an option.  The patient will contact me with a symptom update.  She is otherwise due for a screening/surveillance colonoscopy in December 2025.        Meds This Visit:  Requested Prescriptions      No prescriptions requested or ordered in this encounter       Imaging & Referrals:  None

## 2024-10-05 NOTE — PATIENT INSTRUCTIONS
1.  Take omeprazole twice daily before meals.  2.  Take famotidine at bedtime.  3.  Please contact me if the symptoms continue.

## 2024-10-17 NOTE — TELEPHONE ENCOUNTER
LOV: 7/15/24  Future Appointments   Date Time Provider Department Center   11/21/2024  9:00 AM Shara Michelle MD [facility] [place]     Labs: AST 14 ALT 9 7/31/24  Summary:  1.  Cont.  leflunomide 10mg every other day   2.Cont.  Savella 25mg twice a day in the future  - Pt. Assistance form for savella filled out in 4/2024 -   3.  Cont. amitrpityline 20mg at night  4. . Cont. Tramadol 50mg a day as needed.   5.. Cont. tizandine for arm pain as needed  6.  Return to clinic in 4 month  7.   Check labs in September - , 4 months.      [email or web address]     -consider CBD oil     Shara Michelle MD  7/15/2024

## 2024-10-18 RX ORDER — LEFLUNOMIDE 10 MG/1
10 TABLET ORAL EVERY OTHER DAY
Qty: 45 TABLET | Refills: 1 | Status: SHIPPED | OUTPATIENT
Start: 2024-10-18

## 2024-11-14 ENCOUNTER — LAB ENCOUNTER (OUTPATIENT)
Dept: LAB | Age: 64
End: 2024-11-14
Attending: INTERNAL MEDICINE
Payer: MEDICARE

## 2024-11-14 ENCOUNTER — OFFICE VISIT (OUTPATIENT)
Dept: RHEUMATOLOGY | Facility: CLINIC | Age: 64
End: 2024-11-14
Payer: MEDICARE

## 2024-11-14 VITALS — WEIGHT: 193 LBS | HEIGHT: 61 IN | BODY MASS INDEX: 36.44 KG/M2

## 2024-11-14 DIAGNOSIS — Z51.81 THERAPEUTIC DRUG MONITORING: ICD-10-CM

## 2024-11-14 DIAGNOSIS — M06.4 UNDIFFERENTIATED INFLAMMATORY ARTHRITIS (HCC): ICD-10-CM

## 2024-11-14 DIAGNOSIS — M79.7 FIBROMYALGIA: ICD-10-CM

## 2024-11-14 DIAGNOSIS — M06.4 UNDIFFERENTIATED INFLAMMATORY ARTHRITIS (HCC): Primary | ICD-10-CM

## 2024-11-14 LAB
ALT SERPL-CCNC: 20 U/L
AST SERPL-CCNC: 22 U/L (ref ?–34)
CREAT BLD-MCNC: 0.91 MG/DL
CRP SERPL-MCNC: <0.4 MG/DL (ref ?–1)
DEPRECATED RDW RBC AUTO: 48.1 FL (ref 35.1–46.3)
EGFRCR SERPLBLD CKD-EPI 2021: 70 ML/MIN/1.73M2 (ref 60–?)
ERYTHROCYTE [DISTWIDTH] IN BLOOD BY AUTOMATED COUNT: 14.6 % (ref 11–15)
ERYTHROCYTE [SEDIMENTATION RATE] IN BLOOD: 17 MM/HR
HCT VFR BLD AUTO: 37.9 %
HGB BLD-MCNC: 12.3 G/DL
MCH RBC QN AUTO: 29.4 PG (ref 26–34)
MCHC RBC AUTO-ENTMCNC: 32.5 G/DL (ref 31–37)
MCV RBC AUTO: 90.5 FL
PLATELET # BLD AUTO: 259 10(3)UL (ref 150–450)
RBC # BLD AUTO: 4.19 X10(6)UL
WBC # BLD AUTO: 7.5 X10(3) UL (ref 4–11)

## 2024-11-14 PROCEDURE — 86140 C-REACTIVE PROTEIN: CPT

## 2024-11-14 PROCEDURE — G2211 COMPLEX E/M VISIT ADD ON: HCPCS | Performed by: INTERNAL MEDICINE

## 2024-11-14 PROCEDURE — 85652 RBC SED RATE AUTOMATED: CPT

## 2024-11-14 PROCEDURE — 84450 TRANSFERASE (AST) (SGOT): CPT

## 2024-11-14 PROCEDURE — 99214 OFFICE O/P EST MOD 30 MIN: CPT | Performed by: INTERNAL MEDICINE

## 2024-11-14 PROCEDURE — 36415 COLL VENOUS BLD VENIPUNCTURE: CPT

## 2024-11-14 PROCEDURE — 84460 ALANINE AMINO (ALT) (SGPT): CPT

## 2024-11-14 PROCEDURE — 82565 ASSAY OF CREATININE: CPT

## 2024-11-14 PROCEDURE — 3008F BODY MASS INDEX DOCD: CPT | Performed by: INTERNAL MEDICINE

## 2024-11-14 PROCEDURE — 85027 COMPLETE CBC AUTOMATED: CPT

## 2024-11-14 RX ORDER — TIZANIDINE 2 MG/1
2 TABLET ORAL EVERY 8 HOURS PRN
Qty: 270 TABLET | Refills: 1 | Status: SHIPPED | OUTPATIENT
Start: 2024-11-14

## 2024-11-14 RX ORDER — TRAMADOL HYDROCHLORIDE 50 MG/1
50 TABLET ORAL
Qty: 30 TABLET | Refills: 5 | Status: SHIPPED | OUTPATIENT
Start: 2024-11-14

## 2024-11-14 NOTE — PATIENT INSTRUCTIONS
1.  Cont.  leflunomide 10mg every other day   2.Cont.  Savella 25mg twice a day in the future  - Pt. Assistance form for savella will be filled out.   3.  Cont. amitrpityline 20mg at night  4. . Cont. Tramadol 50mg a day as needed.   5.. Cont. tizandine for arm pain as needed  6.  Return to clinic in 4 month  7.   Check labs in in 4 months   8. Ibuprofen as needed. .

## 2024-11-14 NOTE — PROGRESS NOTES
Sarahi Jones is a 64 year old female who presents for   Chief Complaint   Patient presents with    Follow - Up   .   HPI:     I had the pleasure of seeing Sarahi Jones on 1/17/2022 for evaluation.     She is a pleasant 61 year old who has a positive JENNY 1:320 and positive lupus anticoagulant. She has ongoing joitn pain . She was dx with more osteoarthritis and fibromyalgia at [facility] rheumatology , Dr. Gómez  in 9/2021.   She was referred by Dr. Biggs.   She was dx with positive Lupus anticoagulant 3-4 years ago.   She started getting joint pain around 3-4 years ago as well.   She went on medicare in 11/2021.   She was on savella for 2 months and it was helping her pains. And then when she switched insurance she was no longer able to afford or get savella b/c of it's new tier.   She went off celebrex  at that  Time. She was on diclofenac but stopped when she got a foot surgery.     She was tried on gabapentin with neurologist, dr. domínguez  For her migraines but that didn't help.   She recently now on amitprityline 10mg at night which helps. occl she takes 20mg but it makes her feel dopey.   She tried cymbalta/duloxetine in the past but it didn't help her.     She saw dr. Hinjoosa, rheumatologist in the past - 2 years ago.  She was offered plaquenil but she didn't want to take.   She takes a muscle relaxer, tizandine for her shoulder but it only helps her on and off.   She did recently get a steroid shot for her left shoulder at [place] in 9/2021 and she's been off the tizandine. She recently is getting the numbness and tingling in her left shoudler.     She has pain with walking and standing up - 3/10 pain.   She has pain mostly in her legs and feet. -   When she gets up in the morning - it starts. Diclofenac would help a little bit . So did the savella.   When she works, as a  the pain starts again.   She feels in half a day she gets too sore.     She gets bruising in her arms - she was told it  was solar purpura by dermatologist at [facility] in 9/2021 -she has gone off nsaids and still getting it.   She does see hematologist Dr. Mcqueen at [facility].     Her right foot sx was done by Dr. Carson in 12/10/2021 - at [facility]. She did not want sx at [facility].     Is on statin   Taking tramadol as needed for right knee pain     2/11/2022    lymes test positive as expected. Filling out report - but this was likely sent in 2/2020.   Pt. Asked what to do - she cont. To have parasthesias in legs - and was not able to see ID afterwards b/c of pandemic   Refer again to ID - for possibly IV abs -   Clarified from notes that she took 1 month of doxycycyline.   Will have ID decide if she still needs IV abx for her neuropathy as neurology was the one to initially check this test.       She saw infecious disease - at [facility] infectious disease. She was seen by Dr. Hand. Told it was too late for a course of abx.   She doesn't feel beter on dulxoetine 30mg a day. - she feels tired with it.     She hasn't heard back from pt. assistenace from savella - this helped her but she is on a higher tier now.   She's 3/10 pain - she's on amitritpylien for her neruopathy.   When she is walking.       3/10/2022  savella is not covered.   She never tried LDN  She felt dulxoetine did not help her.   She tried diclofenac again -     She sees hematologist - posiitive lupus anti coagulant -     4/12/2022 -   LDN didn't help.   She saw oringinal ID doctor recently. She is going to start a month of oral abx then if that didn' thelp she was going to put on iv abx for month.     She is wondering since she hit her deductible if she can get savella now.   She is still on tizandine, tramadol and amitriptyline .     6/13/2022  She is back on savella 12.5mg twice a day.   She needs assitance program - b/c it's $199 a month   It seems to be working.   She's still taking celebrex.   She stopped abx - and it didn't work for a couple of  weeks. She talked to ID and lymes probably there for a long time. So she was offered iv abx but she declined for now .     9/13/2022  She applied for the assitance program and she qualified . She has been on savella 25mg bid. She bought a onth of her own.   She's been on this for 2 months.   She's been walking in the pool til labor day. She is hoping she will get into a indoor pool.   She feels the pain I sworse in the morning. The pain is 3-4/10 pain. During the day it's better.   The pain is much better than what it was.     shes' taking amitpriyline 10mg at night - not 20mg - not sleeping as well with this. The migraines are better.   She's off celebrex. She's off nsaids b/c of skin bruises and bleeds.   She is doing better.     Her knees nerves were ablated 2 weeks ago - so she feels better from this as well.   Overall she is better.   She is on tramadol - requests I put it in -       10/7/2022  She is bruising really bad and bleeding badly.   She is totally off nsaids - b/c it started 6 months ago.   The brusiing is getting worse.   It's more bleeding now.   A couple of weeks aog she cut back on the savella 25mg a day.   She saw hematology at [place] and told to stop the savella.   She also saw pcp - - aPTT slightly elevated.     She's not on a blood thinner.     She went to [facility] in 8/2022 and dis labs at [place] .   The hepatologist was seen -   The dermatologist was seen - no biopsy was done  She is vit c deficient.     12/14/2022  Still getting the bruising. She did see dermatology - it's all leo her legs and arms -   She saw [place] and chaz - told it was thin skin - not felt it was from medications.   She had her allergy meds helds.   She went off the savella for 2 weeks and this did not decrease her bruising  She's back on the savella. She's on 25mg twice a day.     She has about 2-3/10 pain. With the change in the weather.     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   She is getting  another opinoin in [place] derm and planning to see arlene [place] as mir.     She's off water pill and still getting bruising.     1/9/2023  Her bruising on her skin is getting worse.   She was told by another dermatologist to stop savella for 2 months.   She has cont bleeding off the think skin after 2-3 weeks.   It's over her arms.     So she would like to stop the savella.   She is interested in treatments that would help while she tries to go off.     2/10/2023  She has a large wound after a broom hit it and ripped the skin off.   She went to ER at [facility] and they sent her to wound clinic  She is on the leflunomide 10mg a day and the pain is not better - it seems t work during the day and then it gets worse as the day goes on.   Her bruising is worse - but her majar bruise was 3 weeks ago - so no new major bruises in the last 3 weeks.   She has seen derm and couldn't not see [facility].     She has new brusies -like on her arms but non have bled out like the other ones.     Her pain is 2-3/10 pain. Since the savella iti's better overall - now she's on leflunoimde -  but it's more noticeable in the afternoon going up and down the stairs.   She fell on her right knee as well and it's sore with the fall 3 weeks ago.       4/24/2023  sh'es' moving better on leflunomide 10mg a day -   By mid afternoon she is worse again   But her rashes are worse - and skin bleeds are wors.e   She had to goot [facility] wound clinic for a skin bleed on her calf. She saw dr. estrada at [place] - once a week for 10 weeks and this helaed her right leg wound.   But now e has a left leg scan   She feels on lelufnomide she is bleeding more and bruising more.     She's offf savlla for 6 months.     6/14/2023   Bruising is much better - she is sitting in the sun. She saw derm at [facility] again on 6/1 /2023 -     Called pt. On 5/1/2023 -  And left message that pemphigus labs nomral - can consider siwthcing back to [facility] or  adding savella on to help with the pain   Both leflunomide and savella are helping her with the pain -     The bruising is better in the sun.   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 Told that it was no real cure for this.     She's on lelfunomide 10mg a day and savella 25mg a day.   She went for injection for her neckk in [place] 2 weeks ago - and it's not better yet - it was better for a couple of days - but her hands are still numb .   She was walking in the water when it was warm out.     She has leg and hands are bothering her - and it's raining so the pain is more -     9/18/2023    She's really tired.   She still has the bursiing.  She went down on the leflunomide 10mg every other day.   She's feeling better on the savella 25mg bid. She's better at work.   She is better after increasing the savella.      Still Works parti time - as  on the weekends.    11/6/2023  Needs renewal for savella.     She's doing alright.   She felt her bruising was getting worse and now she feels it's getting a little better.   It's two spots on the right and a few on the left arms.   She has 3-4/10 pain.  She had foot sx on Friday. She is got screws placed for right foot toe correction    1/22/2024  She quit the methotreate - not even a month. - B/c she felt too groggy.- too much daze -   She feels the weather is making her worse in the cold.   She's been back on lelfunomide 10mg a day for 1 yvette   Liver tests 1/16/2024 was normal.   She has about 4-5/10 -   The bruising is still better.   Leonor has long sleeves and long socks and not hitting things   Not working as much b/c slow season.     4/22/2024  She hasn't gotten savella since October and November -   Good rx is still over $300 a month.   Her pain is bad in the morning and late at night.   She feels if she goes to the bathroom then her joints loosen up.   Closer to the end of the day , it's hard to  get up.   She has low energy.   She has about 3-4/10 pain.     7/15/2024  The pain pain is ok - 3/10 pain - walked in the water today   Still bruising and bad fatigue   The fatigue is still bad -   She just got the savella approved - was on this - got her own script to tide her over   She is depressed about the bursiing     11/14/2024  Dragging and gloomy weather - not making her joints feel god.   Her hands , and joitns hurt - 3-4/10 pain - but her hands were ok during the summer   And her legs feel heavy again  Started taking leflunomide almost every day - and couldn't take it for 3 weeks with her foot sx - it was 2 weeks ago - - the surgery went ok #5   No pain in her right foot -     She took the lefunomdie 10mg daily - and noticed that she has more bruising.   Ibuprofen 800mg a day  as needed will be restarted when she goes back to work.       Wt Readings from Last 2 Encounters:   11/14/24 193 lb (87.5 kg)   09/30/24 187 lb (84.8 kg)     Body mass index is 36.47 kg/m².      Current Outpatient Medications   Medication Sig Dispense Refill    LEFLUNOMIDE 10 MG Oral Tab TAKE 1 TABLET BY MOUTH EVERY OTHER DAY 45 tablet 1    semaglutide 8 MG/3ML Subcutaneous Solution Pen-injector Inject 2 mg into the skin once a week.      FAMOTIDINE 20 MG Oral Tab TAKE 1 TABLET BY MOUTH EVERY DAY AT NIGHT 90 tablet 1    RIZATRIPTAN BENZOATE 10 MG Oral Tab TAKE 1 TABLET BY MOUTH EVERY DAY 9 tablet 11    amitriptyline 25 MG Oral Tab Take 1 tablet (25 mg total) by mouth nightly. 90 tablet 3    TIZANIDINE 2 MG Oral Tab TAKE 1 TABLET BY MOUTH EVERY 8 HOURS AS NEEDED. 270 tablet 1    pantoprazole 40 MG Oral Tab EC Take 1 tablet (40 mg total) by mouth 2 (two) times daily.      Meloxicam 15 MG Oral Tab Take 1 tablet (15 mg total) by mouth daily.      Milnacipran HCl 25 MG Oral Tab Take 25 mg by mouth in the morning and 25 mg before bedtime. 180 tablet 3    hydroxychloroquine 200 MG Oral Tab Take 1 tablet (200 mg total) by mouth 2 (two)  times daily. 180 tablet 1    Fremanezumab-vfrm (AJOVY) 225 MG/1.5ML Subcutaneous Solution Prefilled Syringe Inject 225 mg into the skin every 3 (three) months. 3 each 3    furosemide 20 MG Oral Tab Take 1 tablet (20 mg total) by mouth 2 (two) times daily.      fluticasone propionate 50 MCG/ACT Nasal Suspension 2 sprays by Nasal route daily.      metoprolol succinate ER 25 MG Oral Tablet 24 Hr Take 1 tablet (25 mg total) by mouth daily.      Potassium Chloride ER 20 MEQ Oral Tab CR Take 1 tablet by mouth daily with food.      Tretinoin 0.05 % External Cream Apply topically nightly.      traMADol 50 MG Oral Tab Take 1 tablet (50 mg total) by mouth daily as needed. 30 tablet 5    ibuprofen 800 MG Oral Tab       Vitamin D3, Cholecalciferol, 25 MCG (1000 UT) Oral Tab       rosuvastatin 10 MG Oral Tab Take 1 tablet (10 mg total) by mouth every evening.      Levocetirizine Dihydrochloride 5 MG Oral Tab Take 1 tablet (5 mg total) by mouth once as needed.      Montelukast Sodium 10 MG Oral Tab Take 1 tablet (10 mg total) by mouth daily.  2    PROAIR  (90 Base) MCG/ACT Inhalation Aero Soln USE 1-2 PUFFS EVERY 4-6 HOURS AS NEEDED FOR SHORTNESS OF BREATH. AND 15-30 MIN BEFORE STRENOUS ACTIV  2    Na Sulfate-K Sulfate-Mg Sulf 17.5-3.13-1.6 GM/177ML Oral Solution Take 1 tablet by mouth As Directed.      EPINEPHrine 0.3 MG/0.3ML Injection Solution Auto-injector Inject 0.3 mL (1 each total) into the muscle.        Past Medical History:    Allergic rhinitis    Arthritis    Asthma (HCC)    Fibromyalgia    High blood pressure    Hypercholesterolemia    Migraines    Obesity    PONV (postoperative nausea and vomiting)    Sleep apnea    CPAP PRESCRIBED - PT DOES NOT USE D/T CLAUSTROPHOBIA      Past Surgical History:   Procedure Laterality Date    Carpal tunnel release Bilateral     Colonoscopy  2017    At outside facility reported as normal per patient    Foot surgery Right 12/2021    Knee replacement surgery      Knee surgery  Right     Laps gstr rstcv px plmt band      Sinus surgery        Tubal ligation        Family History   Problem Relation Age of Onset    Cancer Mother         breast ca    Migraines Father    no fh of lupus.   1 brother and 1 sister -   2 cousins have MS.    Social History:  Social History     Socioeconomic History    Marital status: Single   Tobacco Use    Smoking status: Never    Smokeless tobacco: Never    Tobacco comments:     None   Vaping Use    Vaping status: Never Used   Substance and Sexual Activity    Alcohol use: Never    Drug use: Never   Other Topics Concern    Caffeine Concern Yes    Exercise No   Social History Narrative    The patient does not use an assistive device..      The patient does live in a home with stairs.     Social Drivers of Health     Financial Resource Strain: Low Risk  (7/10/2024)    Received from [facility]    Overall Financial Resource Strain (CARDIA)     Difficulty of Paying Living Expenses: Not very hard   Recent Concern: Financial Resource Strain - Medium Risk (4/17/2024)    Received from [facility]    Overall Financial Resource Strain (CARDIA)     Difficulty of Paying Living Expenses: Somewhat hard   Food Insecurity: No Food Insecurity (7/10/2024)    Received from [facility]    Hunger Vital Sign     Worried About Running Out of Food in the Last Year: Never true     Ran Out of Food in the Last Year: Never true   Transportation Needs: No Transportation Needs (7/10/2024)    Received from [facility]    PRAPARE - Transportation     Lack of Transportation (Medical): No     Lack of Transportation (Non-Medical): No   Physical Activity: Inactive (5/25/2023)    Received from [facility]    Exercise Vital Sign     Days of Exercise per Week: 0 days     Minutes of Exercise per Session: 0 min   Stress: No Stress Concern Present (5/25/2023)    Received from [facility]    [place]  [place] [employer] - Occupational Stress Questionnaire     Feeling of Stress : Not at all   Social Connections: Moderately Integrated (5/25/2023)    Received from [facility], [facility]    Social Connection and Isolation Panel [NHANES]     Frequency of Communication with Friends and Family: More than three times a week     Frequency of Social Gatherings with Friends and Family: Once a week     Attends Christian Services: More than 4 times per year     Active Member of Clubs or Organizations: Yes     Attends Club or Organization Meetings: More than 4 times per year     Marital Status:    Housing Stability: Low Risk  (7/10/2024)    Received from [facility]    Housing Stability Vital Sign     Unable to Pay for Housing in the Last Year: No     Number of Places Lived in the Last Year: 1     Unstable Housing in the Last Year: No      Single, 1 son - 26 years old,   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   And also in the past was working in high school at [employer]       REVIEW OF SYSTEMS:   Review Of Systems:  Fatigue  Constitutional:No fever, no change in weight or appetitie  Derm:  rashes, no oral ulcers, no alopecia, general hair loss,  no photosensitivity, no psoriasis  Gets bruises on her arms mostly - they bleeding easily - noticing over 1 year -   HEENT:  dry eyes, no dry mouth, no Raynaud's, no nasal ulcers, no parotid swelling, sometimes  neck pain, no jaw pain, no temple pain  Eyes: No visual changes,   CVS: No chest pain, no heart disease  RS: No SOB, no Cough, No Pleurtic pain,   GI: No nausea, no vomiiting, no abominal pain, no hx of ulcer, no gastritis, no heartburn, no dyshpagia, no BRBPR or melena  : no dysuria, gets lots of UTIs that she doenst' know she has - no hx of miscarriages, no DVT Hx, no hx of OCP,   Neuro: left arm  numbness or tingling,, her feet are numbwhen she wakes up,   Migraines - once to twice a month - lasts 1 week, depoprovera and takes maxalt and this helped get it under control - had migraines for a long time,  headache, no hx of seizures,   Psych: no hx of anxiety or depression  ENDO: no hx of thyroid disease, no hx of DM  Joint/Muscluskeltal: see HPI, no lower back pain,   All other ROS are negative.     EXAM:   Ht 5' 1\" (1.549 m)   Wt 193 lb (87.5 kg)   BMI 36.47 kg/m²   HEENT: Clear oropharynx, no oral ulcers, EOM intact, clear sclear, PERRLA, pleasant, no acute distress, no CAD, no neck tendnerness, good ROM,   CVS: RRR, no murmurs  RS: CTAB, no crackles, no rhonchi  ABD: Soft Non tender, no HSM felt, BS positive  Joint exam:   Tender points - in lower legs improved   Tender in 1-5th mcps and pips - no syvnoitis seen   No upper extremities tenderness.   No lower back tenderness  No neck tenderness   Squeeze test negative   Healed bruise over left  and right pretibial area -  Healed bruise over left forearm -   Scattered pinpoint lesions that were bleeding per pt - now healed.       Component      Latest Ref Rng & Units 2/6/2020   MYELOPEROX ANTIBODIES, IGG      0 - 19 AU/mL 0   SERINE PROTEASE3, IGG      0 - 19 AU/mL 5   B. BURGDORFERI, IGG WB      Negative Positive (A)   B. BURGDORFERI AB, IGM BY WB      Negative Negative   JENNY Titer/Pattern      <80 160 (A)   Reviewed By:       Sascha Medrano M.D.   Anti-Sjogren's A      Negative Negative   Anti-Sjogren's B      Negative Negative   Anti-Smith Antibody      Negative Negative   Anti-Wheeler/RNP Antibody      Negative Negative   Cardiolipin Antibody, IgA      0 - 11 APL 0   Cardiolipin IgG Antibody      0.0 - 14.9 GPL 4.7   Cardiolipin IgM Antibody      0.0 - 12.4 MPL 15.7 (H)   RHEUMATOID FACTOR      <15 IU/mL <10   JENNY SCREEN WITH REFLEX (S)      Negative Positive (A)   Lyme Screen IgG and IgM      Negative Equivocal   Gliadin Deamidated IgG Ab      <7.0 U/mL 0.5   Tissue Transglutaminase IgA Ab      <7.0 U/mL 0.2    ENDOMYSIAL ANTIBODY, IGA BY IF      <1:10 <1:10   Gliadin Deamidated IgA Ab      <7.0 U/mL 18.0 (H)   IMMUNOGLOBULIN A      70.00 - 312.00 mg/dL 132.00   Anti Double Strand DNA      <10 <10       Component      Latest Ref Rng & Units 1/17/2022   B. BURGDORFERI, IGG WB      Negative Positive (A)   B. BURGDORFERI AB, IGM BY WB      Negative Negative   ALDOLASE, SERUM      1.5 - 8.1 U/L 5.3   CK      26 - 192 U/L 67   C-REACTIVE PROTEIN      <0.30 mg/dL 1.14 (H)   SED RATE      0 - 30 mm/Hr 24   Lyme Screen IgG and IgM      Negative Positive (A)               Component      Latest Ref Rng & Units 1/17/2022   B. BURGDORFERI, IGG WB      Negative Positive (A)   B. BURGDORFERI AB, IGM BY WB      Negative Negative   ALDOLASE, SERUM      1.5 - 8.1 U/L 5.3   CK      26 - 192 U/L 67   C-REACTIVE PROTEIN      <0.30 mg/dL 1.14 (H)   SED RATE      0 - 30 mm/Hr 24   Lyme Screen IgG and IgM      Negative Positive (A)                         Component      Latest Ref Rng 2/10/2023   C-REACTIVE PROTEIN      <0.30 mg/dL 0.71 (H)    SED RATE      0 - 30 mm/Hr 50 (H)       Component      Latest Ref Rng 4/24/2023   Result Comment    Desmoglein (Dsg) 1      U/ml 10.1    Desmoglein (Dsg) 3      U/ml 7.2    Clinical Relevance Notes    Electronically Signed By Comment    SED RATE      0 - 30 mm/Hr 10    C-REACTIVE PROTEIN      <0.30 mg/dL <0.29          7/10/24 10:01 AM    WBC  3.5 - 10.5 K/UL 7.9   RBC  3.80 - 5.20 M/UL 4.65   HGB  11.5 - 15.5 GM/DL 13.4   HCT  34.0 - 46.5 % 42.4   MCV  82.0 - 99.0 FL 91.2   MCH  27.0 - 34.0 PG 28.8   MCHC  32.0 - 36.0 GM/DL 31.6 Low    RDW  11.0 - 15.0 % 14.7   PLT CNT  150 - 400 K/     Component      Latest Ref Rng 7/15/2024 7/30/2024   Glucose      65 - 99 mg/dL  75    BUN      7 - 25 mg/dL  9    CREATININE      0.50 - 1.05 mg/dL  0.84    EGFR      > OR = 60 mL/min/1.73m2  77    BUN/CREATININE RATIO      6 - 22 (calc)  SEE NOTE:    Sodium      135 - 146 mmol/L  139    Potassium      3.5 - 5.3  mmol/L  3.5    Chloride      98 - 110 mmol/L  105    Carbon Dioxide, Total      20 - 32 mmol/L  26    CALCIUM      8.6 - 10.4 mg/dL  9.4    PROTEIN, TOTAL      6.1 - 8.1 g/dL 7.2  6.7    Albumin      3.6 - 5.1 g/dL 4.5  4.0    Globulin      1.9 - 3.7 g/dL (calc)  2.7    A/G Ratio      1.0 - 2.5 (calc)  1.5    Total Bilirubin      0.2 - 1.2 mg/dL 0.5  0.6    Alkaline Phosphatase      37 - 153 U/L  70    AST (SGOT)      10 - 35 U/L 17  14    ALT (SGPT)      6 - 29 U/L 9 (L)  9    ALKALINE PHOSPHATASE      50 - 130 U/L 76     Bilirubin, Direct      <=0.3 mg/dL 0.2     SED RATE      0 - 30 mm/Hr 22     C-REACTIVE PROTEIN      <1.00 mg/dL <0.40        Legend:  (L) Low      ASSESSMENT AND PLAN:   Sarahi Jones is a 64 year old female who presents for   Chief Complaint   Patient presents with    Follow - Up       1. Fibromyalgia/osteoarthritis - joitn pain jayce in legs , elevated sed rateand crp   - possibly seronegative RA - overalap  Doing better on savella - back on this 25mg twice a day  doing better    tried  going off for 2 months with no improvement of her skin bleeding .   On savella she is at least - this is working for her - she feels 40-50% better -   - stopped around 1/9/2023 - - restaretd in 5/2023 - having trouble now with the shipmenets   Cont. savella 25mg bid   - cont.amitriptylien 20mg at night   tramdaol 50mg prn   - cont.Lefunomide 10mg every other day - tapered due to elevated LFTS - now normal  - but now elevated sed rate again and more stiffess  Increased leflunomide 10mg a day - and this caused more bruising   But she has more inflammation now - and stffness is worse.   Tried methotrexate 10mg a week and fa 1mg a day  - was  too groggy -   Tried - hydroxychlroquine 200mg twice a day - smita not help her -stopped  after 2 months.   Tramadol usually given by pcp - I will refill and take over - since she states she hasn't seen her pcp in a long time -     In the past:   She has had no luck with any  other medications helping her.  Was on savella in the past - now not able to take b/c of her insurance coverage, can't afford it - b/c it's on a different tier, se tried pt. assitance with dr. García,   Tried hcq for 1month - and then stopped it - b/c savella is working for her.   In the past hcq never helped her.   No improvement on  LDN 4.5mg a day    No better on dulxoetine 30mg a day - not better with  30mg twice a day -   In the past treid gabapentin- weight gain, lyrica didn't help,         Would try since all other options have failed and this has given some succes to patients.   Can't get good rx or assiteance for savella   Takes tizandine - helps with arm pain - doesn't help with leg pains    2. brusiing over legs - getting better.   with bleeding into the skin with hx of lupus anticoagulation -   Garnder-miladys syndrome -   Consider if her rashes are related to her lymes   Follow up dr. Garcia at [facility] -     brusiing of skin has been going on for months - longer than starting the savella, savella has causes some brusiing for her in the past   The bleeding into the skin is toally new in the last few months.   Tried stopping savella for 2 weeks but the brusinign continued. So she is back on savella - but still has bruises   - off  savella for at least 2 months = stopped 1/9/2023 - went off  off for 3 months   Started on leflunomide - and feels bruising and bleeding is worsening but overall it's been stabe on this     -not  pemphigous - ? Foliaceus type desmoglein 1 and 3negative    [place] [facility] [place] and hematology last week at [facility] (unable to review records)   Saw [facility] again in 10/2022   Saw Dr. Mena at [place] in 11/2022 -   Seen dermatology a few times.     She's not on warfarin that causes skin necrosis or nsaids that cause pseudoreaction in the sun   Lupus anticoagulant usually not assoactiaed with this kind of brusiing   It's not a typical look for  vasculitis eitehr   She doesn't decribe bullous lesions seen with pemphigoid or TEN either -  y  Doesn't appear like pyoderma gangrenosum   Appears like nsaids related pseudoreactoin but she is off all nadis.         3. undifferentiated connective tissue disease -  - Positive JENNY 1:320 and lupus anticoagulant - no raynaud's, easy bruising in arms , hx of migraines   Elevated esr and crp-  Slightly elevated esr and crp -   Work up for lupus has been negative per rheumatology at [place]   Hx of hcq - did not help her -   -d/w her to try methotrexate - she declined - b/c of hair loss possiblity   - stopped  leflunomide every 10mg every other  day - started in 1/2023- doing better on this til 11/2023  Now back on it since 12/2023   Asked her to Taper again to every other day due to hx of elevated lfts - -   Off methotrexaye 10mg a  weel and fa 1mg a day- too groggy with this.   Add back hcq 200mg bid - she tried it for 1 month in the past - d/w her to give it at least 2 month s  Discussed risks and benefits of medication with patient , including retinal toxicity risk and importance of regular monitoring on the medication.     Rtc in 3-4  months   ;abs in 3-4 months     She feels bruising is worse on this - it is also helping the pain   - cont. For now - will see if bruising improves off savella for a longer time and see if leflunomide helps with the pain and inflammation    Hematology follow b/c of bruising - no clear etiology - not releated to lupus anticoaglunat at this time.     4. Right knee osteoarthritis - s/p right knee arthroplasty - in 10/2018  Still hurts   Takes tramadol 50mg a day   Tried diclofenac and celebrex in the past.   meloxicam didn't help.   Diclofenac helped a little bit. VOLTAREN GEL 1 % topical helped a litlte bit.   Had sx at [place] - will be disucssing with Dr. King to see if she needs another sx.       5. Left shoulder pain - better with steroid injection in 9/2021 - assc numbness and tingling.       6. Lyme disease - in 2/2020 - pos. Burgdorferi. Saw ID - treated with abx 1 month of doxycycline. She felt better after the oral abx but then felt bad again.   She was not able to return to ID b/c of Covid lockdown happened in 3/2020. Never got the disucssion if she needed IV abx.   Has numbness in legs til she gets motivated.   She had EMG   She feels her feet and legs started around the time she was dx with lymes.   [facility] on 2/10/2022 - they did not recommend further treatment = -  Has another follow up -   [facility]  - not keen on going on iv abx     7. Neuropathy /headaches - on amitriptyline 10mg at night - increase to 20mg a day - since 25mg makes her too drowsy     8. Hx of candida esophagitis - in 12/2022 - egd - treatedw ith anti fungal      Summary:  1.  Cont.  leflunomide 10mg every other day   2.Cont.  Savella 25mg twice a day in the future  - Pt. Assistance form for savella filled out in 4/2024 -   3.  Cont. amitrpityline 20mg at night  4. . Cont. Tramadol 50mg a day as needed.   5.. Cont. tizandine for arm pain as needed  6.  Return to clinic in 4 month  7.   Check labs in in 4 months   8. Ibuprofen as needed. .     [email or web address]    -consider CBD oil    Shara Michelle MD  11/14/2024   3:16 PM         is applicable because the patient's medical record notes reflects the ongoing nature of the continuous longitudinal relationship of care, and the medical record indicates that there is ongoing treatment of a serious/complex medical condition.

## 2024-12-23 NOTE — TELEPHONE ENCOUNTER
Dr. Taylor Ramirez--    I scheduled this patient, when I was asking about her medication she stated that she has a lap band and wanted to know if she should have it emptied before the procedure.  Please advise Alert-The patient is alert, awake and responds to voice. The patient is oriented to time, place, and person. The triage nurse is able to obtain subjective information.

## 2024-12-31 DIAGNOSIS — G43.009 MIGRAINE WITHOUT AURA AND WITHOUT STATUS MIGRAINOSUS, NOT INTRACTABLE: ICD-10-CM

## 2024-12-31 RX ORDER — AMITRIPTYLINE HYDROCHLORIDE 10 MG/1
20 TABLET ORAL NIGHTLY
Qty: 180 TABLET | Refills: 1 | Status: SHIPPED | OUTPATIENT
Start: 2024-12-31

## 2024-12-31 NOTE — TELEPHONE ENCOUNTER
Requested Prescriptions     Pending Prescriptions Disp Refills    AMITRIPTYLINE 10 MG Oral Tab [Pharmacy Med Name: AMITRIPTYLINE HCL 10 MG TAB] 180 tablet 1     Sig: TAKE 2 TABLETS BY MOUTH NIGHTLY     LOV: 11/14/24  Future Appointments   Date Time Provider Department Center   3/5/2025 11:40 AM Shara Michelle MD [facility] [place]     Labs:   Component      Latest Ref Rng 11/14/2024   WBC      4.0 - 11.0 x10(3) uL 7.5    RBC      3.80 - 5.30 x10(6)uL 4.19    Hemoglobin      12.0 - 16.0 g/dL 12.3    Hematocrit      35.0 - 48.0 % 37.9    MCV      80.0 - 100.0 fL 90.5    MCH      26.0 - 34.0 pg 29.4    MCHC      31.0 - 37.0 g/dL 32.5    RDW      11.0 - 15.0 % 14.6    RDW-SD      35.1 - 46.3 fL 48.1 (H)    Platelet Count      150.0 - 450.0 10(3)uL 259.0    CREATININE      0.55 - 1.02 mg/dL 0.91    EGFR      >=60 mL/min/1.73m2 70    ALT (SGPT)      10 - 49 U/L 20    SED RATE      0 - 30 mm/Hr 17    C-REACTIVE PROTEIN      <1.00 mg/dL <0.40    AST (SGOT)      <34 U/L 22       Legend:  (H) High         Summary:  1.  Cont.  leflunomide 10mg every other day   2.Cont.  Savella 25mg twice a day in the future  - Pt. Assistance form for savella filled out in 4/2024 -   3.  Cont. amitrpityline 20mg at night  4. . Cont. Tramadol 50mg a day as needed.   5.. Cont. tizandine for arm pain as needed  6.  Return to clinic in 4 month  7.   Check labs in in 4 months   8. Ibuprofen as needed. .      [email or web address]     -consider CBD oil     Shara Michelle MD  11/14/2024   3:16 PM

## 2025-01-09 ENCOUNTER — TELEPHONE (OUTPATIENT)
Dept: RHEUMATOLOGY | Facility: CLINIC | Age: 65
End: 2025-01-09

## 2025-01-09 NOTE — TELEPHONE ENCOUNTER
Called patient back. This is for medication Savella. On PAP and they require a letter from our office to send medication to patients home instead of our office. She provided the case #, phone, and fax.     Letter generated. Please review and sign.     Fax: [phone]  Phone: [phone]    Case ID: [id number]

## 2025-01-09 NOTE — TELEPHONE ENCOUNTER
Patient asking for call back from Dr. Michelle or nurse regarding medication,stated needs letter to have  ship to house rather than office.

## 2025-02-25 NOTE — TELEPHONE ENCOUNTER
Katherine   [phone] standard #    Verbal script call-in for [facility]  [phone]; it's a voicemail, press option 1; leave Dr's npi # and state license # as well.    Harley; gets this medication free; Marilin needs a corrected script to say how many tablets  patient should take a day and times a day.  Be aware:  The last script was canceled because it was not clear.

## 2025-02-28 NOTE — TELEPHONE ENCOUNTER
Marilin asking for corrected script for Aric. Please advise.      Summary:  1.  Cont.  leflunomide 10mg every other day   2.Cont.  Savella 25mg twice a day in the future  - Pt. Assistance form for savella filled out in 4/2024 -   3.  Cont. amitrpityline 20mg at night  4. . Cont. Tramadol 50mg a day as needed.   5.. Cont. tizandine for arm pain as needed  6.  Return to clinic in 4 month  7.   Check labs in in 4 months   8. Ibuprofen as needed. .      [email or web address]     -consider CBD oil     Shara Michelle MD  11/14/2024   3:16 PM

## 2025-03-01 RX ORDER — MILNACIPRAN HYDROCHLORIDE 25 MG/1
25 TABLET, FILM COATED ORAL 2 TIMES DAILY
Qty: 180 TABLET | Refills: 3 | Status: SHIPPED | OUTPATIENT
Start: 2025-03-01 | End: 2025-05-30

## 2025-03-03 ENCOUNTER — LAB ENCOUNTER (OUTPATIENT)
Dept: LAB | Facility: HOSPITAL | Age: 65
End: 2025-03-03
Attending: INTERNAL MEDICINE
Payer: MEDICARE

## 2025-03-03 DIAGNOSIS — Z51.81 THERAPEUTIC DRUG MONITORING: ICD-10-CM

## 2025-03-03 DIAGNOSIS — M06.4 UNDIFFERENTIATED INFLAMMATORY ARTHRITIS (HCC): ICD-10-CM

## 2025-03-03 LAB
ALT SERPL-CCNC: 9 U/L
AST SERPL-CCNC: 17 U/L (ref ?–34)
CREAT BLD-MCNC: 0.9 MG/DL
CRP SERPL-MCNC: <0.4 MG/DL (ref ?–1)
DEPRECATED RDW RBC AUTO: 49.1 FL (ref 35.1–46.3)
EGFRCR SERPLBLD CKD-EPI 2021: 71 ML/MIN/1.73M2 (ref 60–?)
ERYTHROCYTE [DISTWIDTH] IN BLOOD BY AUTOMATED COUNT: 14.6 % (ref 11–15)
ERYTHROCYTE [SEDIMENTATION RATE] IN BLOOD: 47 MM/HR
HCT VFR BLD AUTO: 41.3 %
HGB BLD-MCNC: 13.2 G/DL
MCH RBC QN AUTO: 28.9 PG (ref 26–34)
MCHC RBC AUTO-ENTMCNC: 32 G/DL (ref 31–37)
MCV RBC AUTO: 90.4 FL
PLATELET # BLD AUTO: 212 10(3)UL (ref 150–450)
RBC # BLD AUTO: 4.57 X10(6)UL
WBC # BLD AUTO: 8.4 X10(3) UL (ref 4–11)

## 2025-03-03 PROCEDURE — 86140 C-REACTIVE PROTEIN: CPT

## 2025-03-03 PROCEDURE — 85652 RBC SED RATE AUTOMATED: CPT

## 2025-03-03 PROCEDURE — 82565 ASSAY OF CREATININE: CPT

## 2025-03-03 PROCEDURE — 84450 TRANSFERASE (AST) (SGOT): CPT

## 2025-03-03 PROCEDURE — 36415 COLL VENOUS BLD VENIPUNCTURE: CPT

## 2025-03-03 PROCEDURE — 85027 COMPLETE CBC AUTOMATED: CPT

## 2025-03-03 PROCEDURE — 84460 ALANINE AMINO (ALT) (SGPT): CPT

## 2025-03-05 ENCOUNTER — OFFICE VISIT (OUTPATIENT)
Dept: RHEUMATOLOGY | Facility: CLINIC | Age: 65
End: 2025-03-05
Payer: MEDICARE

## 2025-03-05 ENCOUNTER — TELEPHONE (OUTPATIENT)
Dept: RHEUMATOLOGY | Facility: CLINIC | Age: 65
End: 2025-03-05

## 2025-03-05 ENCOUNTER — OFFICE VISIT (OUTPATIENT)
Dept: NEUROLOGY | Facility: CLINIC | Age: 65
End: 2025-03-05
Payer: MEDICARE

## 2025-03-05 VITALS
BODY MASS INDEX: 36.89 KG/M2 | SYSTOLIC BLOOD PRESSURE: 120 MMHG | WEIGHT: 195.38 LBS | HEIGHT: 61 IN | RESPIRATION RATE: 16 BRPM | HEART RATE: 97 BPM | DIASTOLIC BLOOD PRESSURE: 75 MMHG

## 2025-03-05 DIAGNOSIS — M54.12 CERVICAL RADICULOPATHY: ICD-10-CM

## 2025-03-05 DIAGNOSIS — M79.7 FIBROMYALGIA: Primary | ICD-10-CM

## 2025-03-05 DIAGNOSIS — G43.009 MIGRAINE WITHOUT AURA AND WITHOUT STATUS MIGRAINOSUS, NOT INTRACTABLE: Primary | ICD-10-CM

## 2025-03-05 DIAGNOSIS — G47.00 INSOMNIA, UNSPECIFIED TYPE: ICD-10-CM

## 2025-03-05 DIAGNOSIS — M06.4 UNDIFFERENTIATED INFLAMMATORY ARTHRITIS (HCC): ICD-10-CM

## 2025-03-05 DIAGNOSIS — G43.009 MIGRAINE WITHOUT AURA AND WITHOUT STATUS MIGRAINOSUS, NOT INTRACTABLE: ICD-10-CM

## 2025-03-05 DIAGNOSIS — M19.90 UNDIFFERENTIATED INFLAMMATORY ARTHRITIS: Primary | ICD-10-CM

## 2025-03-05 DIAGNOSIS — Z51.81 THERAPEUTIC DRUG MONITORING: ICD-10-CM

## 2025-03-05 PROCEDURE — 99214 OFFICE O/P EST MOD 30 MIN: CPT | Performed by: INTERNAL MEDICINE

## 2025-03-05 PROCEDURE — 99214 OFFICE O/P EST MOD 30 MIN: CPT | Performed by: OTHER

## 2025-03-05 PROCEDURE — G2211 COMPLEX E/M VISIT ADD ON: HCPCS | Performed by: INTERNAL MEDICINE

## 2025-03-05 PROCEDURE — G2211 COMPLEX E/M VISIT ADD ON: HCPCS | Performed by: OTHER

## 2025-03-05 RX ORDER — MILNACIPRAN HYDROCHLORIDE 25 MG/1
TABLET, FILM COATED ORAL
Qty: 180 TABLET | Refills: 0 | Status: SHIPPED | OUTPATIENT
Start: 2025-03-05 | End: 2025-06-09

## 2025-03-05 RX ORDER — IBUPROFEN 800 MG/1
800 TABLET, FILM COATED ORAL EVERY 12 HOURS PRN
Qty: 180 TABLET | Refills: 0 | Status: SHIPPED | OUTPATIENT
Start: 2025-03-05

## 2025-03-05 RX ORDER — ATOGEPANT 30 MG/1
30 TABLET ORAL DAILY
Qty: 30 TABLET | Refills: 0 | Status: SHIPPED | OUTPATIENT
Start: 2025-03-05

## 2025-03-05 RX ORDER — AMITRIPTYLINE HYDROCHLORIDE 50 MG/1
50 TABLET ORAL NIGHTLY
Qty: 90 TABLET | Refills: 3 | Status: SHIPPED | OUTPATIENT
Start: 2025-03-05

## 2025-03-05 RX ORDER — TIZANIDINE 2 MG/1
2 TABLET ORAL EVERY 8 HOURS PRN
Qty: 270 TABLET | Refills: 1 | Status: SHIPPED | OUTPATIENT
Start: 2025-03-05

## 2025-03-05 RX ORDER — LEFLUNOMIDE 10 MG/1
10 TABLET ORAL EVERY OTHER DAY
Qty: 45 TABLET | Refills: 1 | Status: SHIPPED | OUTPATIENT
Start: 2025-03-05

## 2025-03-05 RX ORDER — TRAMADOL HYDROCHLORIDE 50 MG/1
50 TABLET ORAL
Qty: 30 TABLET | Refills: 5 | Status: SHIPPED | OUTPATIENT
Start: 2025-03-05

## 2025-03-05 NOTE — PROGRESS NOTES
Neurology follow-up visit     Referred By: Dr. Gutiérrez ref. provider found    Chief Complaint:   Chief Complaint   Patient presents with    Migraine     Patient presents here today for follow-up, patient reports having migraines 8-10 per month with light sensitivity with dizziness, nausea. Current medications amitriptyline · Rizatriptan Benzoate.        HPI:     Sarahi Jones is a 65 year old female, who presents for trouble with walking, pain, headaches.  Apparently patient has had for a number of years problems with her waking up in the morning and feeling very stiff in her feet and ankles, it takes 2 hours for her to start moving and feeling better.  But also if she spends a lot of time on her feet it hurts especially in her right knee.  She has been seen by orthopedic specialist, apparently no obvious problem with the knee itself was found.  She also finds herself limping all the time on the right side.  No bladder control issues.  Also some numbness in her feet but more so pain in her feet.  She had an EMG of her right lower extremities done, slightly decreased amplitude of the superficial peroneal nerve and may be his mom decreased recruitment in the iliopsoas muscle and therefore femoral and superficial peroneal neuropathy were considered but they were quite mild.      Also patient had history of migraines.  She is been seen by multiple neurologists in the past, she remembers being on Topamax, she does not go back on it.  She was on Lyrica and gabapentin in the past for pain in her feet.  She was also prescribed Seroquel by her rheumatologist apparently, was unclear the reason.  She did have MRI of the lumbar spine done that showed some degenerative changes but relatively mild in nature.  She was taking tramadol, diclofenac and some other pain medications on a regular basis.    For migraines she was most recently prescribed Ajovy her gynecologist, and with that and Maxalt she was able to manage her migraines  much better, she was getting them maybe twice a month and able to abort them very quickly with Maxalt.    - MRI BRAIN (CPT=70551); Future  - MRI SPINE CERVICAL (CPT=72141); Future  - MRI SPINE THORACIC (CPT=72146); Future  - JENNY,DIRECT,REFLEX TITER + SPECIFIC ANTIBODIES; Future  - ANTICARDIOLIPIN AB, IGA, QN  - ANTICARDIOLIPIN AB, IGG, QN  - ANTICARDIOLIPIN AB, IGM, QN  - CBC WITH DIFFERENTIAL WITH PLATELET  - IMMUNOFIXATION [ MARIANNA]; Future  - HOMOCYSTEINE; Future  - MPO/PA-3 ANCA ANTIBODIES; Future  - NEUTROPHIL ASSOCIATED AB; Future  - PARANEOPLASTIC AUTOAB EVAL; Future  - RHEUMATOID ARTHRITIS FACTOR  - VITAMIN B12  - CELIAC DISEASE AB EXPANDED PNL; Future  - LYME DISEASE, TOTAL AB W RFLX; Future    Testing was done, she was positive for both Lyme disease and some antibodies for celiac disease were positive from the panel.  Patient followed up with GI doctor, she is scheduled for EGD.  It seems that most likely she will require a gluten-free diet.    She also follow-up with infectious disease doctor and apparently was confirmed that she did have Lyme disease and that she needs to be on antibiotics.    In the meantime gabapentin was somewhat helpful for her pain in her feet and legs and no side effects.  Ajovy seems to have been helpful to decrease frequency of migraines to about 2 or 3 times a month, and was able to abort them easily with Maxalt     Follow-up in June 2020 she was reporting new development of few weeks pain shooting down from her left shoulder into her left upper arm.  Still having symptoms in her legs when she wakes up especially she was at that point on 300 milligrams of gabapentin 3 times a day.  At that point we increased the dose of gabapentin.  Patient also was referred to physiatry and continued with physical therapy.  Patient continued with Ajovy.    Patient came back for follow-up in June 2021.  She is still dealing with headaches.  That time she was describing them coming in cluster for  about a week of headache and 3 weeks of no headaches.  And there was 5 days or 7 days of headache she would use the Maxalt up to twice a day.  She also was complaining of worsening of quality of sleep that might have been contributing to her increased headaches.  In the meantime she stopped gabapentin since it was unhelpful and causing some weight gain.    Amitriptyline was added to Maxalt and Ajovy.  Patient came back for follow-up in January 2022.    Point patient also was seen at [facility], diagnosed with fibro myalgia, was placed on Savella.  That was somewhat helpful.  However with her new insurance, Medicare, it was not covering neither Savella nor Ajovy.    Patient continues with that treatment and came back for follow-up in October 2023.  Headaches were still relatively well controlled, but her sleep was getting worse.  She was interested in trying to go up on the dose of amitriptyline to 25 mg.  Also patient was reporting and some years ago should had cervical injections for her cervical radiculopathy at [facility] but wanted to establish with somebody locally to consider continuing with treatment    Patient came back for follow-up in July 2024, doing relatively well, still headaches were relatively well-controlled.  She was tolerating amitriptyline and Savella at the same time.  She was educated about possibility of serotonin syndrome.  Symptoms were discussed and the immediate medical attention was advised if any of those symptoms occur.    Patient came back for follow up in March 2025.  The patient reports an increase in headaches, with migraines occurring 6 to 8 times per month. They also experience other headaches, primarily in the morning. The patient's current amitriptyline dose of 25 mg is no longer as effective in managing their symptoms. They report feeling constantly tired and experiencing grogginess in the morning.    The patient is currently taking Ajovy and Savella for migraine management,  and uses rizatriptan to abort headaches when they occur. Despite these medications, the patient's sleep quality remains poor, and they mention that amitriptyline no longer helps them fall asleep.    The patient expresses concern about potential brain tumors due to long-term use of Depo-Provera for 20 years. They report having had a brain scan 5 years ago. The patient denies any weakness or numbness beyond what they consider normal for their arthritis.    When asked about their sleep, the patient reports poor sleep quality and mentions using a new mouth guard. They also report snoring at night.    The patient's medical history includes migraines, arthritis, and sleep issues (possibly sleep apnea, but unconfirmed).    Past Medical History:    Allergic rhinitis    Arthritis    Asthma (HCC)    Fibromyalgia    High blood pressure    Hypercholesterolemia    Migraines    Obesity    PONV (postoperative nausea and vomiting)    Sleep apnea    CPAP PRESCRIBED - PT DOES NOT USE D/T CLAUSTROPHOBIA       Past Surgical History:   Procedure Laterality Date    Carpal tunnel release Bilateral     Colonoscopy  2017    At outside facility reported as normal per patient    Foot surgery Right 12/2021    Knee replacement surgery      Knee surgery Right     Laps gstr rstcv px plmt band      Sinus surgery        Tubal ligation         Social history:  History   Smoking Status    Never   Smokeless Tobacco    Never       History   Alcohol Use Never       History   Drug Use Unknown         Family History   Problem Relation Age of Onset    Cancer Mother         breast ca    Migraines Father          Current Outpatient Medications:     leflunomide 10 MG Oral Tab, Take 1 tablet (10 mg total) by mouth every other day., Disp: 45 tablet, Rfl: 1    ibuprofen 800 MG Oral Tab, Take 1 tablet (800 mg total) by mouth every 12 (twelve) hours as needed for Pain., Disp: 180 tablet, Rfl: 0    traMADol 50 MG Oral Tab, Take 1 tablet (50 mg total) by mouth daily  as needed., Disp: 30 tablet, Rfl: 5    tiZANidine 2 MG Oral Tab, Take 1 tablet (2 mg total) by mouth every 8 (eight) hours as needed., Disp: 270 tablet, Rfl: 1    Milnacipran HCl (SAVELLA) 25 MG Oral Tab, Take 25 mg by mouth in the morning and 25 mg before bedtime., Disp: 180 tablet, Rfl: 3    semaglutide 8 MG/3ML Subcutaneous Solution Pen-injector, Inject 2 mg into the skin once a week., Disp: 3 mL, Rfl: 0    FAMOTIDINE 20 MG Oral Tab, TAKE 1 TABLET BY MOUTH EVERY DAY AT NIGHT, Disp: 90 tablet, Rfl: 1    RIZATRIPTAN BENZOATE 10 MG Oral Tab, TAKE 1 TABLET BY MOUTH EVERY DAY, Disp: 9 tablet, Rfl: 11    Na Sulfate-K Sulfate-Mg Sulf 17.5-3.13-1.6 GM/177ML Oral Solution, Take 1 tablet by mouth As Directed., Disp: , Rfl:     amitriptyline 25 MG Oral Tab, Take 1 tablet (25 mg total) by mouth nightly., Disp: 90 tablet, Rfl: 3    pantoprazole 40 MG Oral Tab EC, Take 1 tablet (40 mg total) by mouth 2 (two) times daily., Disp: , Rfl:     hydroxychloroquine 200 MG Oral Tab, Take 1 tablet (200 mg total) by mouth 2 (two) times daily., Disp: 180 tablet, Rfl: 1    Fremanezumab-vfrm (AJOVY) 225 MG/1.5ML Subcutaneous Solution Prefilled Syringe, Inject 225 mg into the skin every 3 (three) months., Disp: 3 each, Rfl: 3    furosemide 20 MG Oral Tab, Take 1 tablet (20 mg total) by mouth 2 (two) times daily., Disp: , Rfl:     fluticasone propionate 50 MCG/ACT Nasal Suspension, 2 sprays by Nasal route daily., Disp: , Rfl:     metoprolol succinate ER 25 MG Oral Tablet 24 Hr, Take 1 tablet (25 mg total) by mouth daily., Disp: , Rfl:     Potassium Chloride ER 20 MEQ Oral Tab CR, Take 1 tablet by mouth daily with food., Disp: , Rfl:     Tretinoin 0.05 % External Cream, Apply topically nightly., Disp: , Rfl:     Vitamin D3, Cholecalciferol, 25 MCG (1000 UT) Oral Tab, , Disp: , Rfl:     rosuvastatin 10 MG Oral Tab, Take 1 tablet (10 mg total) by mouth every evening., Disp: , Rfl:     Levocetirizine Dihydrochloride 5 MG Oral Tab, Take 1 tablet  (5 mg total) by mouth once as needed., Disp: , Rfl:     Montelukast Sodium 10 MG Oral Tab, Take 1 tablet (10 mg total) by mouth daily., Disp: , Rfl: 2    PROAIR  (90 Base) MCG/ACT Inhalation Aero Soln, USE 1-2 PUFFS EVERY 4-6 HOURS AS NEEDED FOR SHORTNESS OF BREATH. AND 15-30 MIN BEFORE STRENOUS ACTIV, Disp: , Rfl: 2    EPINEPHrine 0.3 MG/0.3ML Injection Solution Auto-injector, Inject 0.3 mL (1 each total) into the muscle., Disp: , Rfl:     Allergies   Allergen Reactions    Bee Venom ANAPHYLAXIS, HIVES, RASH, SHORTNESS OF BREATH, Tightness in Chest and WHEEZING    Egg NAUSEA AND VOMITING, SHORTNESS OF BREATH and WHEEZING    Iodine (Topical) PALPITATIONS    Shrimp ANAPHYLAXIS and TONGUE SWELLING    Cefuroxime RASH       ROS:   As in HPI, the rest of the 14 system review was done and was negative      Physical Exam:  There were no vitals filed for this visit.    General: No apparent distress, well nourished, well groomed.  Head- Normocephalic, atraumatic  Eyes- No redness or swelling  ENT- Hearing intake, normal glutition  Neck- No masses or adenopathy    Neurological:     Mental Status- Alert and oriented x3.  Normal attention span and concentration  Thought process intact  Memory intact- recent and remote  Mood intact  Fund of knowledge appropriate for education and age    Language intact including: comprehension, naming, repetition, vocabulary    Cranial Nerves:    VII. Face symmetric, no facial weakness  VIII. Hearing intact to whisper.  IX. Pallet elevates symmetrically.  XI. Shoulder shrug is intact  XII. Tongue is midline    Motor Exam:  Muscle tone normal  No atrophy or fasciculations  Strength- upper extremities 5/5 proximally and distally  Rapid alternating movements intact    Gait:  Limping right side gait, possibly antalgic    Labs:    No results found for: \"TSH\"  No results found for: \"CHOL\", \"HDL\", \"LDL\", \"TRIG\"  Lab Results   Component Value Date    HGB 13.2 03/03/2025    HCT 41.3 03/03/2025     MCV 90.4 03/03/2025    WBC 8.4 03/03/2025    .0 03/03/2025      Lab Results   Component Value Date    BUN 9 07/30/2024    CA 9.4 07/30/2024    ALT 9 (L) 03/03/2025    AST 17 03/03/2025    ALB 4.0 07/30/2024     07/30/2024    K 3.5 07/30/2024     07/30/2024    CO2 26 07/30/2024      I have reviewed labs.    Imaging Studies:  I have independently reviewed imaging.  I have reviewed the EMG tracings independently, as above    MRI of the brain, cervical and thoracic spines were reviewed, some mild degenerative changes and nonspecific white matter changes noted but no obvious explanation for her symptoms.    Assessment   1.  Paresthesia  Continue with amitriptyline.     2. Cervical radiculopathy     3. Migraine without aura and without status migrainosus, not intractable    Chronic Migraine:  - Increase amitriptyline to 50mg nightly  - Initiate Qulipta, pending insurance approval  - Continue Ajovy and Savella  - Maintain rizatriptan for acute migraine management  - Follow up in a few months to reassess treatment efficacy  - Patient reports increased frequency of migraines, occurring 6-8 times per month, with additional morning headaches  - Current treatment regimen includes Ajovy, Savella, amitriptyline 25mg nightly, and rizatriptan as needed  - Amitriptyline efficacy has decreased, and the patient experiences persistent fatigue  - Sleep quality is poor, potentially exacerbating headache symptoms  - A new mouth guard has been implemented, but sleep apnea remains a concern    Anxiety:  - Order CT head, contingent on insurance coverage, for patient reassurance  - Educate patient on typical brain tumor symptoms  - Patient expresses concern about potential brain tumor due to long-term Depo-Provera use (20 years)  - No focal neurological symptoms reported  - Previous brain scan performed 5 years ago           Education and counseling provided to patient. Instructed patient to call my office or seek medical  attention immediately if symptoms worsen.  Patient verbalized understanding of information given. All questions were answered. All side effects of drugs were discussed.     Return to clinic in: No follow-ups on file.    Beau Lane MD

## 2025-03-05 NOTE — TELEPHONE ENCOUNTER
Patient in office today and is inquiring about her medication that she has not receive yet. Also she spoke with pharmacy and told her that they were waiting for a qty amount for medication to ship medication to our office. Patient needs to receive medication to her home and should should not be shipped to our office. Please advise. Theres also a TE of 1/9/25.    [facility] - [address] [phone], [phone]       Medication Detail    Medication Quantity Refills Start End   Milnacipran HCl (SAVELLA) 25 MG Oral Tab 180 tablet 3 3/1/2025 5/30/2025   Sig:   Take 25 mg by mouth in the morning and 25 mg before bedtime.     Route:   Oral     Order #:   [id number]

## 2025-03-05 NOTE — PROGRESS NOTES
Sarahi Jones is a 65 year old female who presents for   Chief Complaint   Patient presents with    Follow - Up     Undifferentiated inflammatory arthritis (HCC)    Medication Follow-Up    Lab Results   .   HPI:     I had the pleasure of seeing Sarahi Jones on 1/17/2022 for evaluation.     She is a pleasant 61 year old who has a positive JENNY 1:320 and positive lupus anticoagulant. She has ongoing joitn pain . She was dx with more osteoarthritis and fibromyalgia at [facility] rheumatology , Dr. Gómez  in 9/2021.   She was referred by Dr. Biggs.   She was dx with positive Lupus anticoagulant 3-4 years ago.   She started getting joint pain around 3-4 years ago as well.   She went on medicare in 11/2021.   She was on savella for 2 months and it was helping her pains. And then when she switched insurance she was no longer able to afford or get savella b/c of it's new tier.   She went off celebrex  at that  Time. She was on diclofenac but stopped when she got a foot surgery.     She was tried on gabapentin with neurologist, dr. domínguez  For her migraines but that didn't help.   She recently now on amitprityline 10mg at night which helps. occl she takes 20mg but it makes her feel dopey.   She tried cymbalta/duloxetine in the past but it didn't help her.     She saw dr. Hinojosa, rheumatologist in the past - 2 years ago.  She was offered plaquenil but she didn't want to take.   She takes a muscle relaxer, tizandine for her shoulder but it only helps her on and off.   She did recently get a steroid shot for her left shoulder at [place] in 9/2021 and she's been off the tizandine. She recently is getting the numbness and tingling in her left shoudler.     She has pain with walking and standing up - 3/10 pain.   She has pain mostly in her legs and feet. -   When she gets up in the morning - it starts. Diclofenac would help a little bit . So did the savella.   When she works, as a  the pain starts again.   She  feels in half a day she gets too sore.     She gets bruising in her arms - she was told it was solar purpura by dermatologist at [facility] in 9/2021 -she has gone off nsaids and still getting it.   She does see hematologist Dr. Mcqueen at [facility].     Her right foot sx was done by Dr. Carson in 12/10/2021 - at [facility]. She did not want sx at [facility].     Is on statin   Taking tramadol as needed for right knee pain     2/11/2022    lymes test positive as expected. Filling out report - but this was likely sent in 2/2020.   Pt. Asked what to do - she cont. To have parasthesias in legs - and was not able to see ID afterwards b/c of pandemic   Refer again to ID - for possibly IV abs -   Clarified from notes that she took 1 month of doxycycyline.   Will have ID decide if she still needs IV abx for her neuropathy as neurology was the one to initially check this test.       She saw infecious disease - at [facility] infectious [facility]. She was seen by Dr. Hand. Told it was too late for a course of abx.   She doesn't feel beter on dulxoetine 30mg a day. - she feels tired with it.     She hasn't heard back from pt. assistenace from savella - this helped her but she is on a higher tier now.   She's 3/10 pain - she's on amitritpylien for her neruopathy.   When she is walking.       3/10/2022  savella is not covered.   She never tried LDN  She felt dulxoetine did not help her.   She tried diclofenac again -     She sees hematologist - posiitive lupus anti coagulant -     4/12/2022 -   LDN didn't help.   She saw oringinal ID doctor recently. She is going to start a month of oral abx then if that didn' thelp she was going to put on iv abx for month.     She is wondering since she hit her deductible if she can get savella now.   She is still on tizandine, tramadol and amitriptyline .     6/13/2022  She is back on savella 12.5mg twice a day.   She needs assitance program - b/c it's $199 a month   It seems to be  working.   She's still taking celebrex.   She stopped abx - and it didn't work for a couple of weeks. She talked to ID and lymes probably there for a long time. So she was offered iv abx but she declined for now .     9/13/2022  She applied for the assitance program and she qualified . She has been on savella 25mg bid. She bought a onth of her own.   She's been on this for 2 months.   She's been walking in the pool til labor day. She is hoping she will get into a indoor pool.   She feels the pain I sworse in the morning. The pain is 3-4/10 pain. During the day it's better.   The pain is much better than what it was.     shes' taking amitpriyline 10mg at night - not 20mg - not sleeping as well with this. The migraines are better.   She's off celebrex. She's off nsaids b/c of skin bruises and bleeds.   She is doing better.     Her knees nerves were ablated 2 weeks ago - so she feels better from this as well.   Overall she is better.   She is on tramadol - requests I put it in -       10/7/2022  She is bruising really bad and bleeding badly.   She is totally off nsaids - b/c it started 6 months ago.   The brusiing is getting worse.   It's more bleeding now.   A couple of weeks aog she cut back on the savella 25mg a day.   She saw hematology at [place] and told to stop the savella.   She also saw pcp - - aPTT slightly elevated.     She's not on a blood thinner.     She went to [facility] in 8/2022 and dis labs at [place] .   The hepatologist was seen -   The dermatologist was seen - no biopsy was done  She is vit c deficient.     12/14/2022  Still getting the bruising. She did see dermatology - it's all leo her legs and arms -   She saw [place] and chaz - told it was thin skin - not felt it was from medications.   She had her allergy meds helds.   She went off the savella for 2 weeks and this did not decrease her bruising  She's back on the savella. She's on 25mg twice a day.     She has about 2-3/10 pain. With the change  in the weather.     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   She is getting another opinoin in [place] derm and planning to see arlene [place] as mir.     She's off water pill and still getting bruising.     1/9/2023  Her bruising on her skin is getting worse.   She was told by another dermatologist to stop savella for 2 months.   She has cont bleeding off the think skin after 2-3 weeks.   It's over her arms.     So she would like to stop the savella.   She is interested in treatments that would help while she tries to go off.     2/10/2023  She has a large wound after a broom hit it and ripped the skin off.   She went to ER at [facility] and they sent her to wound clinic  She is on the leflunomide 10mg a day and the pain is not better - it seems t work during the day and then it gets worse as the day goes on.   Her bruising is worse - but her majar bruise was 3 weeks ago - so no new major bruises in the last 3 weeks.   She has seen derm and couldn't not see [facility].     She has new brusies -like on her arms but non have bled out like the other ones.     Her pain is 2-3/10 pain. Since the savella iti's better overall - now she's on leflunoimde -  but it's more noticeable in the afternoon going up and down the stairs.   She fell on her right knee as well and it's sore with the fall 3 weeks ago.       4/24/2023  sh'es' moving better on leflunomide 10mg a day -   By mid afternoon she is worse again   But her rashes are worse - and skin bleeds are wors.e   She had to goot [facility] wound clinic for a skin bleed on her calf. She saw dr. estrada at [place] - once a week for 10 weeks and this helaed her right leg wound.   But now [facility] has a left leg scan   She feels on lelufnomide she is bleeding more and bruising more.     She's offf savlla for 6 months.     6/14/2023   Bruising is much better - she is sitting in the sun. She saw derm at [place] again on 6/1 /2023 -     Called pt. On 5/1/2023 -   And left message that pemphigus labs nomral - can consider siwthcing back to savella or adding savella on to help with the pain   Both leflunomide and savella are helping her with the pain -     The bruising is better in the sun.   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 Told that it was no real cure for this.     She's on lelfunomide 10mg a day and savella 25mg a day.   She went for injection for her neckk in [place] 2 weeks ago - and it's not better yet - it was better for a couple of days - but her hands are still numb .   She was walking in the water when it was warm out.     She has leg and hands are bothering her - and it's raining so the pain is more -     9/18/2023    She's really tired.   She still has the bursiing.  She went down on the leflunomide 10mg every other day.   She's feeling better on the savella 25mg bid. She's better at work.   She is better after increasing the savella.      Still Works parti time - as  on the weekends.    11/6/2023  Needs renewal for savella.     She's doing alright.   She felt her bruising was getting worse and now she feels it's getting a little better.   It's two spots on the right and a few on the left arms.   She has 3-4/10 pain.  She had foot sx on Friday. She is got screws placed for right foot toe correction    1/22/2024  She quit the methotreate - not even a month. - B/c she felt too groggy.- too much daze -   She feels the weather is making her worse in the cold.   She's been back on lelfunomide 10mg a day for 1 yvette   Liver tests 1/16/2024 was normal.   She has about 4-5/10 -   The bruising is still better.   [facility] has long sleeves and long socks and not hitting things   Not working as much b/c slow season.     4/22/2024  She hasn't gotten savella since October and November -   Good rx is still over $300 a month.   Her pain is bad in the morning and late at night.   She feels if she goes to  the bathroom then her joints loosen up.   Closer to the end of the day , it's hard to get up.   She has low energy.   She has about 3-4/10 pain.     7/15/2024  The pain pain is ok - 3/10 pain - walked in the water today   Still bruising and bad fatigue   The fatigue is still bad -   She just got the savella approved - was on this - got her own script to tide her over   She is depressed about the bursiing     3/5/2025  Approved for savella assitance - in 1/2025 -   She hasn't gotten her savella 25mg bid   She feels hand pain -   She feels the lelfunoimde 10mg every other day is ok but not helping much.   She is travelling and wants to go back to work  She still get sbursiing       Wt Readings from Last 2 Encounters:   03/05/25 195 lb 6.4 oz (88.6 kg)   11/14/24 193 lb (87.5 kg)     Body mass index is 36.92 kg/m².      Current Outpatient Medications   Medication Sig Dispense Refill    Milnacipran HCl (SAVELLA) 25 MG Oral Tab Take 25 mg by mouth in the morning and 25 mg before bedtime. 180 tablet 3    semaglutide 8 MG/3ML Subcutaneous Solution Pen-injector Inject 2 mg into the skin once a week. 3 mL 0    AMITRIPTYLINE 10 MG Oral Tab TAKE 2 TABLETS BY MOUTH NIGHTLY 180 tablet 1    traMADol 50 MG Oral Tab Take 1 tablet (50 mg total) by mouth daily as needed. 30 tablet 5    tiZANidine 2 MG Oral Tab Take 1 tablet (2 mg total) by mouth every 8 (eight) hours as needed. 270 tablet 1    LEFLUNOMIDE 10 MG Oral Tab TAKE 1 TABLET BY MOUTH EVERY OTHER DAY 45 tablet 1    FAMOTIDINE 20 MG Oral Tab TAKE 1 TABLET BY MOUTH EVERY DAY AT NIGHT 90 tablet 1    RIZATRIPTAN BENZOATE 10 MG Oral Tab TAKE 1 TABLET BY MOUTH EVERY DAY 9 tablet 11    amitriptyline 25 MG Oral Tab Take 1 tablet (25 mg total) by mouth nightly. 90 tablet 3    pantoprazole 40 MG Oral Tab EC Take 1 tablet (40 mg total) by mouth 2 (two) times daily.      Meloxicam 15 MG Oral Tab Take 1 tablet (15 mg total) by mouth daily.      Milnacipran HCl 25 MG Oral Tab Take 25 mg by  mouth in the morning and 25 mg before bedtime. 180 tablet 3    hydroxychloroquine 200 MG Oral Tab Take 1 tablet (200 mg total) by mouth 2 (two) times daily. 180 tablet 1    Fremanezumab-vfrm (AJOVY) 225 MG/1.5ML Subcutaneous Solution Prefilled Syringe Inject 225 mg into the skin every 3 (three) months. 3 each 3    furosemide 20 MG Oral Tab Take 1 tablet (20 mg total) by mouth 2 (two) times daily.      fluticasone propionate 50 MCG/ACT Nasal Suspension 2 sprays by Nasal route daily.      metoprolol succinate ER 25 MG Oral Tablet 24 Hr Take 1 tablet (25 mg total) by mouth daily.      Potassium Chloride ER 20 MEQ Oral Tab CR Take 1 tablet by mouth daily with food.      Tretinoin 0.05 % External Cream Apply topically nightly.      ibuprofen 800 MG Oral Tab       Vitamin D3, Cholecalciferol, 25 MCG (1000 UT) Oral Tab       rosuvastatin 10 MG Oral Tab Take 1 tablet (10 mg total) by mouth every evening.      Levocetirizine Dihydrochloride 5 MG Oral Tab Take 1 tablet (5 mg total) by mouth once as needed.      Montelukast Sodium 10 MG Oral Tab Take 1 tablet (10 mg total) by mouth daily.  2    PROAIR  (90 Base) MCG/ACT Inhalation Aero Soln USE 1-2 PUFFS EVERY 4-6 HOURS AS NEEDED FOR SHORTNESS OF BREATH. AND 15-30 MIN BEFORE STRENOUS ACTIV  2    EPINEPHrine 0.3 MG/0.3ML Injection Solution Auto-injector Inject 0.3 mL (1 each total) into the muscle.      Na Sulfate-K Sulfate-Mg Sulf 17.5-3.13-1.6 GM/177ML Oral Solution Take 1 tablet by mouth As Directed.        Past Medical History:    Allergic rhinitis    Arthritis    Asthma (HCC)    Fibromyalgia    High blood pressure    Hypercholesterolemia    Migraines    Obesity    PONV (postoperative nausea and vomiting)    Sleep apnea    CPAP PRESCRIBED - PT DOES NOT USE D/T CLAUSTROPHOBIA      Past Surgical History:   Procedure Laterality Date    Carpal tunnel release Bilateral     Colonoscopy  2017    At outside facility reported as normal per patient    Foot surgery Right  12/2021    Knee replacement surgery      Knee surgery Right     Laps gstr rstcv px plmt band      Sinus surgery        Tubal ligation        Family History   Problem Relation Age of Onset    Cancer Mother         breast ca    Migraines Father    no fh of lupus.   1 brother and 1 sister -   2 cousins have MS.    Social History:  Social History     Socioeconomic History    Marital status: Single   Tobacco Use    Smoking status: Never     Passive exposure: Never    Smokeless tobacco: Never    Tobacco comments:     None   Vaping Use    Vaping status: Never Used   Substance and Sexual Activity    Alcohol use: Never    Drug use: Never   Other Topics Concern    Caffeine Concern Yes    Exercise No   Social History Narrative    The patient does not use an assistive device..      The patient does live in a home with stairs.     Social Drivers of Health     Food Insecurity: No Food Insecurity (2/24/2025)    Received from [facility]    Hunger Vital Sign     Worried About Running Out of Food in the Last Year: Never true     Ran Out of Food in the Last Year: Never true   Transportation Needs: No Transportation Needs (2/24/2025)    Received from [facility]    PRAPARE - Transportation     Lack of Transportation (Medical): No     Lack of Transportation (Non-Medical): No   Stress: No Stress Concern Present (5/25/2023)    [facility]    [employer] [place] [employer] - Occupational Stress Questionnaire     Feeling of Stress : Not at all   Housing Stability: Low Risk  (7/10/2024)    Received from [facility]    Housing Stability Vital Sign     Unable to Pay for Housing in the Last Year: No     Number of Places Lived in the Last Year: 1     Unstable Housing in the Last Year: No      Single, 1 son - 26 years old,   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   And also in the past was  working in high school at [employer]       REVIEW OF SYSTEMS:   Review Of Systems:  Fatigue  Constitutional:No fever, no change in weight or appetitie  Derm:  rashes, no oral ulcers, no alopecia, general hair loss,  no photosensitivity, no psoriasis  Gets bruises on her arms mostly - they bleeding easily - noticing over 1 year -   HEENT:  dry eyes, no dry mouth, no Raynaud's, no nasal ulcers, no parotid swelling, sometimes  neck pain, no jaw pain, no temple pain  Eyes: No visual changes,   CVS: No chest pain, no heart disease  RS: No SOB, no Cough, No Pleurtic pain,   GI: No nausea, no vomiiting, no abominal pain, no hx of ulcer, no gastritis, no heartburn, no dyshpagia, no BRBPR or melena  : no dysuria, gets lots of UTIs that she doenst' know she has - no hx of miscarriages, no DVT Hx, no hx of OCP,   Neuro: left arm  numbness or tingling,, her feet are numbwhen she wakes up,  Migraines - once to twice a month - lasts 1 week, depoprovera and takes maxalt and this helped get it under control - had migraines for a long time,  headache, no hx of seizures,   Psych: no hx of anxiety or depression  ENDO: no hx of thyroid disease, no hx of DM  Joint/Muscluskeltal: see HPI, no lower back pain,   All other ROS are negative.     EXAM:   /75   Pulse 97   Resp 16   Ht 5' 1\" (1.549 m)   Wt 195 lb 6.4 oz (88.6 kg)   BMI 36.92 kg/m²   HEENT: Clear oropharynx, no oral ulcers, EOM intact, clear sclear, PERRLA, pleasant, no acute distress, no CAD, no neck tendnerness, good ROM,   CVS: RRR, no murmurs  RS: CTAB, no crackles, no rhonchi  ABD: Soft Non tender, no HSM felt, BS positive  Joint exam:   Tender points - in lower legs improved   Tender in 1-5th mcps and pips - no syvnoitis seen   No upper extremities tenderness.   No lower back tenderness  No neck tenderness   Squeeze test negative   Healed bruise over left  and right pretibial area -  Healed bruise over left forearm -   Scattered pinpoint lesions  that were bleeding per pt - now healed.       Component      Latest Ref Rng & Units 2/6/2020   MYELOPEROX ANTIBODIES, IGG      0 - 19 AU/mL 0   SERINE PROTEASE3, IGG      0 - 19 AU/mL 5   B. BURGDORFERI, IGG WB      Negative Positive (A)   B. BURGDORFERI AB, IGM BY WB      Negative Negative   JENNY Titer/Pattern      <80 160 (A)   Reviewed By:       Sascha Medrano M.D.   Anti-Sjogren's A      Negative Negative   Anti-Sjogren's B      Negative Negative   Anti-Smith Antibody      Negative Negative   Anti-Wheeler/RNP Antibody      Negative Negative   Cardiolipin Antibody, IgA      0 - 11 APL 0   Cardiolipin IgG Antibody      0.0 - 14.9 GPL 4.7   Cardiolipin IgM Antibody      0.0 - 12.4 MPL 15.7 (H)   RHEUMATOID FACTOR      <15 IU/mL <10   JENNY SCREEN WITH REFLEX (S)      Negative Positive (A)   Lyme Screen IgG and IgM      Negative Equivocal   Gliadin Deamidated IgG Ab      <7.0 U/mL 0.5   Tissue Transglutaminase IgA Ab      <7.0 U/mL 0.2   ENDOMYSIAL ANTIBODY, IGA BY IF      <1:10 <1:10   Gliadin Deamidated IgA Ab      <7.0 U/mL 18.0 (H)   IMMUNOGLOBULIN A      70.00 - 312.00 mg/dL 132.00   Anti Double Strand DNA      <10 <10       Component      Latest Ref Rng & Units 1/17/2022   B. BURGDORFERI, IGG WB      Negative Positive (A)   B. BURGDORFERI AB, IGM BY WB      Negative Negative   ALDOLASE, SERUM      1.5 - 8.1 U/L 5.3   CK      26 - 192 U/L 67   C-REACTIVE PROTEIN      <0.30 mg/dL 1.14 (H)   SED RATE      0 - 30 mm/Hr 24   Lyme Screen IgG and IgM      Negative Positive (A)               Component      Latest Ref Rng & Units 1/17/2022   B. BURGDORFERI, IGG WB      Negative Positive (A)   B. BURGDORFERI AB, IGM BY WB      Negative Negative   ALDOLASE, SERUM      1.5 - 8.1 U/L 5.3   CK      26 - 192 U/L 67   C-REACTIVE PROTEIN      <0.30 mg/dL 1.14 (H)   SED RATE      0 - 30 mm/Hr 24   Lyme Screen IgG and IgM      Negative Positive (A)                           Component      Latest Ref Rng 3/3/2025   WBC      4.0 -  11.0 x10(3) uL 8.4    RBC      3.80 - 5.30 x10(6)uL 4.57    Hemoglobin      12.0 - 16.0 g/dL 13.2    Hematocrit      35.0 - 48.0 % 41.3    MCV      80.0 - 100.0 fL 90.4    MCH      26.0 - 34.0 pg 28.9    MCHC      31.0 - 37.0 g/dL 32.0    RDW      11.0 - 15.0 % 14.6    RDW-SD      35.1 - 46.3 fL 49.1 (H)    Platelet Count      150.0 - 450.0 10(3)uL 212.0    CREATININE      0.55 - 1.02 mg/dL 0.90    EGFR      >=60 mL/min/1.73m2 71    AST (SGOT)      <34 U/L 17    C-REACTIVE PROTEIN      <1.00 mg/dL <0.40    SED RATE      0 - 30 mm/Hr 47 (H)    ALT (SGPT)      10 - 49 U/L 9 (L)       Legend:  (H) High  (L) Low  ASSESSMENT AND PLAN:   Sarahi Jones is a 65 year old female who presents for   Chief Complaint   Patient presents with    Follow - Up     Undifferentiated inflammatory arthritis (HCC)    Medication Follow-Up    Lab Results       1. Fibromyalgia/osteoarthritis - joitn pain jayce in legs , elevated sed rateand crp   - possibly seronegative RA - overalap  Doing better on savella - back on this 25mg twice a day  -just started -will make sure dose is approve d- approved for assitance in jan 2025 -    tried  going off for 2 months with no improvement of her skin bleeding .   On savella she is at least - this is working for her - she feels 40-50% better -   - stopped around 1/9/2023 - - restaretd in 5/2023 - having trouble now with the shipmenets   Cont. savella 25mg bid   - cont.amitriptylien 20mg at night   tramdaol 50mg prn   - cont.Lefunomide 10mg every other day - tapered due to elevated LFTS - now normal  - but now elevated sed rate again and more stiffess  But she has more inflammation now - and stffness is worse.   Tried methotrexate 10mg a week and fa 1mg a day  - was  too groggy -   Tried - hydroxychlroquine 200mg twice a day - smita not help her -stopped  after 2 months.   Tramadol usually given by pcp - I will refill and take over - since she states she hasn't seen her pcp in a long time -     In the past:    She has had no luck with any other medications helping her.  Was on savella in the past - now not able to take b/c of her insurance coverage, can't afford it - b/c it's on a different tier, se tried pt. assitance with dr. Garíca,   Tried hcq for 1month - and then stopped it - b/c savella is working for her.   In the past hcq never helped her.   No improvement on  LDN 4.5mg a day    No better on dulxoetine 30mg a day - not better with  30mg twice a day -   In the past treid gabapentin- weight gain, lyrica didn't help,         Would try since all other options have failed and this has given some succes to patients.   Can't get good rx or assiteance for savella   Takes tizandine - helps with arm pain - doesn't help with leg pains    2. brusiing over legs - getting better.   with bleeding into the skin with hx of lupus anticoagulation -   Garnder-miladys syndrome -   Consider if her rashes are related to her lymes   [facility] -     brusiing of skin has been going on for months - longer than starting the savella, savella has causes some brusiing for her in the past   The bleeding into the skin is toally new in the last few months.   Tried stopping savella for 2 weeks but the brusinign continued. So she is back on savella - but still has bruises   - off  savella for at least 2 months = stopped 1/9/2023 - went off  off for 3 months   Started on leflunomide - and feels bruising and bleeding is worsening but overall it's been stabe on this     -not  pemphigous - ? Foliaceus type desmoglein 1 and 3negative    [place]) and hematology last week at [facility] (unable to review records)   Saw [place] again in 10/2022   Saw Dr. Mena at [place] in 11/2022 -   Seen dermatology a few times.     She's not on warfarin that causes skin necrosis or nsaids that cause pseudoreaction in the sun   Lupus anticoagulant usually not assoactiaed with this kind of brusiing    It's not a typical look for vasculitis eitehr   She doesn't decribe bullous lesions seen with pemphigoid or TEN either -  y  Doesn't appear like pyoderma gangrenosum   Appears like nsaids related pseudoreactoin but she is off all nadis.         3. undifferentiated connective tissue disease -  - Positive JENNY 1:320 and lupus anticoagulant - no raynaud's, easy bruising in arms , hx of migraines   Elevated esr and crp-  Slightly elevated esr and crp -   Work up for lupus has been negative per rheumatology at [facility]   Hx of hcq - did not help her -   -d/w her to try methotrexate - she declined - b/c of hair loss possiblity   - stopped  leflunomide every 10mg every other  day - started in 1/2023- doing better on this til 11/2023  Now back on it since 12/2023   Asked her to Taper again to every other day due to hx of elevated lfts - -   Off methotrexaye 10mg a  weel and fa 1mg a day- too groggy with this.   Add back hcq 200mg bid - she tried it for 1 month in the past - d/w her to give it at least 2 month s  Discussed risks and benefits of medication with patient , including retinal toxicity risk and importance of regular monitoring on the medication.     Rtc in 3-4  months   ;abs in 3-4 months     She feels bruising is worse on this - it is also helping the pain   - cont. For now - will see if bruising improves off savella for a longer time and see if leflunomide helps with the pain and inflammation    Hematology follow b/c of bruising - no clear etiology - not releated to lupus anticoaglunat at this time.     4. Right knee osteoarthritis - s/p right knee arthroplasty - in 10/2018  Still hurts   Takes tramadol 50mg a day   Tried diclofenac and celebrex in the past.   meloxicam didn't help.   Diclofenac helped a little bit. VOLTAREN GEL 1 % topical helped a litlte bit.   Had sx at [facility] - will be disucssing with Dr. King to see if she needs another sx.       5. Left shoulder pain - better with steroid injection in 9/2021  - assc numbness and tingling.      6. Lyme disease - in 2/2020 - pos. Burgdorferi. Saw ID - treated with abx 1 month of doxycycline. She felt better after the oral abx but then felt bad again.   She was not able to return to [facility] b/c of Covid lockdown happened in 3/2020. Never got the disucssion if she needed IV abx.   Has numbness in legs til she gets motivated.   She had EMG   She feels her feet and legs started around the time she was dx with lymes.   [facility] on 2/10/2022 - they did not recommend further treatment = -  Has another follow up -   [facility] appointment. With [facility]  - not keen on going on iv abx     7. Neuropathy /headaches - on amitriptyline 10mg at night - increase to 20mg a day - since 25mg makes her too drowsy     8. Hx of candida esophagitis - in 12/2022 - egd - treatedw ith anti fungal      Summary:  1.  Cont.  leflunomide 10mg every other day   2.Cont.  Savella 25mg twice a day in the future  - Pt. Assistance form for savella filled out in 4/2024 -   3.  Cont. amitrpityline 25mg at night- f/u with neurology  4. . Cont. Tramadol 50mg a day as needed.   5.. Cont. tizandine for arm pain as needed  6.  Return to clinic in 4 -6 month  7. Labs in 4-6 months.   8. Ibuprofen 800mg as needed     [email or web address]    -consider CBD oil    Shara Michelle MD  3/5/2025   12:12 PM         is applicable because the patient's medical record notes reflects the ongoing nature of the continuous longitudinal relationship of care, and the medical record indicates that there is ongoing treatment of a serious/complex medical condition.

## 2025-03-05 NOTE — PATIENT INSTRUCTIONS
1.  Cont.  leflunomide 10mg every other day   2.Cont.  Savella 25mg twice a day in the future  - Pt. Assistance form for savella filled out in 4/2024 -   3.  Cont. amitrpityline 25mg at night- f/u with neurology  4. . Cont. Tramadol 50mg a day as needed.   5.. Cont. tizandine for arm pain as needed  6.  Return to clinic in 4 -6 month  7. Labs in 4-6 months.   8. Ibuprofen 800mg as needed

## 2025-03-25 ENCOUNTER — TELEPHONE (OUTPATIENT)
Dept: RHEUMATOLOGY | Facility: CLINIC | Age: 65
End: 2025-03-25

## 2025-03-25 NOTE — TELEPHONE ENCOUNTER
Patient called to reschedule her Sept appointment earlier with Dr. Cristina, When told there is no availabilyt she asked for a note to be sent for the request for earlier appointment.     Please call patient - [phone]

## 2025-03-25 NOTE — TELEPHONE ENCOUNTER
Lmtcb. Dr Michelle is leaving the organization. Please confirm if she wants to switch providers.

## 2025-04-04 ENCOUNTER — OFFICE VISIT (OUTPATIENT)
Age: 65
End: 2025-04-04
Payer: MEDICARE

## 2025-04-04 VITALS
RESPIRATION RATE: 16 BRPM | HEIGHT: 61 IN | DIASTOLIC BLOOD PRESSURE: 66 MMHG | BODY MASS INDEX: 36.29 KG/M2 | HEART RATE: 104 BPM | WEIGHT: 192.19 LBS | SYSTOLIC BLOOD PRESSURE: 106 MMHG

## 2025-04-04 DIAGNOSIS — M19.90 UNDIFFERENTIATED INFLAMMATORY ARTHRITIS: ICD-10-CM

## 2025-04-04 DIAGNOSIS — M06.00 RHEUMATOID ARTHRITIS WITH NEGATIVE RHEUMATOID FACTOR, INVOLVING UNSPECIFIED SITE (HCC): Primary | ICD-10-CM

## 2025-04-04 DIAGNOSIS — M79.7 FIBROMYALGIA: ICD-10-CM

## 2025-04-04 DIAGNOSIS — Z51.81 THERAPEUTIC DRUG MONITORING: ICD-10-CM

## 2025-04-04 PROCEDURE — G2211 COMPLEX E/M VISIT ADD ON: HCPCS | Performed by: INTERNAL MEDICINE

## 2025-04-04 PROCEDURE — 99214 OFFICE O/P EST MOD 30 MIN: CPT | Performed by: INTERNAL MEDICINE

## 2025-04-04 RX ORDER — IBUPROFEN 800 MG/1
800 TABLET, FILM COATED ORAL EVERY 12 HOURS PRN
Qty: 180 TABLET | Refills: 0 | Status: SHIPPED | OUTPATIENT
Start: 2025-04-04

## 2025-04-04 NOTE — PATIENT INSTRUCTIONS
1.  Cont.  leflunomide 10mg every other day   2.Cont.  Savella 25mg twice a day in the future  - Pt. Assistance form for savella filled out every December   3.  Cont. amitrpityline 25mg at night- f/u with neurology  4. . Cont. Tramadol 50mg a day as needed.   5.. Cont. tizandine for arm pain as needed  6.  Return to clinic in 4 -6 month  7. Labs in 4-6 months.   8. Ibuprofen 800mg as needed

## 2025-04-04 NOTE — PROGRESS NOTES
Sarahi Jones is a 65 year old female who presents for   Chief Complaint   Patient presents with    Fibromyalgia    Medication Follow-Up    Lab Results   .   HPI:     I had the pleasure of seeing Sarahi Jones on 1/17/2022 for evaluation.     She is a pleasant 61 year old who has a positive JENNY 1:320 and positive lupus anticoagulant. She has ongoing joitn pain . She was dx with more osteoarthritis and fibromyalgia at [facility] rheumatology , Dr. Gómez  in 9/2021.   She was referred by Dr. Biggs.   She was dx with positive Lupus anticoagulant 3-4 years ago.   She started getting joint pain around 3-4 years ago as well.   She went on medicare in 11/2021.   She was on savella for 2 months and it was helping her pains. And then when she switched insurance she was no longer able to afford or get savella b/c of it's new tier.   She went off celebrex  at that  Time. She was on diclofenac but stopped when she got a foot surgery.     She was tried on gabapentin with neurologist, dr. domínguez  For her migraines but that didn't help.   She recently now on amitprityline 10mg at night which helps. occl she takes 20mg but it makes her feel dopey.   She tried cymbalta/duloxetine in the past but it didn't help her.     She saw dr. Hinojosa, rheumatologist in the past - 2 years ago.  She was offered plaquenil but she didn't want to take.   She takes a muscle relaxer, tizandine for her shoulder but it only helps her on and off.   She did recently get a steroid shot for her left shoulder at [facility] in 9/2021 and she's been off the tizandine. She recently is getting the numbness and tingling in her left shoudler.     She has pain with walking and standing up - 3/10 pain.   She has pain mostly in her legs and feet. -   When she gets up in the morning - it starts. Diclofenac would help a little bit . So did the savella.   When she works, as a  the pain starts again.   She feels in half a day she gets too sore.     She gets  bruising in her arms - she was told it was solar purpura by dermatologist at [facility] in 9/2021 -she has gone off nsaids and still getting it.   She does see hematologist Dr. Mcqueen at [facility].     Her right foot sx was done by Dr. Carson in 12/10/2021 - at [facility]. She did not want sx at [facility].     Is on statin   Taking tramadol as needed for right knee pain     2/11/2022    lymes test positive as expected. Filling out report - but this was likely sent in 2/2020.   Pt. Asked what to do - she cont. To have parasthesias in legs - and was not able to see ID afterwards b/c of pandemic   Refer again to ID - for possibly IV abs -   Clarified from notes that she took 1 month of doxycycyline.   Will have ID decide if she still needs IV abx for her neuropathy as neurology was the one to initially check this test.       She saw infecious disease - at [facility]. She was seen by Dr. Hand. Told it was too late for a course of abx.   She doesn't feel beter on dulxoetine 30mg a day. - she feels tired with it.     She hasn't heard back from pt. assistenace from savella - this helped her but she is on a higher tier now.   She's 3/10 pain - she's on amitritpylien for her neruopathy.   When she is walking.       3/10/2022  savella is not covered.   She never tried LDN  She felt dulxoetine did not help her.   She tried diclofenac again -     She sees hematologist - posiitive lupus anti coagulant -     4/12/2022 -   LDN didn't help.   She saw oringinal ID doctor recently. She is going to start a month of oral abx then if that didn' thelp she was going to put on iv abx for month.     She is wondering since she hit her deductible if she can get savella now.   She is still on tizandine, tramadol and amitriptyline .     6/13/2022  She is back on savella 12.5mg twice a day.   She needs assitance program - b/c it's $199 a month   It seems to be working.   She's still taking celebrex.   She stopped abx  - and it didn't work for a couple of weeks. She talked to ID and lymes probably there for a long time. So she was offered iv abx but she declined for now .     9/13/2022  She applied for the assitance program and she qualified . She has been on savella 25mg bid. She bought a onth of her own.   She's been on this for 2 months.   She's been walking in the pool til labor day. She is hoping she will get into a indoor pool.   She feels the pain I sworse in the morning. The pain is 3-4/10 pain. During the day it's better.   The pain is much better than what it was.     shes' taking amitpriyline 10mg at night - not 20mg - not sleeping as well with this. The migraines are better.   She's off celebrex. She's off nsaids b/c of skin bruises and bleeds.   She is doing better.     Her knees nerves were ablated 2 weeks ago - so she feels better from this as well.   Overall she is better.   She is on tramadol - requests I put it in -       10/7/2022  She is bruising really bad and bleeding badly.   She is totally off nsaids - b/c it started 6 months ago.   The brusiing is getting worse.   It's more bleeding now.   A couple of weeks aog she cut back on the savella 25mg a day.   She saw hematology at [place] and told to stop the savella.   She also saw pcp - - aPTT slightly elevated.     She's not on a blood thinner.     She went to [facility] in 8/2022 and dis labs at [place] .   The hepatologist was seen -   The dermatologist was seen - no biopsy was done  She is vit c deficient.     12/14/2022  Still getting the bruising. She did see dermatology - it's all leo her legs and arms -   She saw [place] and chaz - told it was thin skin - not felt it was from medications.   She had her allergy meds helds.   She went off the savella for 2 weeks and this did not decrease her bruising  She's back on the savella. She's on 25mg twice a day.     She has about 2-3/10 pain. With the change in the weather.     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   She is getting another opinoin in [place] derm and planning to see [facility] as mir.     She's off water pill and still getting bruising.     1/9/2023  Her bruising on her skin is getting worse.   She was told by another dermatologist to stop savella for 2 months.   She has cont bleeding off the think skin after 2-3 weeks.   It's over her arms.     So she would like to stop the savella.   She is interested in treatments that would help while she tries to go off.     2/10/2023  She has a large wound after a broom hit it and ripped the skin off.   She went to ER at [facility] and they sent her to wound clinic  She is on the leflunomide 10mg a day and the pain is not better - it seems t work during the day and then it gets worse as the day goes on.   Her bruising is worse - but her majar bruise was 3 weeks ago - so no new major bruises in the last 3 weeks.   She has seen derm and couldn't not see [facility].     She has new brusies -like on her arms but non have bled out like the other ones.     Her pain is 2-3/10 pain. Since the savella iti's better overall - now she's on leflunoimde -  but it's more noticeable in the afternoon going up and down the stairs.   She fell on her right knee as well and it's sore with the fall 3 weeks ago.       4/24/2023  sh'es' moving better on leflunomide 10mg a day -   By mid afternoon she is worse again   But her rashes are worse - and skin bleeds are wors.e   She had to goot [facility] wound clinic for a skin bleed on her calf. She saw dr. estrada at [place] - once a week for 10 weeks and this helaed her right leg wound.   But now e has a left leg scan   She feels on lelufnomide she is bleeding more and bruising more.     She's offf savlla for 6 months.     6/14/2023   Bruising is much better - she is sitting in the sun. She saw derm at [place] again on 6/1 /2023 -     Called pt. On 5/1/2023 -  And left message that pemphigus labs nomral - can  consider siwthcing back to savella or adding savella on to help with the pain   Both leflunomide and savella are helping her with the pain -     The bruising is better in the sun.   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 Told that it was no real cure for this.     She's on lelfunomide 10mg a day and savella 25mg a day.   She went for injection for her neckk in [place] 2 weeks ago - and it's not better yet - it was better for a couple of days - but her hands are still numb .   She was walking in the water when it was warm out.     She has leg and hands are bothering her - and it's raining so the pain is more -     9/18/2023    She's really tired.   She still has the bursiing.  She went down on the leflunomide 10mg every other day.   She's feeling better on the savella 25mg bid. She's better at work.   She is better after increasing the savella.      Still Works parti time - as  on the weekends.    11/6/2023  Needs renewal for savella.     She's doing alright.   She felt her bruising was getting worse and now she feels it's getting a little better.   It's two spots on the right and a few on the left arms.   She has 3-4/10 pain.  She had foot sx on Friday. She is got screws placed for right foot toe correction    1/22/2024  She quit the methotreate - not even a month. - B/c she felt too groggy.- too much daze -   She feels the weather is making her worse in the cold.   She's been back on lelfunomide 10mg a day for 1 yvette   Liver tests 1/16/2024 was normal.   She has about 4-5/10 -   The bruising is still better.   Se has long sleeves and long socks and not hitting things   Not working as much b/c slow season.     4/22/2024  She hasn't gotten savella since October and November -   Good rx is still over $300 a month.   Her pain is bad in the morning and late at night.   She feels if she goes to the bathroom then her joints loosen up.   Closer  to the end of the day , it's hard to get up.   She has low energy.   She has about 3-4/10 pain.     7/15/2024  The pain pain is ok - 3/10 pain - walked in the water today   Still bruising and bad fatigue   The fatigue is still bad -   She just got the savella approved - was on this - got her own script to tide her over   She is depressed about the bursiing     3/5/2025  Approved for savella assitance - in 1/2025 -   She hasn't gotten her savella 25mg bid   She feels hand pain -   She feels the lelfunoimde 10mg every other day is ok but not helping much.   She is travelling and wants to go back to work  She still get sbursiing     4/4/2025  She got the savella 25mg bid   She does also feelt better with leflunomide 10mg every other day.   She is doing ok - about the same.   She has some pain.   She doesn't want to increase the leflunomide 10mg a day - b/c she gets more bruising    Wt Readings from Last 2 Encounters:   04/04/25 192 lb 3.2 oz (87.2 kg)   03/05/25 195 lb 6.4 oz (88.6 kg)     Body mass index is 36.32 kg/m².      Current Outpatient Medications   Medication Sig Dispense Refill    leflunomide 10 MG Oral Tab Take 1 tablet (10 mg total) by mouth every other day. 45 tablet 1    ibuprofen 800 MG Oral Tab Take 1 tablet (800 mg total) by mouth every 12 (twelve) hours as needed for Pain. 180 tablet 0    traMADol 50 MG Oral Tab Take 1 tablet (50 mg total) by mouth daily as needed. 30 tablet 5    tiZANidine 2 MG Oral Tab Take 1 tablet (2 mg total) by mouth every 8 (eight) hours as needed. 270 tablet 1    Atogepant (QULIPTA) 30 MG Oral Tab Take 30 mg by mouth daily. 30 tablet 0    amitriptyline 50 MG Oral Tab Take 1 tablet (50 mg total) by mouth nightly. 90 tablet 3    Milnacipran HCl (SAVELLA) 25 MG Oral Tab Take 25 mg (1 tab) by mouth in the morning and 25 mg (1 tab) before bedtime. 180 tablet 0    Milnacipran HCl (SAVELLA) 25 MG Oral Tab Take 25 mg by mouth in the morning and 25 mg before bedtime. 180 tablet 3     semaglutide 8 MG/3ML Subcutaneous Solution Pen-injector Inject 2 mg into the skin once a week. 3 mL 0    FAMOTIDINE 20 MG Oral Tab TAKE 1 TABLET BY MOUTH EVERY DAY AT NIGHT 90 tablet 1    RIZATRIPTAN BENZOATE 10 MG Oral Tab TAKE 1 TABLET BY MOUTH EVERY DAY 9 tablet 11    pantoprazole 40 MG Oral Tab EC Take 1 tablet (40 mg total) by mouth 2 (two) times daily.      hydroxychloroquine 200 MG Oral Tab Take 1 tablet (200 mg total) by mouth 2 (two) times daily. 180 tablet 1    Fremanezumab-vfrm (AJOVY) 225 MG/1.5ML Subcutaneous Solution Prefilled Syringe Inject 225 mg into the skin every 3 (three) months. 3 each 3    furosemide 20 MG Oral Tab Take 1 tablet (20 mg total) by mouth 2 (two) times daily.      fluticasone propionate 50 MCG/ACT Nasal Suspension 2 sprays by Nasal route daily.      metoprolol succinate ER 25 MG Oral Tablet 24 Hr Take 1 tablet (25 mg total) by mouth daily.      Potassium Chloride ER 20 MEQ Oral Tab CR Take 1 tablet by mouth daily with food.      Tretinoin 0.05 % External Cream Apply topically nightly.      Vitamin D3, Cholecalciferol, 25 MCG (1000 UT) Oral Tab       rosuvastatin 10 MG Oral Tab Take 1 tablet (10 mg total) by mouth every evening.      Levocetirizine Dihydrochloride 5 MG Oral Tab Take 1 tablet (5 mg total) by mouth once as needed.      Montelukast Sodium 10 MG Oral Tab Take 1 tablet (10 mg total) by mouth daily.  2    PROAIR  (90 Base) MCG/ACT Inhalation Aero Soln USE 1-2 PUFFS EVERY 4-6 HOURS AS NEEDED FOR SHORTNESS OF BREATH. AND 15-30 MIN BEFORE STRENOUS ACTIV  2    EPINEPHrine 0.3 MG/0.3ML Injection Solution Auto-injector Inject 0.3 mL (1 each total) into the muscle.        Past Medical History:    Allergic rhinitis    Arthritis    Asthma (HCC)    Fibromyalgia    High blood pressure    Hypercholesterolemia    Migraines    Obesity    PONV (postoperative nausea and vomiting)    Sleep apnea    CPAP PRESCRIBED - PT DOES NOT USE D/T CLAUSTROPHOBIA      Past Surgical History:    Procedure Laterality Date    Carpal tunnel release Bilateral     Colonoscopy  2017    At outside facility reported as normal per patient    Foot surgery Right 12/2021    Knee replacement surgery      Knee surgery Right     Laps gstr rstcv px plmt band      Sinus surgery        Tubal ligation        Family History   Problem Relation Age of Onset    Cancer Mother         breast ca    Migraines Father    no fh of lupus.   1 brother and 1 sister -   2 cousins have MS.    Social History:  Social History     Socioeconomic History    Marital status: Single   Tobacco Use    Smoking status: Never     Passive exposure: Never    Smokeless tobacco: Never    Tobacco comments:     None   Vaping Use    Vaping status: Never Used   Substance and Sexual Activity    Alcohol use: Never    Drug use: Never   Other Topics Concern    Caffeine Concern Yes    Exercise No   Social History Narrative    The patient does not use an assistive device..      The patient does live in a home with stairs.     Social Drivers of Health     Food Insecurity: No Food Insecurity (2/24/2025)    Received from [facility]    Hunger Vital Sign     Worried About Running Out of Food in the Last Year: Never true     Ran Out of Food in the Last Year: Never true   Transportation Needs: No Transportation Needs (2/24/2025)    Received from [facility]    PRAPARE - Transportation     Lack of Transportation (Medical): No     Lack of Transportation (Non-Medical): No   Stress: No Stress Concern Present (5/25/2023)    [facility]    [employer] - Occupational Stress Questionnaire     Feeling of Stress : Not at all   Housing Stability: Low Risk  (7/10/2024)    Received from 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   And also in the past was working in high school at [employer]       REVIEW OF SYSTEMS:   Review Of Systems:  Fatigue  Constitutional:No fever, no change in weight or appetitie  Derm:  rashes, no oral ulcers, no alopecia, general hair loss,  no photosensitivity, no psoriasis  Gets bruises on her arms mostly - they bleeding easily - noticing over 1 year -   HEENT:  dry eyes, no dry mouth, no Raynaud's, no nasal ulcers, no parotid swelling, sometimes  neck pain, no jaw pain, no temple pain  Eyes: No visual changes,   CVS: No chest pain, no heart disease  RS: No SOB, no Cough, No Pleurtic pain,   GI: No nausea, no vomiiting, no abominal pain, no hx of ulcer, no gastritis, no heartburn, no dyshpagia, no BRBPR or melena  : no dysuria, gets lots of UTIs that she doenst' know she has - no hx of miscarriages, no DVT Hx, no hx of OCP,   Neuro: left arm  numbness or tingling,, her feet are numbwhen she wakes up,  Migraines - once to twice a month - lasts 1 week, depoprovera and takes maxalt and this helped get it under control - had migraines for a long time,  headache, no hx of seizures,   Psych: no hx of anxiety or depression  ENDO: no hx of thyroid disease, no hx of DM  Joint/Muscluskeltal: see HPI, no lower back pain,   All other ROS are negative.     EXAM:   /66   Pulse 104   Resp 16   Ht 5' 1\" (1.549 m)   Wt 192 lb 3.2 oz (87.2 kg)   BMI 36.32 kg/m²   HEENT: Clear oropharynx, no oral ulcers, EOM intact, clear sclear, PERRLA, pleasant, no acute distress, no CAD, no neck tendnerness, good ROM,   CVS: RRR, no murmurs  RS: CTAB, no crackles, no rhonchi  ABD: Soft Non tender, no HSM felt, BS positive  Joint exam:   Tender points - in lower legs improved   Tender in 1-5th mcps and pips - no syvnoitis seen   No upper extremities tenderness.   No lower back tenderness  No  neck tenderness   Squeeze test negative   Healed bruise over left  and right pretibial area -  Healed bruise over left forearm -   Scattered pinpoint lesions that were bleeding per pt - now healed.       Component      Latest Ref Rng & Units 2/6/2020   MYELOPEROX ANTIBODIES, IGG      0 - 19 AU/mL 0   SERINE PROTEASE3, IGG      0 - 19 AU/mL 5   B. BURGDORFERI, IGG WB      Negative Positive (A)   B. BURGDORFERI AB, IGM BY WB      Negative Negative   JENNY Titer/Pattern      <80 160 (A)   Reviewed By:       Sascha Medrano M.D.   Anti-Sjogren's A      Negative Negative   Anti-Sjogren's B      Negative Negative   Anti-Smith Antibody      Negative Negative   Anti-Wheeler/RNP Antibody      Negative Negative   Cardiolipin Antibody, IgA      0 - 11 APL 0   Cardiolipin IgG Antibody      0.0 - 14.9 GPL 4.7   Cardiolipin IgM Antibody      0.0 - 12.4 MPL 15.7 (H)   RHEUMATOID FACTOR      <15 IU/mL <10   JENNY SCREEN WITH REFLEX (S)      Negative Positive (A)   Lyme Screen IgG and IgM      Negative Equivocal   Gliadin Deamidated IgG Ab      <7.0 U/mL 0.5   Tissue Transglutaminase IgA Ab      <7.0 U/mL 0.2   ENDOMYSIAL ANTIBODY, IGA BY IF      <1:10 <1:10   Gliadin Deamidated IgA Ab      <7.0 U/mL 18.0 (H)   IMMUNOGLOBULIN A      70.00 - 312.00 mg/dL 132.00   Anti Double Strand DNA      <10 <10       Component      Latest Ref Rng & Units 1/17/2022   B. BURGDORFERI, IGG WB      Negative Positive (A)   B. BURGDORFERI AB, IGM BY WB      Negative Negative   ALDOLASE, SERUM      1.5 - 8.1 U/L 5.3   CK      26 - 192 U/L 67   C-REACTIVE PROTEIN      <0.30 mg/dL 1.14 (H)   SED RATE      0 - 30 mm/Hr 24   Lyme Screen IgG and IgM      Negative Positive (A)               Component      Latest Ref Rng & Units 1/17/2022   B. BURGDORFERI, IGG WB      Negative Positive (A)   B. BURGDORFERI AB, IGM BY WB      Negative Negative   ALDOLASE, SERUM      1.5 - 8.1 U/L 5.3   CK      26 - 192 U/L 67   C-REACTIVE PROTEIN      <0.30 mg/dL 1.14 (H)   SED RATE       0 - 30 mm/Hr 24   Lyme Screen IgG and IgM      Negative Positive (A)                           Component      Latest Ref Rng 11/14/2024 3/3/2025   WBC      4.0 - 11.0 x10(3) uL 7.5  8.4    RBC      3.80 - 5.30 x10(6)uL 4.19  4.57    Hemoglobin      12.0 - 16.0 g/dL 12.3  13.2    Hematocrit      35.0 - 48.0 % 37.9  41.3    MCV      80.0 - 100.0 fL 90.5  90.4    MCH      26.0 - 34.0 pg 29.4  28.9    MCHC      31.0 - 37.0 g/dL 32.5  32.0    RDW      11.0 - 15.0 % 14.6  14.6    RDW-SD      35.1 - 46.3 fL 48.1 (H)  49.1 (H)    Platelet Count      150.0 - 450.0 10(3)uL 259.0  212.0    CREATININE      0.55 - 1.02 mg/dL 0.91  0.90    EGFR      >=60 mL/min/1.73m2 70  71    ALT (SGPT)      10 - 49 U/L 20  9 (L)    SED RATE      0 - 30 mm/Hr 17  47 (H)    C-REACTIVE PROTEIN      <1.00 mg/dL <0.40  <0.40    AST (SGOT)      <34 U/L 22  17       Legend:  (H) High  (L) Low  (L) Low  ASSESSMENT AND PLAN:   Sarahi Jones is a 65 year old female who presents for   Chief Complaint   Patient presents with    Fibromyalgia    Medication Follow-Up    Lab Results       1. Lymes arthritis--> seronegtive RA  with overlap of Fibromyalgia/osteoarthritis - joitn pain jayce in legs , elevated sed rateand crp   - possibly seronegative RA - overalap  Doing better on savella - back on this 25mg twice a day  -just started -will make sure dose is approve d- approved for assitance in jan 2025 -    tried  going off for 2 months with no improvement of her skin bleeding .   On savella she is at least - this is working for her - she feels 40-50% better -   - stopped around 1/9/2023 - - restaretd in 5/2023 - having trouble now with the shipmenets   Cont. savella 25mg bid   - cont.amitriptylien 20mg at night   tramdaol 50mg prn   - cont.Lefunomide 10mg every other day - tapered due to elevated LFTS - now normal  - but now elevated sed rate again and more stiffess  But she has more inflammation now - and stffness is worse.   Tried methotrexate 10mg a week  and fa 1mg a day  - was  too groggy -   Tried - hydroxychlroquine 200mg twice a day - smita not help her -stopped  after 2 months.   Tramadol usually given by pcp - I will refill and take over - since she states she hasn't seen her pcp in a long time -     In the past:   She has had no luck with any other medications helping her.  Was on savella in the past - now not able to take b/c of her insurance coverage, can't afford it - b/c it's on a different tier, seh tried pt. assitance with dr. García,   Tried hcq for 1month - and then stopped it - b/c savella is working for her.   In the past hcq never helped her.   No improvement on  LDN 4.5mg a day    No better on dulxoetine 30mg a day - not better with  30mg twice a day -   In the past treid gabapentin- weight gain, lyrica didn't help,         Would try since all other options have failed and this has given some succes to patients.   Can't get good rx or assiteance for savella   Takes tizandine - helps with arm pain - doesn't help with leg pains    2. brusiing over legs - getting better.   with bleeding into the skin with hx of lupus anticoagulation -   Garnder-miladys syndrome -   Consider if her rashes are related to her lymes   Follow up dr. Garcia at [facility] -     brusiing of skin has been going on for months - longer than starting the savella, savella has causes some brusiing for her in the past   The bleeding into the skin is toally new in the last few months.   Tried stopping savella for 2 weeks but the brusinign continued. So she is back on savella - but still has bruises   - off  savella for at least 2 months = stopped 1/9/2023 - went off  off for 3 months   Started on leflunomide - and feels bruising and bleeding is worsening but overall it's been stabe on this     -not  pemphigous - ? Foliaceus type desmoglein 1 and 3negative    Had work up at [facility] 8/2022 (records reviewed) and hematology last week at [facility] (unable to review records)   Saw  [place] again in 10/2022   Saw Dr. Mena at [place] in 11/2022 -   Seen dermatology a few times.     She's not on warfarin that causes skin necrosis or nsaids that cause pseudoreaction in the sun   Lupus anticoagulant usually not assoactiaed with this kind of brusiing   It's not a typical look for vasculitis eitehr   She doesn't decribe bullous lesions seen with pemphigoid or TEN either -  y  Doesn't appear like pyoderma gangrenosum   Appears like nsaids related pseudoreactoin but she is off all nadis.         3. undifferentiated connective tissue disease -  - Positive JENNY 1:320 and lupus anticoagulant - no raynaud's, easy bruising in arms , hx of migraines   Elevated esr and crp-  Slightly elevated esr and crp -   Work up for lupus has been negative per rheumatology at [facility]   Hx of hcq - did not help her -   -d/w her to try methotrexate - she declined - b/c of hair loss possiblity   - stopped  leflunomide every 10mg every other  day - started in 1/2023- doing better on this til 11/2023  Now back on it since 12/2023   Asked her to Taper again to every other day due to hx of elevated lfts - -   Off methotrexaye 10mg a  weel and fa 1mg a day- too groggy with this.   Add back hcq 200mg bid - she tried it for 1 month in the past - d/w her to give it at least 2 month s- didn't help her and she went off   Discussed risks and benefits of medication with patient , including retinal toxicity risk and importance of regular monitoring on the medication.     Rtc in 4-6  months   ;abs in 4-6 months     She feels bruising is worse on this - it is also helping the pain   - cont. For now - will see if bruising improves off savella for a longer time and see if leflunomide helps with the pain and inflammation    Hematology follow b/c of bruising - no clear etiology - not releated to lupus anticoaglunat at this time.     4. Right knee osteoarthritis - s/p right knee arthroplasty - in 10/2018  Still hurts   Takes tramadol 50mg a day    Tried diclofenac and celebrex in the past.   meloxicam didn't help.   Diclofenac helped a little bit. VOLTAREN GEL 1 % topical helped a litlte bit.   Had sx at [facility] - will be disucssing with Dr. Villa Call to see if she needs another sx.       5. Left shoulder pain - better with steroid injection in 9/2021 - assc numbness and tingling.      6. Lyme disease - in 2/2020 - pos. Burgdorferi. Saw ID - treated with abx 1 month of doxycycline. She felt better after the oral abx but then felt bad again.   She was not able to return to [facility] b/c of Covid lockdown happened in 3/2020. Never got the disucssion if she needed IV abx.   Has numbness in legs til she gets motivated.   She had EMG   She feels her feet and legs started around the time she was dx with lymes.   [facility] 2/10/2022 - they did not recommend further treatment = -  Has another follow up -   [facility]  - not keen on going on iv abx     7. Neuropathy /headaches - on amitriptyline 10mg at night - increase to 20mg a day - since 25mg makes her too drowsy     8. Hx of candida esophagitis - in 12/2022 - egd - treatedw ith anti fungal      Summary:  1.  Restart   leflunomide 10mg every other day   2.Cont.  Savella 25mg twice a day in the future  - Pt. Assistance form for savella filled out every December   3.  Cont. amitrpityline 25mg at night- f/u with neurology  4. . Cont. Tramadol 50mg a day as needed.   5.. Cont. tizandine for arm pain as needed  6.  Return to clinic in 4 -6 month  7. Labs in 4-6 months.   8. Ibuprofen 800mg as needed     [email or web address]    -consider CBD oil    Shara Michelle MD  4/4/2025   12:33 PM         is applicable because the patient's medical record notes reflects the ongoing nature of the continuous longitudinal relationship of care, and the medical record indicates that there is ongoing treatment of a serious/complex  medical condition.

## 2025-04-22 ENCOUNTER — HOSPITAL ENCOUNTER (OUTPATIENT)
Dept: MRI IMAGING | Facility: HOSPITAL | Age: 65
Discharge: HOME | End: 2025-04-22
Attending: Other
Payer: MEDICARE

## 2025-04-22 DIAGNOSIS — G43.009 MIGRAINE WITHOUT AURA AND WITHOUT STATUS MIGRAINOSUS, NOT INTRACTABLE: ICD-10-CM

## 2025-04-22 DIAGNOSIS — M54.12 CERVICAL RADICULOPATHY: ICD-10-CM

## 2025-04-22 DIAGNOSIS — G47.00 INSOMNIA, UNSPECIFIED TYPE: ICD-10-CM

## 2025-04-22 PROCEDURE — 70551 MRI BRAIN STEM W/O DYE: CPT | Performed by: OTHER

## 2025-06-09 DIAGNOSIS — M19.90 UNDIFFERENTIATED INFLAMMATORY ARTHRITIS: ICD-10-CM

## 2025-06-09 RX ORDER — MILNACIPRAN HYDROCHLORIDE 25 MG/1
TABLET, FILM COATED ORAL
Qty: 180 TABLET | Refills: 0 | Status: SHIPPED | OUTPATIENT
Start: 2025-06-09

## 2025-06-09 NOTE — TELEPHONE ENCOUNTER
Patient called to request a refill for the following medication:     Medication Quantity Refills Start End   Milnacipran HCl (SAVELLA) 25 MG Oral Tab 180 tablet 0 3/5/2025 --     Pharmacy: patient states wants it to be send to the pharmacy [facility] , and provided the phone number to call in to the pharmacy : [phone].     Please call patient with any questions, and patient also wants to be notified once the medication is in.     Patient is low on the medication.

## 2025-06-09 NOTE — TELEPHONE ENCOUNTER
Please see request below.    LOV:  4/4/25 with Dr. Michelle  Future Appointments   Date Time Provider Department Center   10/7/2025 12:20 PM Radha Ortega MD [facility]     Labs:      Component      Latest Ref Rng 3/3/2025   WBC      4.0 - 11.0 x10(3) uL 8.4    RBC      3.80 - 5.30 x10(6)uL 4.57    Hemoglobin      12.0 - 16.0 g/dL 13.2    Hematocrit      35.0 - 48.0 % 41.3    MCV      80.0 - 100.0 fL 90.4    MCH      26.0 - 34.0 pg 28.9    MCHC      31.0 - 37.0 g/dL 32.0    RDW      11.0 - 15.0 % 14.6    RDW-SD      35.1 - 46.3 fL 49.1 (H)    Platelet Count      150.0 - 450.0 10(3)uL 212.0    CREATININE      0.55 - 1.02 mg/dL 0.90    EGFR      >=60 mL/min/1.73m2 71    AST (SGOT)      <34 U/L 17    C-REACTIVE PROTEIN      <1.00 mg/dL <0.40    SED RATE      0 - 30 mm/Hr 47 (H)    ALT (SGPT)      10 - 49 U/L 9 (L)       Legend:  (H) High  (L) Low

## 2025-06-10 DIAGNOSIS — M19.90 UNDIFFERENTIATED INFLAMMATORY ARTHRITIS: ICD-10-CM

## 2025-06-10 RX ORDER — MILNACIPRAN HYDROCHLORIDE 25 MG/1
TABLET, FILM COATED ORAL
Qty: 180 TABLET | Refills: 0 | Status: CANCELLED | OUTPATIENT
Start: 2025-06-10

## 2025-06-10 NOTE — TELEPHONE ENCOUNTER
Patient called to advise on below and to state she spoke with pharmacy who advised her they have not received anything.

## 2025-06-16 ENCOUNTER — TELEPHONE (OUTPATIENT)
Dept: RHEUMATOLOGY | Facility: CLINIC | Age: 65
End: 2025-06-16

## 2025-06-16 DIAGNOSIS — M19.90 UNDIFFERENTIATED INFLAMMATORY ARTHRITIS: ICD-10-CM

## 2025-06-16 RX ORDER — MILNACIPRAN HYDROCHLORIDE 25 MG/1
TABLET, FILM COATED ORAL
Qty: 180 TABLET | Refills: 0 | Status: SHIPPED | OUTPATIENT
Start: 2025-06-16

## 2025-06-16 NOTE — TELEPHONE ENCOUNTER
Patient  called in regards to medication below, said it was never received at pharmacy   Needs to be sent to pharmacy below   [facility] [place] - [address] [phone], [phone]       Medication Detail    Medication Quantity Refills Start End   Milnacipran HCl (SAVELLA) 25 MG Oral Tab 180 tablet 0 6/9/2025 --   Sig:   Take 25 mg (1 tab) by mouth in the morning and 25 mg (1 tab) before bedtime.     Route:   (none)     Order #:   [id number]

## 2025-06-16 NOTE — TELEPHONE ENCOUNTER
Prescription pharmacy switched to [facility] per patient request using already approved prescription

## 2025-07-04 NOTE — TELEPHONE ENCOUNTER
Please contact [facility] at [phone] and obtain a copy of the patient's colonoscopy report performed by Dr. Myla Ortiz within the past few years. H/H 6.9/20.1

## 2025-08-22 DIAGNOSIS — G43.009 MIGRAINE WITHOUT AURA AND WITHOUT STATUS MIGRAINOSUS, NOT INTRACTABLE: ICD-10-CM

## 2025-08-25 RX ORDER — RIZATRIPTAN BENZOATE 10 MG/1
10 TABLET ORAL DAILY
Qty: 9 TABLET | Refills: 11 | Status: SHIPPED | OUTPATIENT
Start: 2025-08-25

## (undated) DIAGNOSIS — M54.12 CERVICAL RADICULOPATHY: Primary | ICD-10-CM

## (undated) DIAGNOSIS — R20.2 PARESTHESIA: ICD-10-CM

## (undated) DEVICE — MEDI-VAC NON-CONDUCTIVE SUCTION TUBING 6MM X 1.8M (6FT.) L: Brand: CARDINAL HEALTH

## (undated) DEVICE — 3 ML SYRINGE LUER-LOCK TIP: Brand: MONOJECT

## (undated) DEVICE — Device: Brand: CUSTOM PROCEDURE KIT

## (undated) DEVICE — 35 ML SYRINGE REGULAR TIP: Brand: MONOJECT

## (undated) DEVICE — Device: Brand: DEFENDO AIR/WATER/SUCTION AND BIOPSY VALVE

## (undated) DEVICE — CONMED SCOPE SAVER BITE BLOCK, 20X27 MM: Brand: SCOPE SAVER

## (undated) DEVICE — LINE MNTR ADLT SET O2 INTMD

## (undated) DEVICE — 6 ML SYRINGE LUER-LOCK TIP: Brand: MONOJECT

## (undated) DEVICE — FORCEP RADIAL JAW 4

## (undated) NOTE — LETTER
[address] [address]   Date:   9/24/2020     Name:   Delano Arenas    YOB: 1960   MRN:   [id number]       [facility]? Jorge the areas on your body where you feel the described sensations.   Use the appropriate sy

## (undated) NOTE — LETTER
[address]   Date:   8/20/2020     Name:   Tammy Nunes    YOB: 1960   MRN:   [id number]       [facility]? Jorge the areas on your body where you feel the described sensations.   Use the appropriate sy

## (undated) NOTE — LETTER
20          Paula Standard Ryan Pel  :  1960      To Whom It May Concern: This patient was seen in our office on 20 . If this office may be of further assistance, please do not hesitate to contact us.       Sincerely,         Anam Guerrero

## (undated) NOTE — LETTER
2/1/2022              Sarahi Jones        [address]       To Dr. Kevin Dominique,     The patient states she was supposed to get further treatment for her Lymes if her oral antibiotic didn't work in 3/2020. Because of the pandemic , she was not able to be seen for further treatment. 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 She continues to have theses complaints. She also  has joint pain and can not determine if this is lymes related. She  may need IV ceftriaxone.      Sincerely,        Elsa Wilson MD  [address], [address] [place] [address] [phone]        Document electronically generated by:  Elsa Wilson MD

## (undated) NOTE — LETTER
1/9/2025        Sarahi Jones        [address] [place] [id number]          Case ID: [id number]         To Whom It May Concern,    Please send the medication Savella (milnacipran) directly to the patients home at the address listed above.     Sincerely,          Shara Michelle MD  [address] [place] [address]  Ph: [phone]  Fax: [phone]

## (undated) NOTE — LETTER
No referring provider defined for this encounter. 06/24/19        Patient: Beckie Rivera   YOB: 1960   Date of Visit: 6/24/2019       Dear  Dr. Marivel Rich,      Thank you for referring Beckie Rivera to my practice.   She presented

## (undated) NOTE — LETTER
10/20/2022              Sarahi Jones        [address] [address]         Dear Lorene Gil,    We have requested patient medicine to be delivered to patient for third time. We have not received the [facility] for this patient for 10/13. Please ship this and all her future Savella medicine to patient home. New Patient assistance form was also completed and faxed to you on 10/19/2022. We hope this does not happen again.        Sincerely,    Gerson Herndon MD  [address] [place] [address]  [phone]

## (undated) NOTE — LETTER
[facility]   Date:   12/4/2023     Name:   Stephanie Serrano    YOB: 1960   MRN:   [id number]       [facility]? Elida the areas on your body where you feel the described sensations. Use the appropriate symbol. Toni Diaz the areas of radiation. Include all affected areas. Just to complete the picture, please draw in the face. ACHE:  ^ ^ ^   NUMBNESS:  0000   PINS & NEEDLES:  = = = =                              ^ ^ ^                       0000              = = = =                                    ^ ^ ^                       0000            = = = =      BURNING:  XXXX   STABBING: ////                  XXXX                ////                         XXXX          ////     Please elida the line below indicating your degree of pain right now  with 0 being no pain 10 being the worst pain possible.                                          0             1             2              3             4              5              6              7             8             9             10         Patient Signature: